# Patient Record
Sex: FEMALE | Race: WHITE | Employment: OTHER | URBAN - METROPOLITAN AREA
[De-identification: names, ages, dates, MRNs, and addresses within clinical notes are randomized per-mention and may not be internally consistent; named-entity substitution may affect disease eponyms.]

---

## 2017-01-05 ENCOUNTER — OFFICE VISIT (OUTPATIENT)
Dept: FAMILY MEDICINE | Facility: CLINIC | Age: 62
End: 2017-01-05
Payer: COMMERCIAL

## 2017-01-05 VITALS
SYSTOLIC BLOOD PRESSURE: 124 MMHG | BODY MASS INDEX: 25.21 KG/M2 | HEIGHT: 69 IN | RESPIRATION RATE: 16 BRPM | DIASTOLIC BLOOD PRESSURE: 72 MMHG | OXYGEN SATURATION: 99 % | HEART RATE: 92 BPM | TEMPERATURE: 98.1 F | WEIGHT: 170.2 LBS

## 2017-01-05 DIAGNOSIS — S51.801A WOUND, OPEN, ARM, FOREARM, RIGHT, INITIAL ENCOUNTER: Primary | ICD-10-CM

## 2017-01-05 PROCEDURE — 99213 OFFICE O/P EST LOW 20 MIN: CPT | Performed by: PHYSICIAN ASSISTANT

## 2017-01-05 RX ORDER — MUPIROCIN 20 MG/G
OINTMENT TOPICAL 3 TIMES DAILY
Qty: 22 G | Refills: 1 | Status: SHIPPED | OUTPATIENT
Start: 2017-01-05 | End: 2017-01-10

## 2017-01-05 NOTE — NURSING NOTE
"Chief Complaint   Patient presents with     Derm Problem     Wound on left arm x2 months.  Sore and itchy.     /72 mmHg  Pulse 92  Temp(Src) 98.1  F (36.7  C) (Oral)  Resp 16  Ht 5' 8.5\" (1.74 m)  Wt 170 lb 3.2 oz (77.202 kg)  BMI 25.50 kg/m2  SpO2 99%  LMP 08/15/2010  Breastfeeding? No Estimated body mass index is 25.5 kg/(m^2) as calculated from the following:    Height as of this encounter: 5' 8.5\" (1.74 m).    Weight as of this encounter: 170 lb 3.2 oz (77.202 kg).  bp completed using cuff size: regular      left    Health Maintenance Due Pending Provider Review:  NONE    n/a    Yaneth De Paz MA  [facility]    "

## 2017-01-05 NOTE — MR AVS SNAPSHOT
"              After Visit Summary   1/5/2017    Maggi Reynolds    MRN: [id number]           Patient Information     Date Of Birth          1955        Visit Information        Provider Department      1/5/2017 12:00 PM Paco Moser PA-C [facility]        Today's Diagnoses     Wound, open, arm, forearm, right, initial encounter    -  1        Follow-ups after your visit        Who to contact     If you have questions or need follow up information about today's clinic visit or your schedule please contact [facility] directly at [phone].  Normal or non-critical lab and imaging results will be communicated to you by [employer], letter or phone within 4 business days after the clinic has received the results. If you do not hear from us within 7 days, please contact the clinic through [employer] or phone. If you have a critical or abnormal lab result, we will notify you by phone as soon as possible.  Submit refill requests through [employer] or call your pharmacy and they will forward the refill request to us. Please allow 3 business days for your refill to be completed.          Additional Information About Your Visit        MyChart Information     [employer] gives you secure access to your electronic health record. If you see a primary care provider, you can also send messages to your care team and make appointments. If you have questions, please call your primary care clinic.  If you do not have a primary care provider, please call [phone] and they will assist you.        Care EveryWhere ID     This is your Care EveryWhere ID. This could be used by other organizations to access your [place] medical records  [id number]        Your Vitals Were     Pulse Temperature Respirations    92 98.1  F (36.7  C) (Oral) 16    Height BMI (Body Mass Index) Pulse Oximetry    5' 8.5\" (1.74 m) 25.50 kg/m2 99%    Last Period Breastfeeding?       08/15/2010 No        Blood Pressure from " Last 3 Encounters:   01/05/17 124/72   05/31/16 134/62   05/27/16 136/77    Weight from Last 3 Encounters:   01/05/17 170 lb 3.2 oz (77.202 kg)   05/31/16 176 lb (79.833 kg)   05/27/16 178 lb (80.74 kg)              Today, you had the following     No orders found for display         Today's Medication Changes          These changes are accurate as of: 1/5/17 12:03 PM.  If you have any questions, ask your nurse or doctor.               Start taking these medicines.        Dose/Directions    mupirocin 2 % ointment   Commonly known as:  BACTROBAN   Used for:  Wound, open, arm, forearm, right, initial encounter   Started by:  Paco Moser PA-C        Apply topically 3 times daily for 5 days   Quantity:  22 g   Refills:  1            Where to get your medicines      These medications were sent to [facility] [address]  [address] [address]     Phone:  [phone]    - mupirocin 2 % ointment             Primary Care Provider Office Phone # Fax #    Sara Thomas [phone]       [facility] [address] [address]        Thank you!     Thank you for choosing [facility]  for your care. Our goal is always to provide you with excellent care. Hearing back from our patients is one way we can continue to improve our services. Please take a few minutes to complete the written survey that you may receive in the mail after your visit with us. Thank you!             Your Updated Medication List - Protect others around you: Learn how to safely use, store and throw away your medicines at www.disposemymeds.org.          This list is accurate as of: 1/5/17 12:03 PM.  Always use your most recent med list.                   Brand Name Dispense Instructions for use    albuterol 108 (90 BASE) MCG/ACT Inhaler    PROAIR HFA/PROVENTIL HFA/VENTOLIN HFA    1 Inhaler    Inhale 2 puffs into the lungs every  6 hours as needed for shortness of breath / dyspnea or wheezing       aspirin 325 MG tablet     qs    1 tab in am       FLUoxetine 20 MG capsule    PROZAC    90 capsule    3 CAP PO QD (Once per day) in AM.       lisinopril 20 MG tablet    PRINIVIL/ZESTRIL    30 tablet    Take 1 tablet (20 mg) by mouth daily       mupirocin 2 % ointment    BACTROBAN    22 g    Apply topically 3 times daily for 5 days       REFRESH 1 % ophthalmic solution   Generic drug:  carboxymethylcellulose     1    as needed       RITALIN 10 MG tablet   Generic drug:  methylphenidate      Take 10 mg by mouth 2 times daily. 1.5 in AM, 1.5 NOON, 20mg in PM if needed.

## 2017-01-05 NOTE — PROGRESS NOTES
"  SUBJECTIVE:                                                    Maggi Reynolds is a 61 year old female who presents to clinic today for the following health issues:      Chief Complaint   Patient presents with     Derm Problem     Wound on left arm x2 months.  Sore and itchy.             Problem list and histories reviewed & adjusted, as indicated.  Additional history: she did have a pinching injury a few months back, and has had sore over the area since then.  She did treat with some antibiotic ointment and some band aid.  It is in a bad spot, where she flexes her arm a lot, and unable to really keep band aid on.      Problem list, Medication list, Allergies, and Medical/Social/Surgical histories reviewed in EPIC and updated as appropriate.    ROS:  Constitutional, HEENT, cardiovascular, pulmonary, gi and gu systems are negative, except as otherwise noted.    OBJECTIVE:                                                    /72 mmHg  Pulse 92  Temp(Src) 98.1  F (36.7  C) (Oral)  Resp 16  Ht 5' 8.5\" (1.74 m)  Wt 170 lb 3.2 oz (77.202 kg)  BMI 25.50 kg/m2  SpO2 99%  LMP 08/15/2010  Breastfeeding? No  Body mass index is 25.5 kg/(m^2).  GENERAL: alert and no distress  EYES: Eyes grossly normal to inspection  RESP: lungs clear to auscultation - no rales, rhonchi or wheezes  CV: regular rate and rhythm, normal S1 S2, no S3 or S4, no murmur, click or rub, no peripheral edema and peripheral pulses strong  SKIN: small eschar over right medial AC joint.    Diagnostic Test Results:  none      ASSESSMENT/PLAN:                                                            1. Wound, open, arm, forearm, right, initial encounter  Does not look actively infected, most likely just has not had time to heal due to location.  Will cover with smaller band aid along with antibiotic ointment.  If no improvement in 7-10 days, contact us, may nee referral to derm.  - mupirocin (BACTROBAN) 2 % ointment; Apply topically 3 " times daily for 5 days  Dispense: 22 g; Refill: 1        Paco Moser PA-C  [facility]

## 2017-04-07 DIAGNOSIS — I10 BENIGN ESSENTIAL HYPERTENSION: ICD-10-CM

## 2017-04-07 RX ORDER — LISINOPRIL 20 MG/1
20 TABLET ORAL DAILY
Qty: 30 TABLET | Refills: 5 | Status: SHIPPED | OUTPATIENT
Start: 2017-04-07 | End: 2017-10-21

## 2017-04-07 NOTE — TELEPHONE ENCOUNTER
Lisinopril 20mg      Last Written Prescription Date: 9/30/16  Last Fill Quantity: 30, # refills: 5  Last Office Visit with [facility], [facility] or [facility] prescribing provider: 1/5/17       Potassium   Date Value Ref Range Status   05/08/2015 4.7 3.4 - 5.3 mmol/L Final     Creatinine   Date Value Ref Range Status   05/08/2015 1.05 (H) 0.52 - 1.04 mg/dL Final     BP Readings from Last 3 Encounters:   01/05/17 124/72   05/31/16 134/62   05/27/16 136/77     Routing refill request to provider for review/approval because:  Labs out of range:  Elevated SCr at last check  Labs not current:  Overdue for annual labs    Gisele Torres, PharmD  [facility]  [phone]

## 2017-04-12 DIAGNOSIS — F33.0 MILD RECURRENT MAJOR DEPRESSION (H): ICD-10-CM

## 2017-04-12 NOTE — TELEPHONE ENCOUNTER
Fluoxetine 20mg     Last Written Prescription Date: 12/3/16  Last Fill Quantity: 90, # refills: 0  Last Office Visit with [facility] primary care provider:  1/5/17        Last PHQ-9 score on record=   PHQ-9 SCORE 3/31/2016   Total Score -   Total Score 5     Routing refill request to provider for review/approval because:  Labs not current:  Due for PHQ9    Gisele Torres PharmD  [facility]

## 2017-05-26 ENCOUNTER — OFFICE VISIT (OUTPATIENT)
Dept: FAMILY MEDICINE | Facility: CLINIC | Age: 62
End: 2017-05-26
Payer: COMMERCIAL

## 2017-05-26 VITALS
HEIGHT: 69 IN | HEART RATE: 78 BPM | WEIGHT: 175.2 LBS | SYSTOLIC BLOOD PRESSURE: 116 MMHG | DIASTOLIC BLOOD PRESSURE: 68 MMHG | OXYGEN SATURATION: 98 % | BODY MASS INDEX: 25.95 KG/M2 | TEMPERATURE: 98.1 F | RESPIRATION RATE: 15 BRPM

## 2017-05-26 DIAGNOSIS — F33.0 MILD RECURRENT MAJOR DEPRESSION (H): ICD-10-CM

## 2017-05-26 DIAGNOSIS — J40 BRONCHITIS: ICD-10-CM

## 2017-05-26 DIAGNOSIS — R07.0 THROAT PAIN: Primary | ICD-10-CM

## 2017-05-26 LAB
DEPRECATED S PYO AG THROAT QL EIA: NORMAL
MICRO REPORT STATUS: NORMAL
SPECIMEN SOURCE: NORMAL

## 2017-05-26 PROCEDURE — 87081 CULTURE SCREEN ONLY: CPT | Performed by: FAMILY MEDICINE

## 2017-05-26 PROCEDURE — 99214 OFFICE O/P EST MOD 30 MIN: CPT | Performed by: FAMILY MEDICINE

## 2017-05-26 PROCEDURE — 87880 STREP A ASSAY W/OPTIC: CPT | Performed by: FAMILY MEDICINE

## 2017-05-26 RX ORDER — ALBUTEROL SULFATE 90 UG/1
2 AEROSOL, METERED RESPIRATORY (INHALATION) EVERY 6 HOURS PRN
Qty: 1 INHALER | Refills: 3 | Status: SHIPPED | OUTPATIENT
Start: 2017-05-26 | End: 2017-10-31

## 2017-05-26 RX ORDER — AZITHROMYCIN 250 MG/1
TABLET, FILM COATED ORAL
Qty: 6 TABLET | Refills: 0 | Status: SHIPPED | OUTPATIENT
Start: 2017-05-26 | End: 2017-06-09

## 2017-05-26 NOTE — TELEPHONE ENCOUNTER
Fluoxetine 20 mg capsules  Last Written Prescription Date: 4/12/17  Last Fill Quantity: 90, # refills: 0  Last Office Visit with [facility] primary care provider:  5/27/16       Last PHQ-9 score on record=   PHQ-9 SCORE 3/31/2016   Total Score -   Total Score 5

## 2017-05-26 NOTE — MR AVS SNAPSHOT
"              After Visit Summary   5/26/2017    Maggi Reynolds    MRN: [id number]           Patient Information     Date Of Birth          1955        Visit Information        Provider Department      5/26/2017 12:45 PM Sara Thomas MD [facility]        Today's Diagnoses     Throat pain    -  1    Bronchitis           Follow-ups after your visit        Who to contact     If you have questions or need follow up information about today's clinic visit or your schedule please contact [facility] directly at [phone].  Normal or non-critical lab and imaging results will be communicated to you by [employer], letter or phone within 4 business days after the clinic has received the results. If you do not hear from us within 7 days, please contact the clinic through [employer] or phone. If you have a critical or abnormal lab result, we will notify you by phone as soon as possible.  Submit refill requests through [employer] or call your pharmacy and they will forward the refill request to us. Please allow 3 business days for your refill to be completed.          Additional Information About Your Visit        MyChart Information     [employer] gives you secure access to your electronic health record. If you see a primary care provider, you can also send messages to your care team and make appointments. If you have questions, please call your primary care clinic.  If you do not have a primary care provider, please call [phone] and they will assist you.        Care EveryWhere ID     This is your Care EveryWhere ID. This could be used by other organizations to access your [place] medical records  [id number]        Your Vitals Were     Pulse Temperature Respirations Height Last Period Pulse Oximetry    78 98.1  F (36.7  C) (Oral) 15 5' 8.5\" (1.74 m) 08/15/2010 98%    Breastfeeding? BMI (Body Mass Index)                No 26.25 kg/m2           Blood Pressure from Last 3 Encounters:   05/26/17 " 116/68   01/05/17 124/72   05/31/16 134/62    Weight from Last 3 Encounters:   05/26/17 175 lb 3.2 oz (79.5 kg)   01/05/17 170 lb 3.2 oz (77.2 kg)   05/31/16 176 lb (79.8 kg)              We Performed the Following     Beta strep group A culture     Strep, Rapid Screen          Today's Medication Changes          These changes are accurate as of: 5/26/17  3:22 PM.  If you have any questions, ask your nurse or doctor.               Start taking these medicines.        Dose/Directions    azithromycin 250 MG tablet   Commonly known as:  ZITHROMAX   Used for:  Bronchitis   Started by:  Sara Thomas MD        Two tablets first day, then one tablet daily for four days.   Quantity:  6 tablet   Refills:  0            Where to get your medicines      These medications were sent to [facility] [address]  [address] [place] [address], [facility] [address]     Phone:  [phone]     albuterol 108 (90 BASE) MCG/ACT Inhaler    azithromycin 250 MG tablet                Primary Care Provider Office Phone # Fax #    Sara Thomas [phone]       [facility] [address] [facility] [address] [place] [id number]        Thank you!     Thank you for choosing [facility]  for your care. Our goal is always to provide you with excellent care. Hearing back from our patients is one way we can continue to improve our services. Please take a few minutes to complete the written survey that you may receive in the mail after your visit with us. Thank you!             Your Updated Medication List - Protect others around you: Learn how to safely use, store and throw away your medicines at www.disposemymeds.org.          This list is accurate as of: 5/26/17  3:22 PM.  Always use your most recent med list.                   Brand Name Dispense Instructions for use    albuterol 108 (90 BASE) MCG/ACT Inhaler    PROAIR HFA/PROVENTIL HFA/VENTOLIN HFA    1 Inhaler    Inhale 2 puffs  into the lungs every 6 hours as needed for shortness of breath / dyspnea or wheezing       aspirin 325 MG tablet     qs    1 tab in am       azithromycin 250 MG tablet    ZITHROMAX    6 tablet    Two tablets first day, then one tablet daily for four days.       FLUoxetine 20 MG capsule    PROZAC    90 capsule    3 CAP PO QD (Once per day) in AM.       lisinopril 20 MG tablet    PRINIVIL/ZESTRIL    30 tablet    Take 1 tablet (20 mg) by mouth daily       REFRESH 1 % ophthalmic solution   Generic drug:  carboxymethylcellulose     1    as needed       RITALIN 10 MG tablet   Generic drug:  methylphenidate      Take 10 mg by mouth 2 times daily. 1.5 in AM, 1.5 NOON, 20mg in PM if needed.

## 2017-05-26 NOTE — PROGRESS NOTES
SUBJECTIVE:                                                    Maggi Reynolds is a 62 year old female who presents to clinic today for the following health issues:      RESPIRATORY SYMPTOMS      Duration: 3-4 days    Description  sore throat, facial pain/pressure and cough    Severity: moderate - severe (hasn't been able to go to work)    Accompanying signs and symptoms: None    History (predisposing factors):  none    Precipitating or alleviating factors: None    Therapies tried and outcome:  NyQuil and Aleve- only getting some relief.           Problem list and histories reviewed & adjusted, as indicated.  Additional history: as documented    Patient Active Problem List   Diagnosis     Disorder of bone and cartilage     Disease of lung     Mild recurrent major depression (H)     CARDIOVASCULAR SCREENING; LDL GOAL LESS THAN 160     Hypertension goal BP (blood pressure) < 140/90     Advanced directives, counseling/discussion     Discharge from left nipple     Cervical lymphadenopathy     Past Surgical History:   Procedure Laterality Date     DILATION AND CURETTAGE  8/21/2013    Procedure: DILATION AND CURETTAGE;;  Surgeon: Tamar Walsh MD;  Location: [facility]     LAPAROSCOPIC SALPINGO-OOPHORECTOMY  8/21/2013    Procedure: LAPAROSCOPIC SALPINGO-OOPHORECTOMY;  Laparoscopic Bilateral Salpingo-Oophorectomy, Pelvic washings, Dilation and Curettage;  Surgeon: Tamar Walsh MD;  Location:  OR     LUMPECTOMY BREAST Bilateral 5/31/2016    Procedure: LUMPECTOMY BREAST;  Surgeon: Barney Givens MD;  Location:  OR       Social History   Substance Use Topics     Smoking status: Never Smoker     Smokeless tobacco: Never Used     Alcohol use 0.0 oz/week     0 Standard drinks or equivalent per week      Comment: wine sometimes     Family History   Problem Relation Age of Onset     C.A.D. Father      bypass at age 78 and Pace maker at age 90     DIABETES Father      type 2     Breast Cancer  Maternal Grandmother      at age 40's     Arthritis Mother      OSTEOPOROSIS Mother      C.A.D. Paternal Grandmother      congestive heart failure     HEART DISEASE Father      HEART DISEASE Mother      arhythmia     CEREBROVASCULAR DISEASE Maternal Grandfather      at age 70's     Allergies Mother      seasonal     Alzheimer Disease Mother      at age 81     GASTROINTESTINAL DISEASE Father      ulcers     OSTEOPOROSIS Mother      at age 75     Respiratory Brother      bronchiestis         Current Outpatient Prescriptions   Medication Sig Dispense Refill     albuterol (PROAIR HFA/PROVENTIL HFA/VENTOLIN HFA) 108 (90 BASE) MCG/ACT Inhaler Inhale 2 puffs into the lungs every 6 hours as needed for shortness of breath / dyspnea or wheezing 1 Inhaler 3     azithromycin (ZITHROMAX) 250 MG tablet Two tablets first day, then one tablet daily for four days. 6 tablet 0     FLUoxetine (PROZAC) 20 MG capsule 3 CAP PO QD (Once per day) in AM. 90 capsule 0     lisinopril (PRINIVIL/ZESTRIL) 20 MG tablet Take 1 tablet (20 mg) by mouth daily 30 tablet 5     methylphenidate (RITALIN) 10 MG tablet Take 10 mg by mouth 2 times daily. 1.5 in AM, 1.5 NOON, 20mg in PM if needed.       ASPIRIN 325 MG OR TABS 1 tab in am qs prn     REFRESH 1 % OP SOLN as needed 1 0     [DISCONTINUED] albuterol (PROAIR HFA, PROVENTIL HFA, VENTOLIN HFA) 108 (90 BASE) MCG/ACT inhaler Inhale 2 puffs into the lungs every 6 hours as needed for shortness of breath / dyspnea or wheezing 1 Inhaler 3     Allergies   Allergen Reactions     No Known Allergies      Recent Labs   Lab Test  03/31/16   1152  05/08/15   1141  03/22/14   0206   11/01/10   1111  10/01/10   0759   07/01/09   1159   LDL  128*   --    --    --    --   68   --   83   HDL  135   --    --    --    --   108   --   99   TRIG  59   --    --    --    --   60   --   51   ALT   --   30   --    --    --    --    --    --    CR   --   1.05*  0.90   < >   --   1.01   < >  0.93   GFRESTIMATED   --   53*  64    "< >   --   57*   < >  63   GFRESTBLACK   --   64  78   < >   --   69   < >  76   POTASSIUM   --   4.7  4.5   < >   --   5.0   < >  4.0   TSH  1.79   --    --    --   2.35   --    < >   --     < > = values in this interval not displayed.      BP Readings from Last 3 Encounters:   05/26/17 116/68   01/05/17 124/72   05/31/16 134/62    Wt Readings from Last 3 Encounters:   05/26/17 175 lb 3.2 oz (79.5 kg)   01/05/17 170 lb 3.2 oz (77.2 kg)   05/31/16 176 lb (79.8 kg)                  Labs reviewed in EPIC    Reviewed and updated as needed this visit by clinical staff       Reviewed and updated as needed this visit by Provider         ROS:  Constitutional, HEENT, cardiovascular, pulmonary, GI, , musculoskeletal, neuro, skin, endocrine and psych systems are negative, except as otherwise noted.    OBJECTIVE:                                                    /68  Pulse 78  Temp 98.1  F (36.7  C) (Oral)  Resp 15  Ht 5' 8.5\" (1.74 m)  Wt 175 lb 3.2 oz (79.5 kg)  LMP 08/15/2010  SpO2 98%  Breastfeeding? No  BMI 26.25 kg/m2  Body mass index is 26.25 kg/(m^2).  GENERAL: healthy, alert and no distress  EYES: Eyes grossly normal to inspection, PERRL and conjunctivae and sclerae normal  HENT: ear canals and TM's normal, nose and mouth without ulcers or lesions  NECK: no adenopathy, no asymmetry, masses, or scars and thyroid normal to palpation  RESP: lungs clear to auscultation - no rales, rhonchi or wheezes  CV: regular rate and rhythm, normal S1 S2, no S3 or S4, no murmur, click or rub, no peripheral edema and peripheral pulses strong  ABDOMEN: soft, nontender, no hepatosplenomegaly, no masses and bowel sounds normal  MS: no gross musculoskeletal defects noted, no edema    Diagnostic Test Results:  none      ASSESSMENT/PLAN:                                                      1. Bronchitis  We discussed round of Abx and also Albuterol to see if that helps with her cough , she does not have asthma but has had " bronchitis in the past and this has helped .  - albuterol (PROAIR HFA/PROVENTIL HFA/VENTOLIN HFA) 108 (90 BASE) MCG/ACT Inhaler; Inhale 2 puffs into the lungs every 6 hours as needed for shortness of breath / dyspnea or wheezing  Dispense: 1 Inhaler; Refill: 3  - azithromycin (ZITHROMAX) 250 MG tablet; Two tablets first day, then one tablet daily for four days.  Dispense: 6 tablet; Refill: 0    2. Throat pain  Negative , discussed with the pt , treatment plan as above   - Strep, Rapid Screen  - Beta strep group A culture    RTC if no improving or worsening.  Pt is aware  and comfortable with the current plan.      Sara Thomas MD  [facility]

## 2017-05-26 NOTE — TELEPHONE ENCOUNTER
ANUEL  Pt is due for apt for this but I am not able to sign local print (won't e-prescribe).  I added in comments due for apt.  Please authorize if appropriate.  Thanks,  Valencia Lindsay RN

## 2017-05-27 LAB
BACTERIA SPEC CULT: NORMAL
MICRO REPORT STATUS: NORMAL
SPECIMEN SOURCE: NORMAL

## 2017-05-30 ENCOUNTER — TELEPHONE (OUTPATIENT)
Dept: FAMILY MEDICINE | Facility: CLINIC | Age: 62
End: 2017-05-30

## 2017-05-30 DIAGNOSIS — J40 BRONCHITIS: Primary | ICD-10-CM

## 2017-05-30 RX ORDER — CODEINE PHOSPHATE AND GUAIFENESIN 10; 100 MG/5ML; MG/5ML
1-2 SOLUTION ORAL AT BEDTIME
Qty: 420 ML | Refills: 0 | Status: SHIPPED | OUTPATIENT
Start: 2017-05-30 | End: 2017-06-09

## 2017-05-30 NOTE — TELEPHONE ENCOUNTER
LS  Pt requesting cough medicine for sleep   Cough worsens at night, once it starts has difficulty stopping  Finished last dose of abx this morning, last temp 99F  Has used Robitussin AC in the past with good results, pended, Please approve if appropriate  Lorena Perdue RN

## 2017-05-30 NOTE — TELEPHONE ENCOUNTER
Reason for call:  Patient reporting a symptom    Symptom or request: still coughing during the night.  Pt was just seen on Friday and was given a Z-jyotsna and it  Has not helped . Was told to call and get more medication of  Something different.    Duration (how long have symptoms been present): on going    Have you been treated for this before? Yes    Additional comments:       Phone Number patient can be reached at:      Home Phone [phone]   Mobile [phone]     Call the cell phone    Best Time:  any    Can we leave a detailed message on this number:  YES    Call taken on 5/30/2017 at 9:05 AM by Capri Hull

## 2017-06-05 NOTE — TELEPHONE ENCOUNTER
Patient called back, reports cough syrup makes her feel kind of lousy, is taking it at night is not feeling any, sore throat is coming back, was told by Dr Thomas to call in if not feeling better, patient does not feel any better wondering if she needs another round of antibiotics [facility] call her Cell [phone] ok leave message

## 2017-06-05 NOTE — TELEPHONE ENCOUNTER
LS,  Please see below message and advise on next steps  Spoke with pt to inform her you are back in office tomorrow  Jacinta CONTRERAS RN

## 2017-06-06 RX ORDER — AZITHROMYCIN 250 MG/1
TABLET, FILM COATED ORAL
Qty: 6 TABLET | Refills: 0 | Status: SHIPPED | OUTPATIENT
Start: 2017-06-06 | End: 2017-06-09

## 2017-06-06 NOTE — TELEPHONE ENCOUNTER
i have sent another Rx for an Abx but if she is not better by Fri , I would need to see her , can you let her know ?  Thanks

## 2017-06-09 ENCOUNTER — OFFICE VISIT (OUTPATIENT)
Dept: FAMILY MEDICINE | Facility: CLINIC | Age: 62
End: 2017-06-09
Payer: COMMERCIAL

## 2017-06-09 VITALS
HEART RATE: 103 BPM | TEMPERATURE: 97.7 F | DIASTOLIC BLOOD PRESSURE: 66 MMHG | HEIGHT: 69 IN | OXYGEN SATURATION: 97 % | BODY MASS INDEX: 25.25 KG/M2 | SYSTOLIC BLOOD PRESSURE: 110 MMHG | WEIGHT: 170.5 LBS

## 2017-06-09 DIAGNOSIS — R05.9 COUGH: Primary | ICD-10-CM

## 2017-06-09 DIAGNOSIS — F33.0 MILD RECURRENT MAJOR DEPRESSION (H): ICD-10-CM

## 2017-06-09 DIAGNOSIS — J98.01 BRONCHOSPASM: ICD-10-CM

## 2017-06-09 PROCEDURE — 99213 OFFICE O/P EST LOW 20 MIN: CPT | Performed by: PHYSICIAN ASSISTANT

## 2017-06-09 RX ORDER — BENZONATATE 200 MG/1
200 CAPSULE ORAL 3 TIMES DAILY PRN
Qty: 21 CAPSULE | Refills: 0 | Status: SHIPPED | OUTPATIENT
Start: 2017-06-09 | End: 2017-10-31

## 2017-06-09 RX ORDER — PREDNISONE 20 MG/1
20 TABLET ORAL DAILY
Qty: 5 TABLET | Refills: 0 | Status: SHIPPED | OUTPATIENT
Start: 2017-06-09 | End: 2017-10-31

## 2017-06-09 ASSESSMENT — ANXIETY QUESTIONNAIRES
2. NOT BEING ABLE TO STOP OR CONTROL WORRYING: NOT AT ALL
6. BECOMING EASILY ANNOYED OR IRRITABLE: SEVERAL DAYS
3. WORRYING TOO MUCH ABOUT DIFFERENT THINGS: NOT AT ALL
GAD7 TOTAL SCORE: 1
7. FEELING AFRAID AS IF SOMETHING AWFUL MIGHT HAPPEN: NOT AT ALL
1. FEELING NERVOUS, ANXIOUS, OR ON EDGE: NOT AT ALL
5. BEING SO RESTLESS THAT IT IS HARD TO SIT STILL: NOT AT ALL
IF YOU CHECKED OFF ANY PROBLEMS ON THIS QUESTIONNAIRE, HOW DIFFICULT HAVE THESE PROBLEMS MADE IT FOR YOU TO DO YOUR WORK, TAKE CARE OF THINGS AT HOME, OR GET ALONG WITH OTHER PEOPLE: NOT DIFFICULT AT ALL

## 2017-06-09 ASSESSMENT — PATIENT HEALTH QUESTIONNAIRE - PHQ9: 5. POOR APPETITE OR OVEREATING: NOT AT ALL

## 2017-06-09 NOTE — PATIENT INSTRUCTIONS
Please call and schedule your breast cancer screening mammogram.  This is the most common form of cancer in women and the risk increases significantly after age 50 to 74. We recommend  BIYEARLY mammograms as well as a yearly breast exam by a physician. You do not need an order for this to be scheduled.    [facility] [phone]  [facility] [phone]    If you have had a mammogram outside of [place] within one to two year please contact us so that we may request those records.    Take good care,    [employer]

## 2017-06-09 NOTE — Clinical Note
Please abstract the following data from this visit with this patient into the appropriate field in Epic:  Colonoscopy done on this date: July 2016 (approximately), by this group: [facility], results were normal.

## 2017-06-09 NOTE — MR AVS SNAPSHOT
After Visit Summary   6/9/2017    Maggi Reynolds    MRN: [id number]           Patient Information     Date Of Birth          1955        Visit Information        Provider Department      6/9/2017 3:40 PM Paco Moser PA-C [facility]        Today's Diagnoses     Cough    -  1    Bronchospasm          Care Instructions    Please call and schedule your breast cancer screening mammogram.  This is the most common form of cancer in women and the risk increases significantly after age 50 to 74. We recommend  BIYEARLY mammograms as well as a yearly breast exam by a physician. You do not need an order for this to be scheduled.    [facility] [phone]  [facility] [phone]    If you have had a mammogram outside of [place] within one to two year please contact us so that we may request those records.    Take good care,    [facility] TEAM            Follow-ups after your visit        Who to contact     If you have questions or need follow up information about today's clinic visit or your schedule please contact [facility] directly at [phone].  Normal or non-critical lab and imaging results will be communicated to you by MyChart, letter or phone within 4 business days after the clinic has received the results. If you do not hear from us within 7 days, please contact the clinic through [employer] or phone. If you have a critical or abnormal lab result, we will notify you by phone as soon as possible.  Submit refill requests through [employer] or call your pharmacy and they will forward the refill request to us. Please allow 3 business days for your refill to be completed.          Additional Information About Your Visit        MyChart Information     [employer] gives you secure access to your electronic health record. If you see a primary care provider, you can also send messages to your care team and make appointments. If you have questions,  "please call your primary care clinic.  If you do not have a primary care provider, please call [phone] and they will assist you.        Care EveryWhere ID     This is your Care EveryWhere ID. This could be used by other organizations to access your [facility] medical records  [id number]        Your Vitals Were     Pulse Temperature Height Last Period Pulse Oximetry Breastfeeding?    103 97.7  F (36.5  C) (Tympanic) 5' 8.5\" (1.74 m) 08/15/2010 97% No    BMI (Body Mass Index)                   25.55 kg/m2            Blood Pressure from Last 3 Encounters:   06/09/17 110/66   05/26/17 116/68   01/05/17 124/72    Weight from Last 3 Encounters:   06/09/17 170 lb 8 oz (77.3 kg)   05/26/17 175 lb 3.2 oz (79.5 kg)   01/05/17 170 lb 3.2 oz (77.2 kg)              Today, you had the following     No orders found for display         Today's Medication Changes          These changes are accurate as of: 6/9/17  3:49 PM.  If you have any questions, ask your nurse or doctor.               Start taking these medicines.        Dose/Directions    benzonatate 200 MG capsule   Commonly known as:  TESSALON   Used for:  Cough   Started by:  Paco Moser PA-C        Dose:  200 mg   Take 1 capsule (200 mg) by mouth 3 times daily as needed for cough   Quantity:  21 capsule   Refills:  0       predniSONE 20 MG tablet   Commonly known as:  DELTASONE   Used for:  Bronchospasm   Started by:  Paco Moser PA-C        Dose:  20 mg   Take 1 tablet (20 mg) by mouth daily   Quantity:  5 tablet   Refills:  0            Where to get your medicines      These medications were sent to [facility] [place], [place] - [address]  [address] [address]     Phone:  [phone]     benzonatate 200 MG capsule    predniSONE 20 MG tablet                Primary Care Provider Office Phone # Fax #    Sara Thomas [phone]       [address] "   [facility] [address]        Thank you!     Thank you for choosing [facility]  for your care. Our goal is always to provide you with excellent care. Hearing back from our patients is one way we can continue to improve our services. Please take a few minutes to complete the written survey that you may receive in the mail after your visit with us. Thank you!             Your Updated Medication List - Protect others around you: Learn how to safely use, store and throw away your medicines at www.disposemymeds.org.          This list is accurate as of: 6/9/17  3:49 PM.  Always use your most recent med list.                   Brand Name Dispense Instructions for use    albuterol 108 (90 BASE) MCG/ACT Inhaler    PROAIR HFA/PROVENTIL HFA/VENTOLIN HFA    1 Inhaler    Inhale 2 puffs into the lungs every 6 hours as needed for shortness of breath / dyspnea or wheezing       aspirin 325 MG tablet     qs    1 tab in am       benzonatate 200 MG capsule    TESSALON    21 capsule    Take 1 capsule (200 mg) by mouth 3 times daily as needed for cough       FLUoxetine 20 MG capsule    PROZAC    90 capsule    3 CAP PO QD (Once per day) in AM.       lisinopril 20 MG tablet    PRINIVIL/ZESTRIL    30 tablet    Take 1 tablet (20 mg) by mouth daily       predniSONE 20 MG tablet    DELTASONE    5 tablet    Take 1 tablet (20 mg) by mouth daily       REFRESH 1 % ophthalmic solution   Generic drug:  carboxymethylcellulose     1    as needed       RITALIN 10 MG tablet   Generic drug:  methylphenidate      Take 10 mg by mouth 2 times daily. 1.5 in AM, 1.5 NOON, 20mg in PM if needed.

## 2017-06-10 ASSESSMENT — ANXIETY QUESTIONNAIRES: GAD7 TOTAL SCORE: 1

## 2017-06-10 ASSESSMENT — PATIENT HEALTH QUESTIONNAIRE - PHQ9: SUM OF ALL RESPONSES TO PHQ QUESTIONS 1-9: 2

## 2017-06-30 ENCOUNTER — HEALTH MAINTENANCE LETTER (OUTPATIENT)
Age: 62
End: 2017-06-30

## 2017-07-10 DIAGNOSIS — F33.0 MILD RECURRENT MAJOR DEPRESSION (H): ICD-10-CM

## 2017-07-10 NOTE — TELEPHONE ENCOUNTER
Prozac 20mg     Last Written Prescription Date: 5/26/17  Last Fill Quantity: 90, # refills: 0  Last Office Visit with [facility] primary care provider:  5/26/17        Last PHQ-9 score on record=   PHQ-9 SCORE 6/9/2017   Total Score -   Total Score 2         Thank you!  Bhumika Mckinney  [facility]

## 2017-07-10 NOTE — TELEPHONE ENCOUNTER
Prescription approved per [facility].      Thanks,    Siomara Rossi, Pharm. D.  [facility]  [facility]  [phone]

## 2017-10-21 DIAGNOSIS — I10 BENIGN ESSENTIAL HYPERTENSION: ICD-10-CM

## 2017-10-23 NOTE — TELEPHONE ENCOUNTER
Left non detailed VM for pt asking that they callback and schedule (physical with labs)  Jacinta CONTRERAS RN

## 2017-10-27 RX ORDER — LISINOPRIL 20 MG/1
20 TABLET ORAL DAILY
Qty: 30 TABLET | Refills: 0 | Status: SHIPPED | OUTPATIENT
Start: 2017-10-27 | End: 2019-09-04

## 2017-10-27 NOTE — TELEPHONE ENCOUNTER
Medication is being filled for 1 time refill only due to:  upcoming appt    Next 5 appointments (look out 90 days)     Oct 31, 2017 10:00 AM CDT   Office Visit with Sara Thomas MD   [facility] ([facility])    [address]  [facility] [address]   [phone]                Jacinta CONTRERAS RN

## 2017-10-31 ENCOUNTER — OFFICE VISIT (OUTPATIENT)
Dept: FAMILY MEDICINE | Facility: CLINIC | Age: 62
End: 2017-10-31
Payer: COMMERCIAL

## 2017-10-31 VITALS
TEMPERATURE: 96.8 F | WEIGHT: 172 LBS | HEART RATE: 74 BPM | HEIGHT: 69 IN | OXYGEN SATURATION: 98 % | BODY MASS INDEX: 25.48 KG/M2 | RESPIRATION RATE: 16 BRPM | SYSTOLIC BLOOD PRESSURE: 108 MMHG | DIASTOLIC BLOOD PRESSURE: 65 MMHG

## 2017-10-31 DIAGNOSIS — J40 BRONCHITIS: ICD-10-CM

## 2017-10-31 DIAGNOSIS — Z00.00 ENCOUNTER FOR ROUTINE ADULT HEALTH EXAMINATION WITHOUT ABNORMAL FINDINGS: Primary | ICD-10-CM

## 2017-10-31 DIAGNOSIS — Z83.3 FAMILY HISTORY OF DIABETES MELLITUS: ICD-10-CM

## 2017-10-31 DIAGNOSIS — F33.0 MILD RECURRENT MAJOR DEPRESSION (H): ICD-10-CM

## 2017-10-31 DIAGNOSIS — I10 BENIGN ESSENTIAL HYPERTENSION: ICD-10-CM

## 2017-10-31 DIAGNOSIS — R21 RASH: ICD-10-CM

## 2017-10-31 LAB
ALBUMIN SERPL-MCNC: 3.6 G/DL (ref 3.4–5)
ALP SERPL-CCNC: 118 U/L (ref 40–150)
ALT SERPL W P-5'-P-CCNC: 47 U/L (ref 0–50)
ANION GAP SERPL CALCULATED.3IONS-SCNC: 9 MMOL/L (ref 3–14)
AST SERPL W P-5'-P-CCNC: 26 U/L (ref 0–45)
BILIRUB SERPL-MCNC: 0.5 MG/DL (ref 0.2–1.3)
BUN SERPL-MCNC: 23 MG/DL (ref 7–30)
CALCIUM SERPL-MCNC: 9.3 MG/DL (ref 8.5–10.1)
CHLORIDE SERPL-SCNC: 106 MMOL/L (ref 94–109)
CHOLEST SERPL-MCNC: 207 MG/DL
CO2 SERPL-SCNC: 25 MMOL/L (ref 20–32)
CREAT SERPL-MCNC: 1.25 MG/DL (ref 0.52–1.04)
GFR SERPL CREATININE-BSD FRML MDRD: 43 ML/MIN/1.7M2
GLUCOSE SERPL-MCNC: 91 MG/DL (ref 70–99)
HBA1C MFR BLD: 5.2 % (ref 4.3–6)
HDLC SERPL-MCNC: 111 MG/DL
LDLC SERPL CALC-MCNC: 85 MG/DL
NONHDLC SERPL-MCNC: 96 MG/DL
POTASSIUM SERPL-SCNC: 4.6 MMOL/L (ref 3.4–5.3)
PROT SERPL-MCNC: 7.5 G/DL (ref 6.8–8.8)
SODIUM SERPL-SCNC: 140 MMOL/L (ref 133–144)
TRIGL SERPL-MCNC: 56 MG/DL

## 2017-10-31 PROCEDURE — 99213 OFFICE O/P EST LOW 20 MIN: CPT | Mod: 25 | Performed by: FAMILY MEDICINE

## 2017-10-31 PROCEDURE — 36415 COLL VENOUS BLD VENIPUNCTURE: CPT | Performed by: FAMILY MEDICINE

## 2017-10-31 PROCEDURE — 80061 LIPID PANEL: CPT | Performed by: FAMILY MEDICINE

## 2017-10-31 PROCEDURE — 83036 HEMOGLOBIN GLYCOSYLATED A1C: CPT | Performed by: FAMILY MEDICINE

## 2017-10-31 PROCEDURE — 90471 IMMUNIZATION ADMIN: CPT | Performed by: FAMILY MEDICINE

## 2017-10-31 PROCEDURE — 99396 PREV VISIT EST AGE 40-64: CPT | Mod: 25 | Performed by: FAMILY MEDICINE

## 2017-10-31 PROCEDURE — 90715 TDAP VACCINE 7 YRS/> IM: CPT | Performed by: FAMILY MEDICINE

## 2017-10-31 PROCEDURE — 80053 COMPREHEN METABOLIC PANEL: CPT | Performed by: FAMILY MEDICINE

## 2017-10-31 RX ORDER — LISINOPRIL 10 MG/1
10 TABLET ORAL DAILY
Qty: 90 TABLET | Refills: 1 | Status: SHIPPED | OUTPATIENT
Start: 2017-10-31 | End: 2018-06-04

## 2017-10-31 RX ORDER — LISINOPRIL 20 MG/1
20 TABLET ORAL DAILY
Qty: 30 TABLET | Refills: 0 | Status: CANCELLED | OUTPATIENT
Start: 2017-10-31

## 2017-10-31 RX ORDER — ALBUTEROL SULFATE 90 UG/1
2 AEROSOL, METERED RESPIRATORY (INHALATION) EVERY 6 HOURS PRN
Qty: 1 INHALER | Refills: 3 | Status: SHIPPED | OUTPATIENT
Start: 2017-10-31 | End: 2019-02-08

## 2017-10-31 NOTE — PATIENT INSTRUCTIONS

## 2017-10-31 NOTE — MR AVS SNAPSHOT
After Visit Summary   10/31/2017    Maggi Reynolds    MRN: [id number]           Patient Information     Date Of Birth          1955        Visit Information        Provider Department      10/31/2017 10:00 AM Sara Thomas MD [facility]        Today's Diagnoses     Encounter for routine adult health examination without abnormal findings    -  1    Bronchitis        Benign essential hypertension        Mild recurrent major depression (H)        Family history of diabetes mellitus        Rash          Care Instructions      Preventive Health Recommendations  Female Ages 50 - 64    Yearly exam: See your health care provider every year in order to  o Review health changes.   o Discuss preventive care.    o Review your medicines if your doctor has prescribed any.      Get a Pap test every three years (unless you have an abnormal result and your provider advises testing more often).    If you get Pap tests with HPV test, you only need to test every 5 years, unless you have an abnormal result.     You do not need a Pap test if your uterus was removed (hysterectomy) and you have not had cancer.    You should be tested each year for STDs (sexually transmitted diseases) if you're at risk.     Have a mammogram every 1 to 2 years.    Have a colonoscopy at age 50, or have a yearly FIT test (stool test). These exams screen for colon cancer.      Have a cholesterol test every 5 years, or more often if advised.    Have a diabetes test (fasting glucose) every three years. If you are at risk for diabetes, you should have this test more often.     If you are at risk for osteoporosis (brittle bone disease), think about having a bone density scan (DEXA).    Shots: Get a flu shot each year. Get a tetanus shot every 10 years.    Nutrition:     Eat at least 5 servings of fruits and vegetables each day.    Eat whole-grain bread, whole-wheat pasta and brown rice instead of white grains and  rice.    Talk to your provider about Calcium and Vitamin D.     Lifestyle    Exercise at least 150 minutes a week (30 minutes a day, 5 days a week). This will help you control your weight and prevent disease.    Limit alcohol to one drink per day.    No smoking.     Wear sunscreen to prevent skin cancer.     See your dentist every six months for an exam and cleaning.    See your eye doctor every 1 to 2 years.  PLEASE CALL TO SCHEDULE YOUR MAMMOGRAM  [facility] [phone]  [facility] [phone]                    Follow-ups after your visit        Additional Services     DERMATOLOGY REFERRAL       Your provider has referred you to: [facility]: [facility] [phone]  [email or web address]    Please be aware that coverage of these services is subject to the terms and limitations of your health insurance plan.  Call member services at your health plan with any benefit or coverage questions.      Please bring the following with you to your appointment:    (1) Any X-Rays, CTs or MRIs which have been performed.  Contact the facility where they were done to arrange for  prior to your scheduled appointment.    (2) List of current medications  (3) This referral request   (4) Any documents/labs given to you for this referral                  Who to contact     If you have questions or need follow up information about today's clinic visit or your schedule please contact [facility] directly at [phone].  Normal or non-critical lab and imaging results will be communicated to you by MyChart, letter or phone within 4 business days after the clinic has received the results. If you do not hear from us within 7 days, please contact the clinic through MyChart or phone. If you have a critical or abnormal lab result, we will notify you by phone as soon as possible.  Submit refill requests through [employer] or call your pharmacy and  "they will forward the refill request to us. Please allow 3 business days for your refill to be completed.          Additional Information About Your Visit        [employer] Information     [employer] gives you secure access to your electronic health record. If you see a primary care provider, you can also send messages to your care team and make appointments. If you have questions, please call your primary care clinic.  If you do not have a primary care provider, please call [phone] and they will assist you.        Care EveryWhere ID     This is your Care EveryWhere ID. This could be used by other organizations to access your [place] medical records  [id number]        Your Vitals Were     Pulse Temperature Respirations Height Last Period Pulse Oximetry    74 96.8  F (36  C) (Oral) 16 5' 8.5\" (1.74 m) 08/15/2010 98%    BMI (Body Mass Index)                   25.77 kg/m2            Blood Pressure from Last 3 Encounters:   10/31/17 108/65   06/09/17 110/66   05/26/17 116/68    Weight from Last 3 Encounters:   10/31/17 172 lb (78 kg)   06/09/17 170 lb 8 oz (77.3 kg)   05/26/17 175 lb 3.2 oz (79.5 kg)              We Performed the Following     Comprehensive metabolic panel (BMP + Alb, Alk Phos, ALT, AST, Total. Bili, TP)     DERMATOLOGY REFERRAL     Hemoglobin A1c     Lipid Profile (Chol, Trig, HDL, LDL calc)     TDAP VACCINE (ADACEL)          Today's Medication Changes          These changes are accurate as of: 10/31/17 10:22 AM.  If you have any questions, ask your nurse or doctor.               These medicines have changed or have updated prescriptions.        Dose/Directions    * lisinopril 20 MG tablet   Commonly known as:  PRINIVIL/ZESTRIL   This may have changed:  Another medication with the same name was added. Make sure you understand how and when to take each.   Used for:  Benign essential hypertension   Changed by:  Sara Thomas MD        Dose:  20 mg   Take 1 tablet (20 mg) by mouth daily   Quantity:  30 " tablet   Refills:  0       * lisinopril 10 MG tablet   Commonly known as:  PRINIVIL/ZESTRIL   This may have changed:  You were already taking a medication with the same name, and this prescription was added. Make sure you understand how and when to take each.   Used for:  Benign essential hypertension   Changed by:  Sara Thomas MD        Dose:  10 mg   Take 1 tablet (10 mg) by mouth daily   Quantity:  90 tablet   Refills:  1       * Notice:  This list has 2 medication(s) that are the same as other medications prescribed for you. Read the directions carefully, and ask your doctor or other care provider to review them with you.      Stop taking these medicines if you haven't already. Please contact your care team if you have questions.     benzonatate 200 MG capsule   Commonly known as:  TESSALON   Stopped by:  Sara Thomas MD           predniSONE 20 MG tablet   Commonly known as:  DELTASONE   Stopped by:  Sara Thomas MD                Where to get your medicines      These medications were sent to [facility] [address]  [address] [address]     Phone:  [phone]     albuterol 108 (90 BASE) MCG/ACT Inhaler    FLUoxetine 20 MG capsule    lisinopril 10 MG tablet                Primary Care Provider Office Phone # Fax #    Sara Thomas [phone]       [address] [facility] [address] [place] [address]        Equal Access to Services     [facility]: Hadii aad ku hadasho Soomaali, waaxda luqadaha, qaybta kaalmada adeegyada, jacey chicas hayavani nuñez . So [facility] [phone].    ATENCIÓN: Si habla español, tiene a duran disposición servicios gratuitos de asistencia lingüística. Llame al [phone].    We comply with applicable federal civil rights laws and [place] laws. We do not discriminate on the basis of race, color, national origin, age, disability, sex, sexual orientation, or gender identity.            Thank  you!     Thank you for choosing [facility]  for your care. Our goal is always to provide you with excellent care. Hearing back from our patients is one way we can continue to improve our services. Please take a few minutes to complete the written survey that you may receive in the mail after your visit with us. Thank you!             Your Updated Medication List - Protect others around you: Learn how to safely use, store and throw away your medicines at www.disposemymeds.org.          This list is accurate as of: 10/31/17 10:22 AM.  Always use your most recent med list.                   Brand Name Dispense Instructions for use Diagnosis    albuterol 108 (90 BASE) MCG/ACT Inhaler    PROAIR HFA/PROVENTIL HFA/VENTOLIN HFA    1 Inhaler    Inhale 2 puffs into the lungs every 6 hours as needed for shortness of breath / dyspnea or wheezing    Bronchitis       aspirin 325 MG tablet     qs    1 tab in am    Essential hypertension, benign       FLUoxetine 20 MG capsule    PROZAC    90 capsule    3 CAP PO QD (Once per day) in AM.    Mild recurrent major depression (H)       * lisinopril 20 MG tablet    PRINIVIL/ZESTRIL    30 tablet    Take 1 tablet (20 mg) by mouth daily    Benign essential hypertension       * lisinopril 10 MG tablet    PRINIVIL/ZESTRIL    90 tablet    Take 1 tablet (10 mg) by mouth daily    Benign essential hypertension       REFRESH 1 % ophthalmic solution   Generic drug:  carboxymethylcellulose     1    as needed        RITALIN 10 MG tablet   Generic drug:  methylphenidate      Take 10 mg by mouth 2 times daily. 1.5 in AM, 1.5 NOON, 20mg in PM if needed.        * Notice:  This list has 2 medication(s) that are the same as other medications prescribed for you. Read the directions carefully, and ask your doctor or other care provider to review them with you.

## 2017-10-31 NOTE — PROGRESS NOTES
SUBJECTIVE:   CC: Maggi Reynolds is an 62 year old woman who presents for preventive health visit.     Healthy Habits:    Do you get at least three servings of calcium containing foods daily (dairy, green leafy vegetables, etc.)? yes    Amount of exercise or daily activities, outside of work: 2-3 day(s) per week    Problems taking medications regularly No    Medication side effects: No    Have you had an eye exam in the past two years? yes    Do you see a dentist twice per year? yes    Do you have sleep apnea, excessive snoring or daytime drowsiness?no    1) FH of Dm type 2 and she wants to check her glucose levels   No symptoms   2) her BP has been on the lower side and she is wondering if she can decrease the dose of the lisinopril , she is currently on 20mg , has been on ti for a couple of yrs now , FH of HTN as well    3) depression, she continues to take 60 mg of Prozac and tnat seems to keep her depression under control, she denies any side effects snf she needs refills on it today .          Today's PHQ-2 Score: PHQ-2 ( 1999 Pf[employer]) 10/31/2017 6/9/2017   Q1: Little interest or pleasure in doing things 0 0   Q2: Feeling down, depressed or hopeless 0 0   PHQ-2 Score 0 0         Abuse: Current or Past(Physical, Sexual or Emotional)- No  Do you feel safe in your environment - Yes  Social History   Substance Use Topics     Smoking status: Never Smoker     Smokeless tobacco: Never Used     Alcohol use 0.0 oz/week     0 Standard drinks or equivalent per week      Comment: wine sometimes     The patient does not drink >3 drinks per day nor >7 drinks per week.    Reviewed orders with patient.  Reviewed health maintenance and updated orders accordingly - Yes  Labs reviewed in EPIC  BP Readings from Last 3 Encounters:   10/31/17 108/65   06/09/17 110/66   05/26/17 116/68    Wt Readings from Last 3 Encounters:   10/31/17 172 lb (78 kg)   06/09/17 170 lb 8 oz (77.3 kg)   05/26/17 175 lb 3.2 oz (79.5 kg)                   Patient Active Problem List   Diagnosis     Disorder of bone and cartilage     Disease of lung     Mild recurrent major depression (H)     CARDIOVASCULAR SCREENING; LDL GOAL LESS THAN 160     Hypertension goal BP (blood pressure) < 140/90     Advanced directives, counseling/discussion     Discharge from left nipple     Cervical lymphadenopathy     Past Surgical History:   Procedure Laterality Date     DILATION AND CURETTAGE  8/21/2013    Procedure: DILATION AND CURETTAGE;;  Surgeon: Tamar Walsh MD;  Location: [facility] OR     LAPAROSCOPIC SALPINGO-OOPHORECTOMY  8/21/2013    Procedure: LAPAROSCOPIC SALPINGO-OOPHORECTOMY;  Laparoscopic Bilateral Salpingo-Oophorectomy, Pelvic washings, Dilation and Curettage;  Surgeon: Tamar Walsh MD;  Location: [facility] OR     LUMPECTOMY BREAST Bilateral 5/31/2016    Procedure: LUMPECTOMY BREAST;  Surgeon: Barney Givens MD;  Location:  OR       Social History   Substance Use Topics     Smoking status: Never Smoker     Smokeless tobacco: Never Used     Alcohol use 0.0 oz/week     0 Standard drinks or equivalent per week      Comment: wine sometimes     Family History   Problem Relation Age of Onset     Arthritis Mother      OSTEOPOROSIS Mother      at age 75     HEART DISEASE Mother      arhythmia     Allergies Mother      seasonal     Alzheimer Disease Mother      at age 81     C.A.D. Father      bypass at age 78 and Pace maker at age 90     DIABETES Father      type 2     HEART DISEASE Father      GASTROINTESTINAL DISEASE Father      ulcers     Breast Cancer Maternal Grandmother      at age 40's     CEREBROVASCULAR DISEASE Maternal Grandfather      at age 70's     C.A.D. Paternal Grandmother      congestive heart failure     Respiratory Brother      bronchiestis         Current Outpatient Prescriptions   Medication Sig Dispense Refill     albuterol (PROAIR HFA/PROVENTIL HFA/VENTOLIN HFA) 108 (90 BASE) MCG/ACT Inhaler Inhale 2 puffs into the lungs every 6  hours as needed for shortness of breath / dyspnea or wheezing 1 Inhaler 3     FLUoxetine (PROZAC) 20 MG capsule 3 CAP PO QD (Once per day) in AM. 90 capsule 4     lisinopril (PRINIVIL/ZESTRIL) 10 MG tablet Take 1 tablet (10 mg) by mouth daily 90 tablet 1     lisinopril (PRINIVIL/ZESTRIL) 20 MG tablet Take 1 tablet (20 mg) by mouth daily 30 tablet 0     methylphenidate (RITALIN) 10 MG tablet Take 10 mg by mouth 2 times daily. 1.5 in AM, 1.5 NOON, 20mg in PM if needed.       ASPIRIN 325 MG OR TABS 1 tab in am qs prn     REFRESH 1 % OP SOLN as needed 1 0     [DISCONTINUED] FLUoxetine (PROZAC) 20 MG capsule 3 CAP PO QD (Once per day) in AM. 90 capsule 4     [DISCONTINUED] albuterol (PROAIR HFA/PROVENTIL HFA/VENTOLIN HFA) 108 (90 BASE) MCG/ACT Inhaler Inhale 2 puffs into the lungs every 6 hours as needed for shortness of breath / dyspnea or wheezing 1 Inhaler 3     Allergies   Allergen Reactions     No Known Allergies      Recent Labs   Lab Test  10/31/17   1025  03/31/16   1152  05/08/15   1141   11/01/10   1111  10/01/10   0759   A1C  5.2   --    --    --    --    --    LDL  85  128*   --    --    --   68   HDL  111  135   --    --    --   108   TRIG  56  59   --    --    --   60   ALT  47   --   30   --    --    --    CR  1.25*   --   1.05*   < >   --   1.01   GFRESTIMATED  43*   --   53*   < >   --   57*   GFRESTBLACK  52*   --   64   < >   --   69   POTASSIUM  4.6   --   4.7   < >   --   5.0   TSH   --   1.79   --    --   2.35   --     < > = values in this interval not displayed.              Patient over age 50, mutual decision to screen reflected in health maintenance.      Pertinent mammograms are reviewed under the imaging tab.  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     Other specified viral warts      Ovarian cyst      Unspecified essential hypertension      Unspecified symptom associated with female genital organs      Urinary tract infection, site not specified       Past Surgical History:   Procedure Laterality Date     DILATION AND CURETTAGE  8/21/2013    Procedure: DILATION AND CURETTAGE;;  Surgeon: Tamar Walsh MD;  Location: [facility] OR     LAPAROSCOPIC SALPINGO-OOPHORECTOMY  8/21/2013    Procedure: LAPAROSCOPIC SALPINGO-OOPHORECTOMY;  Laparoscopic Bilateral Salpingo-Oophorectomy, Pelvic washings, Dilation and Curettage;  Surgeon: Tamar Walsh MD;  Location: [facility] OR     LUMPECTOMY BREAST Bilateral 5/31/2016    Procedure: LUMPECTOMY BREAST;  Surgeon: Barney Givens MD;  Location:  OR       ROS:  C: NEGATIVE for fever, chills, change in weight  I: NEGATIVE for worrisome rashes, moles or lesions  E: NEGATIVE for vision changes or irritation  ENT: NEGATIVE for ear, mouth and throat problems  R: NEGATIVE for significant cough or SOB  B: NEGATIVE for masses, tenderness or discharge  CV: NEGATIVE for chest pain, palpitations or peripheral edema  GI: NEGATIVE for nausea, abdominal pain, heartburn, or change in bowel habits  : NEGATIVE for unusual urinary or vaginal symptoms. No vaginal bleeding.  M: NEGATIVE for significant arthralgias or myalgia  N: NEGATIVE for weakness, dizziness or paresthesias  P: NEGATIVE for changes in mood or affect     OBJECTIVE:   /65  Pulse 74  Temp 96.8  F (36  C) (Oral)  Resp 16  Ht 5' 8.5\" (1.74 m)  Wt 172 lb (78 kg)  LMP 08/15/2010  SpO2 98%  BMI 25.77 kg/m2  EXAM:  GENERAL: healthy, alert and no distress  EYES: Eyes grossly normal " to inspection, PERRL and conjunctivae and sclerae normal  HENT: ear canals and TM's normal, nose and mouth without ulcers or lesions  NECK: no adenopathy, no asymmetry, masses, or scars and thyroid normal to palpation  RESP: lungs clear to auscultation - no rales, rhonchi or wheezes  BREAST: normal without masses, tenderness or nipple discharge and no palpable axillary masses or adenopathy  CV: regular rate and rhythm, normal S1 S2, no S3 or S4, no murmur, click or rub, no peripheral edema and peripheral pulses strong  ABDOMEN: soft, nontender, no hepatosplenomegaly, no masses and bowel sounds normal  MS: no gross musculoskeletal defects noted, no edema  SKIN: no suspicious lesions EXCEPT there are two rashes , one is on the right upper chest about 1 cm in size oval and slight change in colors, the other is on the right buttocks and is about 2.5 c in size and some erythema in the center , no raised borders.  NEURO: Normal strength and tone, mentation intact and speech normal  PSYCH: mentation appears normal, affect normal/bright    ASSESSMENT/PLAN:   1. Encounter for routine adult health examination without abnormal findings  Discussed diet,calcium,exercise.Went over self breast exam.Thin prep was NOT 2 done.Eyes and teeth UTD.No immunizations needed today.See orders below for tests ordered and screening needed.    - TDAP VACCINE (ADACEL)    2. Bronchitis  Refilled, she only uses as needed.  - albuterol (PROAIR HFA/PROVENTIL HFA/VENTOLIN HFA) 108 (90 BASE) MCG/ACT Inhaler; Inhale 2 puffs into the lungs every 6 hours as needed for shortness of breath / dyspnea or wheezing  Dispense: 1 Inhaler; Refill: 3    3. Benign essential hypertension  We discussed cutting the dose in half , so she will be on 10mg of lisinopril and then should monitor her BP at home, recheck here in a month sooner if any concerns.  - lisinopril (PRINIVIL/ZESTRIL) 10 MG tablet; Take 1 tablet (10 mg) by mouth daily  Dispense: 90 tablet; Refill:  "1  - Lipid Profile (Chol, Trig, HDL, LDL calc)  - Comprehensive metabolic panel (BMP + Alb, Alk Phos, ALT, AST, Total. Bili, TP)    4. Mild recurrent major depression (H)  Will conntinue on the 60 mg of Prozac  - FLUoxetine (PROZAC) 20 MG capsule; 3 CAP PO QD (Once per day) in AM.  Dispense: 90 capsule; Refill: 4    5. Family history of diabetes mellitus  micha check HGB A 1 c   - Hemoglobin A1c    6. Rash  Has had several new moles/rash on the right buttocks and on mono the right shoulder   - DERMATOLOGY REFERRAL    COUNSELING:   Reviewed preventive health counseling, as reflected in patient instructions       Regular exercise       Healthy diet/nutrition       Vision screening       Hearing screening       Osteoporosis Prevention/Bone Health         reports that she has never smoked. She has never used smokeless tobacco.    Estimated body mass index is 25.77 kg/(m^2) as calculated from the following:    Height as of this encounter: 5' 8.5\" (1.74 m).    Weight as of this encounter: 172 lb (78 kg).         Counseling Resources:  ATP IV Guidelines  Pooled Cohorts Equation Calculator  Breast Cancer Risk Calculator  FRAX Risk Assessment  ICSI Preventive Guidelines  Dietary Guidelines for Americans, 2010  USDA's MyPlate  ASA Prophylaxis  Lung CA Screening    Sara Thomas MD  [facility]  "

## 2017-11-09 ENCOUNTER — TRANSFERRED RECORDS (OUTPATIENT)
Dept: HEALTH INFORMATION MANAGEMENT | Facility: CLINIC | Age: 62
End: 2017-11-09

## 2017-11-28 ENCOUNTER — TRANSFERRED RECORDS (OUTPATIENT)
Dept: HEALTH INFORMATION MANAGEMENT | Facility: CLINIC | Age: 62
End: 2017-11-28

## 2018-03-06 ENCOUNTER — OFFICE VISIT (OUTPATIENT)
Dept: FAMILY MEDICINE | Facility: CLINIC | Age: 63
End: 2018-03-06
Payer: COMMERCIAL

## 2018-03-06 VITALS
HEIGHT: 69 IN | BODY MASS INDEX: 25.62 KG/M2 | OXYGEN SATURATION: 97 % | HEART RATE: 80 BPM | TEMPERATURE: 98.1 F | DIASTOLIC BLOOD PRESSURE: 71 MMHG | RESPIRATION RATE: 16 BRPM | SYSTOLIC BLOOD PRESSURE: 119 MMHG | WEIGHT: 173 LBS

## 2018-03-06 DIAGNOSIS — Z12.31 VISIT FOR SCREENING MAMMOGRAM: Primary | ICD-10-CM

## 2018-03-06 DIAGNOSIS — R22.1 LUMP IN NECK: ICD-10-CM

## 2018-03-06 DIAGNOSIS — H66.90 ACUTE OTITIS MEDIA, UNSPECIFIED OTITIS MEDIA TYPE: ICD-10-CM

## 2018-03-06 PROCEDURE — 99214 OFFICE O/P EST MOD 30 MIN: CPT | Performed by: FAMILY MEDICINE

## 2018-03-06 RX ORDER — AZITHROMYCIN 250 MG/1
TABLET, FILM COATED ORAL
Qty: 6 TABLET | Refills: 0 | Status: SHIPPED | OUTPATIENT
Start: 2018-03-06 | End: 2019-01-16

## 2018-03-06 NOTE — PROGRESS NOTES
SUBJECTIVE:   Maggi Reynolds is a 62 year old female who presents to clinic today for the following health issues:      Concern - lump   Onset: 3 week     Description:   Lump on right side of neck discomfort eating or swallowing     Intensity: moderate    Progression of Symptoms:  constant    Accompanying Signs & Symptoms:  Right ear pain and discomfort     Previous history of similar problem:   no    Precipitating factors:   Worsened by: nothing     Alleviating factors:  Improved by: nothing     Therapies Tried and outcome: none         Problem list and histories reviewed & adjusted, as indicated.  Additional history: as documented    Patient Active Problem List   Diagnosis     Disorder of bone and cartilage     Disease of lung     Mild recurrent major depression (H)     CARDIOVASCULAR SCREENING; LDL GOAL LESS THAN 160     Hypertension goal BP (blood pressure) < 140/90     Advanced directives, counseling/discussion     Discharge from left nipple     Cervical lymphadenopathy     Past Surgical History:   Procedure Laterality Date     DILATION AND CURETTAGE  8/21/2013    Procedure: DILATION AND CURETTAGE;;  Surgeon: Tamar Walsh MD;  [facility]     LAPAROSCOPIC SALPINGO-OOPHORECTOMY  8/21/2013    Procedure: LAPAROSCOPIC SALPINGO-OOPHORECTOMY;  Laparoscopic Bilateral Salpingo-Oophorectomy, Pelvic washings, Dilation and Curettage;  Surgeon: Tamar Walsh MD;  Location:  OR     LUMPECTOMY BREAST Bilateral 5/31/2016    Procedure: LUMPECTOMY BREAST;  Surgeon: Barney Givens MD;  Location:  OR       Social History   Substance Use Topics     Smoking status: Never Smoker     Smokeless tobacco: Never Used     Alcohol use 0.0 oz/week     0 Standard drinks or equivalent per week      Comment: wine sometimes     Family History   Problem Relation Age of Onset     Arthritis Mother      OSTEOPOROSIS Mother      at age 75     HEART DISEASE Mother      arhythmia     Allergies Mother       seasonal     Alzheimer Disease Mother      at age 81     C.A.D. Father      bypass at age 78 and Pace maker at age 90     DIABETES Father      type 2     HEART DISEASE Father      GASTROINTESTINAL DISEASE Father      ulcers     Breast Cancer Maternal Grandmother      at age 40's     CEREBROVASCULAR DISEASE Maternal Grandfather      at age 70's     C.A.D. Paternal Grandmother      congestive heart failure     Respiratory Brother      bronchiestis         Current Outpatient Prescriptions   Medication Sig Dispense Refill     azithromycin (ZITHROMAX) 250 MG tablet Two tablets first day, then one tablet daily for four days. 6 tablet 0     albuterol (PROAIR HFA/PROVENTIL HFA/VENTOLIN HFA) 108 (90 BASE) MCG/ACT Inhaler Inhale 2 puffs into the lungs every 6 hours as needed for shortness of breath / dyspnea or wheezing 1 Inhaler 3     FLUoxetine (PROZAC) 20 MG capsule 3 CAP PO QD (Once per day) in AM. 90 capsule 4     lisinopril (PRINIVIL/ZESTRIL) 10 MG tablet Take 1 tablet (10 mg) by mouth daily 90 tablet 1     lisinopril (PRINIVIL/ZESTRIL) 20 MG tablet Take 1 tablet (20 mg) by mouth daily 30 tablet 0     methylphenidate (RITALIN) 10 MG tablet Take 10 mg by mouth 2 times daily. 1.5 in AM, 1.5 NOON, 20mg in PM if needed.       ASPIRIN 325 MG OR TABS 1 tab in am qs prn     REFRESH 1 % OP SOLN as needed 1 0     Allergies   Allergen Reactions     No Known Allergies      Recent Labs   Lab Test  10/31/17   1025  03/31/16   1152  05/08/15   1141   11/01/10   1111  10/01/10   0759   A1C  5.2   --    --    --    --    --    LDL  85  128*   --    --    --   68   HDL  111  135   --    --    --   108   TRIG  56  59   --    --    --   60   ALT  47   --   30   --    --    --    CR  1.25*   --   1.05*   < >   --   1.01   GFRESTIMATED  43*   --   53*   < >   --   57*   GFRESTBLACK  52*   --   64   < >   --   69   POTASSIUM  4.6   --   4.7   < >   --   5.0   TSH   --   1.79   --    --   2.35   --     < > = values in this interval not  "displayed.      BP Readings from Last 3 Encounters:   03/06/18 119/71   10/31/17 108/65   06/09/17 110/66    Wt Readings from Last 3 Encounters:   03/06/18 173 lb (78.5 kg)   10/31/17 172 lb (78 kg)   06/09/17 170 lb 8 oz (77.3 kg)                  Labs reviewed in EPIC    Reviewed and updated as needed this visit by clinical staff       Reviewed and updated as needed this visit by Provider         ROS:  Constitutional, HEENT, cardiovascular, pulmonary, GI, , musculoskeletal, neuro, skin, endocrine and psych systems are negative, except as otherwise noted.    OBJECTIVE:     /71  Pulse 80  Temp 98.1  F (36.7  C) (Oral)  Resp 16  Ht 5' 8.5\" (1.74 m)  Wt 173 lb (78.5 kg)  LMP 08/15/2010  SpO2 97%  BMI 25.92 kg/m2  Body mass index is 25.92 kg/(m^2).  GENERAL: healthy, alert and no distress  HENT: ear canals and TM's normal, nose and mouth without ulcers or lesions  NECK: cervical adenopathy anterior  cervical right side of the neck , no asymmetry, masses, or scars and thyroid normal to palpation EXCEPT an enlarged lymph node on the right anterior cervical nodes area and is about 1.5 cm in size , soft , non erythematous, non tender to the touch   RESP: lungs clear to auscultation - no rales, rhonchi or wheezes  CV: regular rate and rhythm, normal S1 S2, no S3 or S4, no murmur, click or rub, no peripheral edema and peripheral pulses strong  MS: no gross musculoskeletal defects noted, no edema  NEURO: Normal strength and tone, mentation intact and speech normal    Diagnostic Test Results:  none     ASSESSMENT/PLAN:       1. Visit for screening mammogram  She is due for a mammogram and I have given her a referral for that , she will make the appointment   - MA SCREENING DIGITAL BILAT - Future  (s+30); Future    2. Lump in neck  Discussed since she has had the lump for several months now , will attempt treating with Abx but if this does not go away in 2 to 3 weeks should schedule an appointment with ENT and I " have given her the referral.  - US Head Neck Soft Tissue; Future  - OTOLARYNGOLOGY REFERRAL    3. Acute otitis media, unspecified otitis media type  Will treat with Abx   - azithromycin (ZITHROMAX) 250 MG tablet; Two tablets first day, then one tablet daily for four days.  Dispense: 6 tablet; Refill: 0    RTC if no improving or worsening.  Pt is aware  and comfortable with the current plan.      Sara Thomas MD  [facility]

## 2018-03-06 NOTE — PATIENT INSTRUCTIONS
PLEASE CALL TO SCHEDULE YOUR MAMMOGRAM  [facility] [phone]  [facility] [phone]

## 2018-03-06 NOTE — NURSING NOTE
"Chief Complaint   Patient presents with     Mass     /71  Pulse 80  Temp 98.1  F (36.7  C) (Oral)  Resp 16  Ht 5' 8.5\" (1.74 m)  Wt 173 lb (78.5 kg)  LMP 08/15/2010  SpO2 97%  BMI 25.92 kg/m2 Estimated body mass index is 25.92 kg/(m^2) as calculated from the following:    Height as of this encounter: 5' 8.5\" (1.74 m).    Weight as of this encounter: 173 lb (78.5 kg).  bp completed using cuff size: regular       Health Maintenance addressed:  Mammogram and PHQ9    Possibly completing today per provider review.    Karley Sandoval MA     "

## 2018-03-06 NOTE — MR AVS SNAPSHOT
After Visit Summary   3/6/2018    Maggi Reynolds    MRN: [id number]           Patient Information     Date Of Birth          1955        Visit Information        Provider Department      3/6/2018 11:00 AM Sara Thomas MD [facility]        Today's Diagnoses     Visit for screening mammogram    -  1    Lump in neck        Acute otitis media, unspecified otitis media type          Care Instructions    PLEASE CALL TO SCHEDULE YOUR MAMMOGRAM  [facility] [phone]  [facility] [phone]              Follow-ups after your visit        Additional Services     OTOLARYNGOLOGY REFERRAL       Your provider has referred you to: [facility]: [facility] (Otolaryngology) - [place] [phone]  [email or web address]    Please be aware that coverage of these services is subject to the terms and limitations of your health insurance plan.  Call member services at your health plan with any benefit or coverage questions.      Please bring the following with you to your appointment:    (1) Any X-Rays, CTs or MRIs which have been performed.  Contact the facility where they were done to arrange for  prior to your scheduled appointment.   (2) List of current medications  (3) This referral request   (4) Any documents/labs given to you for this referral                  Future tests that were ordered for you today     Open Future Orders        Priority Expected Expires Ordered    US Head Neck Soft Tissue Routine  3/6/2019 3/6/2018    MA SCREENING DIGITAL BILAT - Future  (s+30) Routine  3/6/2019 3/6/2018            Who to contact     If you have questions or need follow up information about today's clinic visit or your schedule please contact [facility] directly at [phone].  Normal or non-critical lab and imaging results will be communicated to you by Anna  "letter or phone within 4 business days after the clinic has received the results. If you do not hear from us within 7 days, please contact the clinic through twidox or phone. If you have a critical or abnormal lab result, we will notify you by phone as soon as possible.  Submit refill requests through twidox or call your pharmacy and they will forward the refill request to us. Please allow 3 business days for your refill to be completed.          Additional Information About Your Visit        [facility] Information     twidox gives you secure access to your electronic health record. If you see a primary care provider, you can also send messages to your care team and make appointments. If you have questions, please call your primary care clinic.  If you do not have a primary care provider, please call [phone] and they will assist you.        Care EveryWhere ID     This is your Care EveryWhere ID. This could be used by other organizations to access your [facility] medical records  [id number]        Your Vitals Were     Pulse Temperature Respirations Height Last Period Pulse Oximetry    80 98.1  F (36.7  C) (Oral) 16 5' 8.5\" (1.74 m) 08/15/2010 97%    BMI (Body Mass Index)                   25.92 kg/m2            Blood Pressure from Last 3 Encounters:   03/06/18 119/71   10/31/17 108/65   06/09/17 110/66    Weight from Last 3 Encounters:   03/06/18 173 lb (78.5 kg)   10/31/17 172 lb (78 kg)   06/09/17 170 lb 8 oz (77.3 kg)              We Performed the Following     OTOLARYNGOLOGY REFERRAL          Today's Medication Changes          These changes are accurate as of 3/6/18 11:23 AM.  If you have any questions, ask your nurse or doctor.               Start taking these medicines.        Dose/Directions    azithromycin 250 MG tablet   Commonly known as:  ZITHROMAX   Used for:  Acute otitis media, unspecified otitis media type   Started by:  Sara Thomas MD        Two tablets first day, then one tablet daily for " four days.   Quantity:  6 tablet   Refills:  0         Stop taking these medicines if you haven't already. Please contact your care team if you have questions.     benzonatate 200 MG capsule   Commonly known as:  TESSALON   Stopped by:  Sara Thomas MD                Where to get your medicines      These medications were sent to [facility] [place], [place] - [address]  [address], [facility] [address]     Phone:  [phone]     azithromycin 250 MG tablet                Primary Care Provider Office Phone # Fax #    Sara Thomas [phone]       [address] [facility]   [place] [address]        Equal Access to Services     [facility] : Hadii giovana fitzgerald hadasho Sojamesali, waaxda luqadaha, qaybta kaalmada adeegyada, jacey thomasin audie nuñez . So [facility] [phone].    ATENCIÓN: Si habla español, tiene a duran disposición servicios gratuitos de asistencia lingüística. Llame al [phone].    We comply with applicable federal civil rights laws and [place] laws. We do not discriminate on the basis of race, color, national origin, age, disability, sex, sexual orientation, or gender identity.            Thank you!     Thank you for choosing [facility]  for your care. Our goal is always to provide you with excellent care. Hearing back from our patients is one way we can continue to improve our services. Please take a few minutes to complete the written survey that you may receive in the mail after your visit with us. Thank you!             Your Updated Medication List - Protect others around you: Learn how to safely use, store and throw away your medicines at www.disposemymeds.org.          This list is accurate as of 3/6/18 11:23 AM.  Always use your most recent med list.                   Brand Name Dispense Instructions for use Diagnosis    albuterol 108 (90 BASE) MCG/ACT Inhaler    PROAIR HFA/PROVENTIL HFA/VENTOLIN HFA    1 Inhaler     Inhale 2 puffs into the lungs every 6 hours as needed for shortness of breath / dyspnea or wheezing    Bronchitis       aspirin 325 MG tablet     qs    1 tab in am    Essential hypertension, benign       azithromycin 250 MG tablet    ZITHROMAX    6 tablet    Two tablets first day, then one tablet daily for four days.    Acute otitis media, unspecified otitis media type       FLUoxetine 20 MG capsule    PROZAC    90 capsule    3 CAP PO QD (Once per day) in AM.    Mild recurrent major depression (H)       * lisinopril 20 MG tablet    PRINIVIL/ZESTRIL    30 tablet    Take 1 tablet (20 mg) by mouth daily    Benign essential hypertension       * lisinopril 10 MG tablet    PRINIVIL/ZESTRIL    90 tablet    Take 1 tablet (10 mg) by mouth daily    Benign essential hypertension       REFRESH 1 % ophthalmic solution   Generic drug:  carboxymethylcellulose     1    as needed        RITALIN 10 MG tablet   Generic drug:  methylphenidate      Take 10 mg by mouth 2 times daily. 1.5 in AM, 1.5 NOON, 20mg in PM if needed.        * Notice:  This list has 2 medication(s) that are the same as other medications prescribed for you. Read the directions carefully, and ask your doctor or other care provider to review them with you.

## 2018-03-07 ASSESSMENT — PATIENT HEALTH QUESTIONNAIRE - PHQ9: SUM OF ALL RESPONSES TO PHQ QUESTIONS 1-9: 1

## 2018-04-10 NOTE — TELEPHONE ENCOUNTER
FUTURE VISIT INFORMATION      FUTURE VISIT INFORMATION:    Date: 05/03/2018    Time: 3:30    Location: [facility]  REFERRAL INFORMATION:    Referring provider:  DR. HAWLEY    Referring providers clinic:   FAMILY PRACTICE    Reason for visit/diagnosis  RIGHT NECK LUMP        RECORDS STATUS    ALL RECORDS IN EPIC OFFICE VISIT: 03/06/2018, 10/31/2017, 05/26/2017, 05/27/2016, 03/31/2016 & 05/08/2015    IMAGES: US NECK SOFT TISSUE 05/04/2016  IMAGES IN PACS

## 2018-04-11 ENCOUNTER — RADIANT APPOINTMENT (OUTPATIENT)
Dept: ULTRASOUND IMAGING | Facility: CLINIC | Age: 63
End: 2018-04-11
Attending: FAMILY MEDICINE
Payer: COMMERCIAL

## 2018-04-11 ENCOUNTER — RADIANT APPOINTMENT (OUTPATIENT)
Dept: MAMMOGRAPHY | Facility: CLINIC | Age: 63
End: 2018-04-11
Payer: COMMERCIAL

## 2018-04-11 DIAGNOSIS — R22.1 LUMP IN NECK: ICD-10-CM

## 2018-04-11 DIAGNOSIS — Z12.31 VISIT FOR SCREENING MAMMOGRAM: ICD-10-CM

## 2018-05-03 ENCOUNTER — PRE VISIT (OUTPATIENT)
Dept: OTOLARYNGOLOGY | Facility: CLINIC | Age: 63
End: 2018-05-03

## 2018-06-04 DIAGNOSIS — F33.0 MILD RECURRENT MAJOR DEPRESSION (H): ICD-10-CM

## 2018-06-04 DIAGNOSIS — I10 BENIGN ESSENTIAL HYPERTENSION: ICD-10-CM

## 2018-06-04 NOTE — TELEPHONE ENCOUNTER
"Requested Prescriptions   Pending Prescriptions Disp Refills     lisinopril (PRINIVIL/ZESTRIL) 10 MG tablet 90 tablet 1     Sig: Take 1 tablet (10 mg) by mouth daily    ACE Inhibitors (Including Combos) Protocol Failed    6/4/2018 10:14 AM       Failed - Normal serum creatinine on file in past 12 months    Recent Labs   Lab Test  10/31/17   1025   CR  1.25*            Passed - Blood pressure under 140/90 in past 12 months    BP Readings from Last 3 Encounters:   03/06/18 119/71   10/31/17 108/65   06/09/17 110/66                Passed - Recent (12 mo) or future (30 days) visit within the authorizing provider's specialty    Patient had office visit in the last 12 months or has a visit in the next 30 days with authorizing provider or within the authorizing provider's specialty.  See \"Patient Info\" tab in inbasket, or \"Choose Columns\" in Meds & Orders section of the refill encounter.           Passed - Patient is age 18 or older       Passed - No active pregnancy on record       Passed - Normal serum potassium on file in past 12 months    Recent Labs   Lab Test  10/31/17   1025   POTASSIUM  4.6            Passed - No positive pregnancy test in past 12 months        FLUoxetine (PROZAC) 20 MG capsule 90 capsule 4     Sig: 3 CAP PO QD (Once per day) in AM.    SSRIs Protocol Passed    6/4/2018 10:14 AM       Passed - PHQ-9 score less than 5 in past 6 months    Please review last PHQ-9 score.          Passed - Patient is age 18 or older       Passed - No active pregnancy on record       Passed - No positive pregnancy test in last 12 months       Passed - Recent (6 mo) or future (30 days) visit within the authorizing provider's specialty    Patient had office visit in the last 6 months or has a visit in the next 30 days with authorizing provider or within the authorizing provider's specialty.  See \"Patient Info\" tab in inbasket, or \"Choose Columns\" in Meds & Orders section of the refill encounter.            "

## 2018-06-04 NOTE — TELEPHONE ENCOUNTER
Prozac:  Prescription approved per [facility] Refill Protocol.    Lisinopril:  Routing refill request to provider for review/approval because:  Labs out of range:  Creatinine    Jacinta CONTRERAS RN

## 2018-06-05 RX ORDER — LISINOPRIL 10 MG/1
10 TABLET ORAL DAILY
Qty: 90 TABLET | Refills: 1 | Status: SHIPPED | OUTPATIENT
Start: 2018-06-05 | End: 2018-12-21

## 2018-07-10 ENCOUNTER — TELEPHONE (OUTPATIENT)
Dept: FAMILY MEDICINE | Facility: CLINIC | Age: 63
End: 2018-07-10

## 2018-07-10 NOTE — TELEPHONE ENCOUNTER
Not on current medication list.  Last Rx 6/9/17 for cough.  Per LS needs to be seen.  VM left informing patient.  Carolyn Peña RN

## 2018-09-07 DIAGNOSIS — F33.0 MILD RECURRENT MAJOR DEPRESSION (H): ICD-10-CM

## 2018-09-07 NOTE — TELEPHONE ENCOUNTER
"Requested Prescriptions   Pending Prescriptions Disp Refills     FLUoxetine (PROZAC) 20 MG capsule  Last Written Prescription Date:  6/4/18  Last Fill Quantity: 270 CAPSULE,  # refills: 0   Last office visit: 3/6/2018 with prescribing provider:  MARLEEN   Future Office Visit:     270 capsule 0     Sig: 3 CAP PO QD (Once per day) in AM.    SSRIs Protocol Failed    9/7/2018  1:38 PM       Failed - PHQ-9 score less than 5 in past 6 months    Please review last PHQ-9 score.   PHQ-9 SCORE 3/31/2016 6/9/2017 3/6/2018   Total Score - - -   Total Score 5 2 1     KRYSTLE-7 SCORE 6/9/2017   Total Score 1                Failed - Recent (6 mo) or future (30 days) visit within the authorizing provider's specialty    Patient had office visit in the last 6 months or has a visit in the next 30 days with authorizing provider or within the authorizing provider's specialty.  See \"Patient Info\" tab in inbasket, or \"Choose Columns\" in Meds & Orders section of the refill encounter.           Passed - Patient is age 18 or older       Passed - No active pregnancy on record       Passed - No positive pregnancy test in last 12 months          "

## 2018-09-10 NOTE — TELEPHONE ENCOUNTER
Medication is being filled for 1 time refill only due to:  being due for physical end of Oct 2018   Jacinta CONTRERAS RN

## 2018-12-20 DIAGNOSIS — I10 BENIGN ESSENTIAL HYPERTENSION: ICD-10-CM

## 2018-12-21 RX ORDER — LISINOPRIL 10 MG/1
10 TABLET ORAL DAILY
Qty: 30 TABLET | Refills: 0 | Status: SHIPPED | OUTPATIENT
Start: 2018-12-21 | End: 2019-02-05

## 2018-12-21 NOTE — TELEPHONE ENCOUNTER
"Medication is being filled for 1 time refill only due to:  Patient needs to be seen because due for physical and labs.   Jacinta CONTRERAS RN    Requested Prescriptions   Pending Prescriptions Disp Refills     lisinopril (PRINIVIL/ZESTRIL) 10 MG tablet 90 tablet 1     Sig: Take 1 tablet (10 mg) by mouth daily    ACE Inhibitors (Including Combos) Protocol Failed - 12/20/2018 11:53 AM       Failed - Normal serum creatinine on file in past 12 months    Recent Labs   Lab Test 10/31/17  1025   CR 1.25*            Failed - Normal serum potassium on file in past 12 months    Recent Labs   Lab Test 10/31/17  1025   POTASSIUM 4.6            Passed - Blood pressure under 140/90 in past 12 months    BP Readings from Last 3 Encounters:   03/06/18 119/71   10/31/17 108/65   06/09/17 110/66                Passed - Recent (12 mo) or future (30 days) visit within the authorizing provider's specialty    Patient had office visit in the last 12 months or has a visit in the next 30 days with authorizing provider or within the authorizing provider's specialty.  See \"Patient Info\" tab in inbasket, or \"Choose Columns\" in Meds & Orders section of the refill encounter.             Passed - Patient is age 18 or older       Passed - No active pregnancy on record       Passed - No positive pregnancy test in past 12 months            "

## 2019-01-16 ENCOUNTER — OFFICE VISIT (OUTPATIENT)
Dept: FAMILY MEDICINE | Facility: CLINIC | Age: 64
End: 2019-01-16
Payer: COMMERCIAL

## 2019-01-16 VITALS
BODY MASS INDEX: 26.88 KG/M2 | SYSTOLIC BLOOD PRESSURE: 110 MMHG | RESPIRATION RATE: 16 BRPM | OXYGEN SATURATION: 98 % | WEIGHT: 181.5 LBS | HEIGHT: 69 IN | DIASTOLIC BLOOD PRESSURE: 64 MMHG | HEART RATE: 74 BPM

## 2019-01-16 DIAGNOSIS — J02.9 SORE THROAT: ICD-10-CM

## 2019-01-16 DIAGNOSIS — R53.83 FATIGUE, UNSPECIFIED TYPE: Primary | ICD-10-CM

## 2019-01-16 LAB
DEPRECATED S PYO AG THROAT QL EIA: NORMAL
ERYTHROCYTE [DISTWIDTH] IN BLOOD BY AUTOMATED COUNT: 13 % (ref 10–15)
HCT VFR BLD AUTO: 37.3 % (ref 35–47)
HETEROPH AB SER QL: NEGATIVE
HGB BLD-MCNC: 12.1 G/DL (ref 11.7–15.7)
MCH RBC QN AUTO: 30 PG (ref 26.5–33)
MCHC RBC AUTO-ENTMCNC: 32.4 G/DL (ref 31.5–36.5)
MCV RBC AUTO: 92 FL (ref 78–100)
PLATELET # BLD AUTO: 258 10E9/L (ref 150–450)
RBC # BLD AUTO: 4.04 10E12/L (ref 3.8–5.2)
SPECIMEN SOURCE: NORMAL
T3FREE SERPL-MCNC: 2.7 PG/ML (ref 2.3–4.2)
WBC # BLD AUTO: 6.7 10E9/L (ref 4–11)

## 2019-01-16 PROCEDURE — 80048 BASIC METABOLIC PNL TOTAL CA: CPT | Performed by: FAMILY MEDICINE

## 2019-01-16 PROCEDURE — 84481 FREE ASSAY (FT-3): CPT | Performed by: FAMILY MEDICINE

## 2019-01-16 PROCEDURE — 84439 ASSAY OF FREE THYROXINE: CPT | Performed by: FAMILY MEDICINE

## 2019-01-16 PROCEDURE — 84443 ASSAY THYROID STIM HORMONE: CPT | Performed by: FAMILY MEDICINE

## 2019-01-16 PROCEDURE — 87880 STREP A ASSAY W/OPTIC: CPT | Performed by: FAMILY MEDICINE

## 2019-01-16 PROCEDURE — 36415 COLL VENOUS BLD VENIPUNCTURE: CPT | Performed by: FAMILY MEDICINE

## 2019-01-16 PROCEDURE — 85027 COMPLETE CBC AUTOMATED: CPT | Performed by: FAMILY MEDICINE

## 2019-01-16 PROCEDURE — 86308 HETEROPHILE ANTIBODY SCREEN: CPT | Performed by: FAMILY MEDICINE

## 2019-01-16 PROCEDURE — 87081 CULTURE SCREEN ONLY: CPT | Performed by: FAMILY MEDICINE

## 2019-01-16 PROCEDURE — 99214 OFFICE O/P EST MOD 30 MIN: CPT | Performed by: FAMILY MEDICINE

## 2019-01-16 ASSESSMENT — MIFFLIN-ST. JEOR: SCORE: 1429.72

## 2019-01-16 NOTE — PROGRESS NOTES
SUBJECTIVE:   Maggi Reynolds is a 63 year old female who presents to clinic today for the following health issues:      RESPIRATORY SYMPTOMS      Duration: 6-8 weeks    Description  sore throat, facial pain/pressure, cough and fatigue/malaise    Severity: moderate- severe at times    Accompanying signs and symptoms: None    History (predisposing factors):  none    Precipitating or alleviating factors: None    Therapies tried and outcome:  none    Pt states she has had extreme fatigue for the past 8 weeks.  She describes sore throat which radiates to the left ear at times.  She feels like at the base of her neck she has a lump in her throat. Notices more when she is lying down.  She feels like her fatigue is worse and is sleeping almost daily after work.  Weight is up 8 pounds since her last visit 10 months prior.  She admits to nails brittle, skin dry.  She denies any constipation.  She admits to intermittent headaches.  She denies bowel issues.    Hx of HTN - takes lisinopril   Taking prozac 60mg daily for very long time    -------------------------------------    Problem list and histories reviewed & adjusted, as indicated.  Additional history: as documented    Patient Active Problem List   Diagnosis     Disorder of bone and cartilage     Disease of lung     Mild recurrent major depression (H)     CARDIOVASCULAR SCREENING; LDL GOAL LESS THAN 160     Hypertension goal BP (blood pressure) < 140/90     Advanced directives, counseling/discussion     Discharge from left nipple     Cervical lymphadenopathy     Past Surgical History:   Procedure Laterality Date     DILATION AND CURETTAGE  8/21/2013    Procedure: DILATION AND CURETTAGE;;  Surgeon: Tamar Walsh MD;  Location: [facility]     LAPAROSCOPIC SALPINGO-OOPHORECTOMY  8/21/2013    Procedure: LAPAROSCOPIC SALPINGO-OOPHORECTOMY;  Laparoscopic Bilateral Salpingo-Oophorectomy, Pelvic washings, Dilation and Curettage;  Surgeon: Tamar Walsh MD;   "Location: [facility] OR     LUMPECTOMY BREAST Bilateral 5/31/2016    Procedure: LUMPECTOMY BREAST;  Surgeon: Barney Givens MD;  Location:  OR       Social History     Tobacco Use     Smoking status: Never Smoker     Smokeless tobacco: Never Used   Substance Use Topics     Alcohol use: Yes     Alcohol/week: 0.0 oz     Comment: wine sometimes     Family History   Problem Relation Age of Onset     Arthritis Mother      Osteoporosis Mother         at age 75     Heart Disease Mother         arhythmia     Allergies Mother         seasonal     Alzheimer Disease Mother         at age 81     C.A.D. Father         bypass at age 78 and Pace maker at age 90     Diabetes Father         type 2     Heart Disease Father      Gastrointestinal Disease Father         ulcers     Breast Cancer Maternal Grandmother         at age 40's     Cerebrovascular Disease Maternal Grandfather         at age 70's     C.A.D. Paternal Grandmother         congestive heart failure     Respiratory Brother         bronchiestis           Reviewed and updated as needed this visit by clinical staff  Tobacco  Allergies  Meds  Problems  Med Hx  Surg Hx  Fam Hx       Reviewed and updated as needed this visit by Provider  Tobacco  Allergies  Meds  Problems  Med Hx  Surg Hx  Fam Hx         ROS:  Constitutional, HEENT, cardiovascular, pulmonary, GI, , musculoskeletal, neuro, skin, endocrine and psych systems are negative, except as otherwise noted.    OBJECTIVE:     /64   Pulse 74   Resp 16   Ht 1.74 m (5' 8.5\")   Wt 82.3 kg (181 lb 8 oz)   LMP 08/15/2010   SpO2 98%   BMI 27.20 kg/m    Body mass index is 27.2 kg/m .  GENERAL: healthy, alert and no distress  HENT: ear canals and TM's normal, nose and mouth without ulcers or lesions  NECK: bilateral anterior cervical adenopathy, no asymmetry, masses, or scars and thyroid normal to palpation  RESP: lungs clear to auscultation - no rales, rhonchi or wheezes  CV: regular rate and " rhythm, normal S1 S2, no S3 or S4, no murmur, click or rub, no peripheral edema and peripheral pulses strong  SKIN: dry skin    Diagnostic Test Results:  Results for orders placed or performed in visit on 01/16/19 (from the past 24 hour(s))   Strep, Rapid Screen   Result Value Ref Range    Specimen Description Throat     Rapid Strep A Screen       NEGATIVE: No Group A streptococcal antigen detected by immunoassay, await culture report.   CBC with platelets   Result Value Ref Range    WBC 6.7 4.0 - 11.0 10e9/L    RBC Count 4.04 3.8 - 5.2 10e12/L    Hemoglobin 12.1 11.7 - 15.7 g/dL    Hematocrit 37.3 35.0 - 47.0 %    MCV 92 78 - 100 fl    MCH 30.0 26.5 - 33.0 pg    MCHC 32.4 31.5 - 36.5 g/dL    RDW 13.0 10.0 - 15.0 %    Platelet Count 258 150 - 450 10e9/L   Mononucleosis screen   Result Value Ref Range    Mononucleosis Screen Negative NEG^Negative       ASSESSMENT/PLAN:     1. Fatigue, unspecified type  Unclear etiology for her fatigue -   Wonder about thyroid based on symptoms  Will also check CBC and BMP to r/o other possible causes.  Discussed possibility of worsening mood/depression however nothing has changed and she has been stable on Prozac for many years.    Will check labs - if issue will treat -     - TSH  - T3, Free  - T4, free  - CBC with platelets  - Basic metabolic panel  (Ca, Cl, CO2, Creat, Gluc, K, Na, BUN)    2. Sore throat  Strep negative   Mono negative   Will monitor  - CBC with platelets  - Mononucleosis screen  - Beta strep group A culture    Pt will call or RTC if symptoms worsen or do not improve.      Joseline Whelan, DO  [facility]

## 2019-01-17 LAB
ANION GAP SERPL CALCULATED.3IONS-SCNC: 6 MMOL/L (ref 3–14)
BACTERIA SPEC CULT: NORMAL
BUN SERPL-MCNC: 24 MG/DL (ref 7–30)
CALCIUM SERPL-MCNC: 9.5 MG/DL (ref 8.5–10.1)
CHLORIDE SERPL-SCNC: 104 MMOL/L (ref 94–109)
CO2 SERPL-SCNC: 27 MMOL/L (ref 20–32)
CREAT SERPL-MCNC: 1.29 MG/DL (ref 0.52–1.04)
GFR SERPL CREATININE-BSD FRML MDRD: 44 ML/MIN/{1.73_M2}
GLUCOSE SERPL-MCNC: 82 MG/DL (ref 70–99)
POTASSIUM SERPL-SCNC: 4.4 MMOL/L (ref 3.4–5.3)
SODIUM SERPL-SCNC: 137 MMOL/L (ref 133–144)
SPECIMEN SOURCE: NORMAL
T4 FREE SERPL-MCNC: 1.03 NG/DL (ref 0.76–1.46)
TSH SERPL DL<=0.005 MIU/L-ACNC: 2.22 MU/L (ref 0.4–4)

## 2019-01-18 NOTE — RESULT ENCOUNTER NOTE
Dear Maggi,   Your test results are all back -   Your labs are all stable.  The thyroid tests are all normal.  Your creatinine or kidney function are elevated but are at baseline from one year ago.  I do not think this is what is causing your symptoms.  I suspect a viral etiology.  If your symptoms persist, please schedule with Dr. Thomas to follow-up.  Let us know if you have any questions.  -Joseline Whelan, DO

## 2019-01-28 DIAGNOSIS — F33.0 MILD RECURRENT MAJOR DEPRESSION (H): ICD-10-CM

## 2019-01-28 DIAGNOSIS — I10 BENIGN ESSENTIAL HYPERTENSION: ICD-10-CM

## 2019-01-28 NOTE — TELEPHONE ENCOUNTER
"1 month Rx's recently sent  Due for physical  Sent MyChart to pt  Jacinta CONTRERAS RN    Requested Prescriptions   Pending Prescriptions Disp Refills     FLUoxetine (PROZAC) 20 MG capsule 180 capsule 0     Sig: 3 CAP PO QD (Once per day) in AM.    SSRIs Protocol Failed - 1/28/2019 10:05 AM       Failed - PHQ-9 score less than 5 in past 6 months    Please review last PHQ-9 score.          Passed - Medication is active on med list       Passed - Patient is age 18 or older       Passed - No active pregnancy on record       Passed - No positive pregnancy test in last 12 months       Passed - Recent (6 mo) or future (30 days) visit within the authorizing provider's specialty    Patient had office visit in the last 6 months or has a visit in the next 30 days with authorizing provider or within the authorizing provider's specialty.  See \"Patient Info\" tab in inbasket, or \"Choose Columns\" in Meds & Orders section of the refill encounter.            lisinopril (PRINIVIL/ZESTRIL) 10 MG tablet 30 tablet 0     Sig: Take 1 tablet (10 mg) by mouth daily    ACE Inhibitors (Including Combos) Protocol Failed - 1/28/2019 10:05 AM       Failed - Normal serum creatinine on file in past 12 months    Recent Labs   Lab Test 01/16/19  1422   CR 1.29*            Passed - Blood pressure under 140/90 in past 12 months    BP Readings from Last 3 Encounters:   01/16/19 110/64   03/06/18 119/71   10/31/17 108/65                Passed - Recent (12 mo) or future (30 days) visit within the authorizing provider's specialty    Patient had office visit in the last 12 months or has a visit in the next 30 days with authorizing provider or within the authorizing provider's specialty.  See \"Patient Info\" tab in inbasket, or \"Choose Columns\" in Meds & Orders section of the refill encounter.             Passed - Medication is active on med list       Passed - Patient is age 18 or older       Passed - No active pregnancy on record       Passed - Normal serum " potassium on file in past 12 months    Recent Labs   Lab Test 01/16/19  1422   POTASSIUM 4.4            Passed - No positive pregnancy test within past 12 months

## 2019-02-05 RX ORDER — LISINOPRIL 10 MG/1
10 TABLET ORAL DAILY
Qty: 30 TABLET | Refills: 0 | Status: SHIPPED | OUTPATIENT
Start: 2019-02-05 | End: 2019-02-08

## 2019-02-08 ENCOUNTER — OFFICE VISIT (OUTPATIENT)
Dept: FAMILY MEDICINE | Facility: CLINIC | Age: 64
End: 2019-02-08
Payer: COMMERCIAL

## 2019-02-08 VITALS
DIASTOLIC BLOOD PRESSURE: 81 MMHG | WEIGHT: 185.5 LBS | BODY MASS INDEX: 27.47 KG/M2 | TEMPERATURE: 98 F | HEIGHT: 69 IN | SYSTOLIC BLOOD PRESSURE: 139 MMHG | OXYGEN SATURATION: 97 % | HEART RATE: 77 BPM

## 2019-02-08 DIAGNOSIS — N64.4 BREAST PAIN: ICD-10-CM

## 2019-02-08 DIAGNOSIS — I10 BENIGN ESSENTIAL HYPERTENSION: ICD-10-CM

## 2019-02-08 DIAGNOSIS — Z00.00 ENCOUNTER FOR ROUTINE ADULT HEALTH EXAMINATION WITHOUT ABNORMAL FINDINGS: Primary | ICD-10-CM

## 2019-02-08 DIAGNOSIS — J40 BRONCHITIS: ICD-10-CM

## 2019-02-08 DIAGNOSIS — F33.0 MILD RECURRENT MAJOR DEPRESSION (H): ICD-10-CM

## 2019-02-08 PROCEDURE — G0145 SCR C/V CYTO,THINLAYER,RESCR: HCPCS | Performed by: FAMILY MEDICINE

## 2019-02-08 PROCEDURE — 87624 HPV HI-RISK TYP POOLED RSLT: CPT | Performed by: FAMILY MEDICINE

## 2019-02-08 PROCEDURE — 99214 OFFICE O/P EST MOD 30 MIN: CPT | Mod: 25 | Performed by: FAMILY MEDICINE

## 2019-02-08 PROCEDURE — 99396 PREV VISIT EST AGE 40-64: CPT | Performed by: FAMILY MEDICINE

## 2019-02-08 RX ORDER — LISINOPRIL 10 MG/1
10 TABLET ORAL DAILY
Qty: 90 TABLET | Refills: 1 | Status: SHIPPED | OUTPATIENT
Start: 2019-02-08 | End: 2019-09-04

## 2019-02-08 RX ORDER — ALBUTEROL SULFATE 90 UG/1
2 AEROSOL, METERED RESPIRATORY (INHALATION) EVERY 6 HOURS PRN
Qty: 1 INHALER | Refills: 3 | Status: SHIPPED | OUTPATIENT
Start: 2019-02-08 | End: 2020-12-30

## 2019-02-08 ASSESSMENT — PATIENT HEALTH QUESTIONNAIRE - PHQ9: SUM OF ALL RESPONSES TO PHQ QUESTIONS 1-9: 1

## 2019-02-08 ASSESSMENT — MIFFLIN-ST. JEOR: SCORE: 1447.86

## 2019-02-08 NOTE — PROGRESS NOTES
SUBJECTIVE:   CC: Maggi Reynolds is an 63 year old woman who presents for preventive health visit.     Physical   Annual:     Getting at least 3 servings of Calcium per day:  Yes    Bi-annual eye exam:  Yes    Dental care twice a year:  Yes    Sleep apnea or symptoms of sleep apnea:  None    Diet:  Regular (no restrictions)    Frequency of exercise:  4-5 days/week    Duration of exercise:  30-45 minutes    Taking medications regularly:  Yes    Medication side effects:  None    Additional concerns today:  Yes    PHQ-2 Total Score: 0          1) HTN, she ran out of the lisinopril , so BP is borderline but otherwise when she takes it daily , it is usually well maintained in the 120s to 130 over 70s    2) depression - she is on 60 mg of Prozac daily and seems to be controlling her depressive symptoms - PHQ 9 score is 1 , she is doing exercising a few x a week too , vit D supplements and working well for her. She needs refills, takes them as three capsules of the 20mg , goes down to 40 mg in the summer.  3) breast pain, in the left breast last week a couple of days with tenderness just next to the nipple and some leakage form the nipple of yellow liquid, she has had that yrs ago when she had to have a ductal cyst I and d , which helped at the time.no redness and no palpable lump now .     Today's PHQ-2 Score:   PHQ-2 ( 1999 [employer]) 2/8/2019   Q1: Little interest or pleasure in doing things 0   Q2: Feeling down, depressed or hopeless 0   PHQ-2 Score 0   Q1: Little interest or pleasure in doing things Not at all   Q2: Feeling down, depressed or hopeless Not at all   PHQ-2 Score 0       Abuse: Current or Past(Physical, Sexual or Emotional)- No  Do you feel safe in your environment? Yes    Social History     Tobacco Use     Smoking status: Never Smoker     Smokeless tobacco: Never Used   Substance Use Topics     Alcohol use: Yes     Alcohol/week: 0.0 oz     Comment: wine sometimes     Alcohol Use 2/8/2019   If  you drink alcohol do you typically have greater than 3 drinks per day OR greater than 7 drinks per week? No   No flowsheet data found.    Reviewed orders with patient.  Reviewed health maintenance and updated orders accordingly - Yes  Labs reviewed in EPIC  BP Readings from Last 3 Encounters:   02/08/19 139/81   01/16/19 110/64   03/06/18 119/71    Wt Readings from Last 3 Encounters:   02/08/19 84.1 kg (185 lb 8 oz)   01/16/19 82.3 kg (181 lb 8 oz)   03/06/18 78.5 kg (173 lb)                  Patient Active Problem List   Diagnosis     Disorder of bone and cartilage     Disease of lung     Mild recurrent major depression (H)     CARDIOVASCULAR SCREENING; LDL GOAL LESS THAN 160     Hypertension goal BP (blood pressure) < 140/90     Advanced directives, counseling/discussion     Discharge from left nipple     Cervical lymphadenopathy     Past Surgical History:   Procedure Laterality Date     DILATION AND CURETTAGE  8/21/2013    Procedure: DILATION AND CURETTAGE;;  Surgeon: Tamar Walsh MD;  Location:  OR     LAPAROSCOPIC SALPINGO-OOPHORECTOMY  8/21/2013    Procedure: LAPAROSCOPIC SALPINGO-OOPHORECTOMY;  Laparoscopic Bilateral Salpingo-Oophorectomy, Pelvic washings, Dilation and Curettage;  Surgeon: Tamar Walsh MD;  Location:  OR     LUMPECTOMY BREAST Bilateral 5/31/2016    Procedure: LUMPECTOMY BREAST;  Surgeon: Barney Givens MD;  Location:  OR       Social History     Tobacco Use     Smoking status: Never Smoker     Smokeless tobacco: Never Used   Substance Use Topics     Alcohol use: Yes     Alcohol/week: 0.0 oz     Comment: wine sometimes     Family History   Problem Relation Age of Onset     Arthritis Mother      Osteoporosis Mother         at age 75     Heart Disease Mother         arhythmia     Allergies Mother         seasonal     Alzheimer Disease Mother         at age 81     C.A.D. Father         bypass at age 78 and Pace maker at age 90     Diabetes Father         type 2      Heart Disease Father      Gastrointestinal Disease Father         ulcers     Breast Cancer Maternal Grandmother         at age 40's     Cerebrovascular Disease Maternal Grandfather         at age 70's     C.A.D. Paternal Grandmother         congestive heart failure     Respiratory Brother         bronchiestis         Current Outpatient Medications   Medication Sig Dispense Refill     albuterol (PROAIR HFA/PROVENTIL HFA/VENTOLIN HFA) 108 (90 Base) MCG/ACT inhaler Inhale 2 puffs into the lungs every 6 hours as needed for shortness of breath / dyspnea or wheezing 1 Inhaler 3     ASPIRIN 325 MG OR TABS 1 tab in am qs prn     FLUoxetine (PROZAC) 20 MG capsule 3 CAP PO QD (Once per day) in AM. 270 capsule 1     lisinopril (PRINIVIL/ZESTRIL) 10 MG tablet Take 1 tablet (10 mg) by mouth daily 90 tablet 1     REFRESH 1 % OP SOLN as needed 1 0     lisinopril (PRINIVIL/ZESTRIL) 20 MG tablet Take 1 tablet (20 mg) by mouth daily 30 tablet 0     methylphenidate (RITALIN) 10 MG tablet Take 10 mg by mouth 2 times daily. 1.5 in AM, 1.5 NOON, 20mg in PM if needed.       Allergies   Allergen Reactions     No Known Allergies      Recent Labs   Lab Test 01/16/19  1422 10/31/17  1025 03/31/16  1152 05/08/15  1141   A1C  --  5.2  --   --    LDL  --  85 128*  --    HDL  --  111 135  --    TRIG  --  56 59  --    ALT  --  47  --  30   CR 1.29* 1.25*  --  1.05*   GFRESTIMATED 44* 43*  --  53*   GFRESTBLACK 50* 52*  --  64   POTASSIUM 4.4 4.6  --  4.7   TSH 2.22  --  1.79  --         Mammogram Screening: Patient over age 50, mutual decision to screen reflected in health maintenance.    Pertinent mammograms are reviewed under the imaging tab.  History of abnormal Pap smear: NO - age 30-65 PAP every 5 years with negative HPV co-testing recommended  PAP / HPV Latest Ref Rng & Units 3/31/2016 5/21/2013 11/1/2010   PAP - NIL NIL NIL   HPV 16 DNA NEG Negative - -   HPV 18 DNA NEG Negative - -   OTHER HR HPV NEG Negative - -     Reviewed and updated  as needed this visit by clinical staff         Reviewed and updated as needed this visit by Provider        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     Other specified viral warts      Ovarian cyst      Unspecified essential hypertension      Unspecified symptom associated with female genital organs      Urinary tract infection, site not specified       Past Surgical History:   Procedure Laterality Date     DILATION AND CURETTAGE  8/21/2013    Procedure: DILATION AND CURETTAGE;;  Surgeon: Tamar Walsh MD;  Location: [facility] OR     LAPAROSCOPIC SALPINGO-OOPHORECTOMY  8/21/2013    Procedure: LAPAROSCOPIC SALPINGO-OOPHORECTOMY;  Laparoscopic Bilateral Salpingo-Oophorectomy, Pelvic washings, Dilation and Curettage;  Surgeon: Tamar Walsh MD;  Location: [facility] OR     LUMPECTOMY BREAST Bilateral 5/31/2016    Procedure: LUMPECTOMY BREAST;  Surgeon: Barney Givens MD;  Location:  OR       Review of Systems  CONSTITUTIONAL: NEGATIVE for fever, chills, change in weight  INTEGUMENTARY/SKIN: NEGATIVE for worrisome rashes, moles or lesions  EYES: NEGATIVE for vision changes or irritation  ENT: NEGATIVE for ear, mouth and throat problems  RESP: NEGATIVE for significant cough or SOB  BREAST: NEGATIVE for masses, tenderness or discharge  CV: NEGATIVE for chest pain, palpitations or peripheral edema  GI: NEGATIVE for nausea, abdominal pain, heartburn, or change in bowel habits  : NEGATIVE for unusual urinary or vaginal symptoms. No vaginal bleeding.  MUSCULOSKELETAL: NEGATIVE for significant arthralgias or myalgia  NEURO: NEGATIVE for weakness, dizziness or paresthesias  PSYCHIATRIC: NEGATIVE for changes in mood or affect       OBJECTIVE:   LMP 08/15/2010   Physical Exam  GENERAL APPEARANCE: healthy, alert and no distress  EYES: Eyes grossly normal to inspection, PERRL and conjunctivae and sclerae normal  HENT: ear canals and TM's normal, nose and mouth without ulcers or lesions, oropharynx clear and oral mucous membranes moist  NECK: no adenopathy, no asymmetry, masses, or scars and thyroid normal to palpation  RESP: lungs clear to auscultation - no rales, rhonchi or wheezes  BREAST: normal without masses, tenderness or nipple discharge and no palpable axillary masses or adenopathy  CV: regular rate and rhythm, normal S1 S2, no S3 or S4, no murmur, click or rub, no peripheral edema and peripheral pulses strong  ABDOMEN: soft, nontender, no hepatosplenomegaly, no masses and bowel sounds normal   (female): normal female external genitalia, normal urethral meatus, vaginal mucosal atrophy noted, normal cervix, adnexae, and uterus without masses or abnormal discharge  MS: no musculoskeletal defects are noted and gait is age appropriate without ataxia  SKIN: no suspicious lesions or rashes  NEURO: Normal strength and tone, sensory exam grossly normal, mentation intact and speech normal  PSYCH: mentation appears normal and affect normal/bright    Diagnostic Test Results:  Results for orders placed or performed in visit on 01/16/19   TSH   Result Value Ref Range    TSH 2.22 0.40 - 4.00 mU/L   T3, Free   Result Value Ref Range    Free T3 2.7 2.3 - 4.2 pg/mL   T4, free   Result Value Ref Range    T4 Free 1.03 0.76 - 1.46 ng/dL   CBC with platelets   Result Value Ref Range    WBC 6.7 4.0 - 11.0 10e9/L    RBC Count 4.04 3.8 - 5.2 10e12/L    Hemoglobin 12.1 11.7 - 15.7 g/dL    Hematocrit 37.3 35.0 - 47.0 %    MCV 92 78 - 100 fl    MCH 30.0 26.5 - 33.0 pg    MCHC 32.4 31.5 - 36.5 g/dL    RDW 13.0 10.0 - 15.0 %    Platelet Count 258 150 - 450 10e9/L   Mononucleosis screen   Result Value Ref Range    Mononucleosis Screen Negative NEG^Negative    Basic metabolic panel  (Ca, Cl, CO2, Creat, Gluc, K, Na, BUN)   Result Value Ref Range    Sodium 137 133 - 144 mmol/L    Potassium 4.4 3.4 - 5.3 mmol/L    Chloride 104 94 - 109 mmol/L    Carbon Dioxide 27 20 - 32 mmol/L    Anion Gap 6 3 - 14 mmol/L    Glucose 82 70 - 99 mg/dL    Urea Nitrogen 24 7 - 30 mg/dL    Creatinine 1.29 (H) 0.52 - 1.04 mg/dL    GFR Estimate 44 (L) >60 mL/min/[1.73_m2]    GFR Estimate If Black 50 (L) >60 mL/min/[1.73_m2]    Calcium 9.5 8.5 - 10.1 mg/dL   Strep, Rapid Screen   Result Value Ref Range    Specimen Description Throat     Rapid Strep A Screen       NEGATIVE: No Group A streptococcal antigen detected by immunoassay, await culture report.   Beta strep group A culture   Result Value Ref Range    Specimen Description Throat     Culture Micro No beta hemolytic Streptococcus Group A isolated        ASSESSMENT/PLAN:   1. Encounter for routine adult health examination without abnormal findings  Discussed diet,calcium,exercise.Went over self breast exam.Thin prep was done.Eyes and teeth UTD.No immunizations needed today.See orders below for tests ordered and screening needed.    - Lipid Profile (Chol, Trig, HDL, LDL calc); Future  - Glucose; Future  - Pap imaged thin layer screen with HPV - recommended age 30 - 65 years (select HPV order below)    2. Mild recurrent major depression (H)  We discussed the pros and cons with the medication and she is aware , she wants to continue as it has been controlling her depression well . No side effects   - FLUoxetine (PROZAC) 20 MG capsule; 3 CAP PO QD (Once per day) in AM.  Dispense: 270 capsule; Refill: 1    3. Benign essential hypertension  Refilled , doing well, 10mg working and she does not want to increase the dose , no side effects .  - lisinopril (PRINIVIL/ZESTRIL) 10 MG tablet; Take 1 tablet (10 mg) by mouth daily  Dispense: 90 tablet; Refill: 1    4. Bronchitis  Refilled , uses rarely when she gets bronchitis, no asthma.  - albuterol (PROAIR  "HFA/PROVENTIL HFA/VENTOLIN HFA) 108 (90 Base) MCG/ACT inhaler; Inhale 2 puffs into the lungs every 6 hours as needed for shortness of breath / dyspnea or wheezing  Dispense: 1 Inhaler; Refill: 3    5. Breast pain  We discussed doing the diagnostic not screening mammogram and also US on the left breast as she has nipple discharge .   - MA Diagnostic Digital Bilateral; Future    COUNSELING:  Reviewed preventive health counseling, as reflected in patient instructions       Regular exercise       Healthy diet/nutrition       Vision screening       Hearing screening       Osteoporosis Prevention/Bone Health    BP Readings from Last 1 Encounters:   01/16/19 110/64     Estimated body mass index is 27.2 kg/m  as calculated from the following:    Height as of 1/16/19: 1.74 m (5' 8.5\").    Weight as of 1/16/19: 82.3 kg (181 lb 8 oz).           reports that  has never smoked. she has never used smokeless tobacco.      Counseling Resources:  ATP IV Guidelines  Pooled Cohorts Equation Calculator  Breast Cancer Risk Calculator  FRAX Risk Assessment  ICSI Preventive Guidelines  Dietary Guidelines for Americans, 2010  USDA's MyPlate  ASA Prophylaxis  Lung CA Screening    Sara Thomas MD  [facility]  "

## 2019-02-08 NOTE — NURSING NOTE
"Chief Complaint   Patient presents with     Physical     /84   Pulse 77   Temp 98  F (36.7  C) (Oral)   Ht 1.74 m (5' 8.5\")   Wt 84.1 kg (185 lb 8 oz)   LMP 08/15/2010   SpO2 97%   BMI 27.79 kg/m   Estimated body mass index is 27.79 kg/m  as calculated from the following:    Height as of this encounter: 1.74 m (5' 8.5\").    Weight as of this encounter: 84.1 kg (185 lb 8 oz).  Medication Reconciliation: complete      Health Maintenance that is potentially due pending provider review:  PHQ9    Possibly completing today per provider review.    ABIGAIL Byrd  "

## 2019-02-08 NOTE — PATIENT INSTRUCTIONS

## 2019-02-13 LAB
COPATH REPORT: NORMAL
PAP: NORMAL

## 2019-02-15 LAB
FINAL DIAGNOSIS: NORMAL
HPV HR 12 DNA CVX QL NAA+PROBE: NEGATIVE
HPV16 DNA SPEC QL NAA+PROBE: NEGATIVE
HPV18 DNA SPEC QL NAA+PROBE: NEGATIVE
SPECIMEN DESCRIPTION: NORMAL
SPECIMEN SOURCE CVX/VAG CYTO: NORMAL

## 2019-02-21 ENCOUNTER — ANCILLARY PROCEDURE (OUTPATIENT)
Dept: MAMMOGRAPHY | Facility: CLINIC | Age: 64
End: 2019-02-21
Payer: COMMERCIAL

## 2019-02-21 DIAGNOSIS — N64.4 BREAST PAIN: ICD-10-CM

## 2019-02-21 DIAGNOSIS — N64.52 NIPPLE DISCHARGE: ICD-10-CM

## 2019-03-05 DIAGNOSIS — I10 BENIGN ESSENTIAL HYPERTENSION: ICD-10-CM

## 2019-03-05 DIAGNOSIS — F33.0 MILD RECURRENT MAJOR DEPRESSION (H): ICD-10-CM

## 2019-03-05 RX ORDER — LISINOPRIL 10 MG/1
10 TABLET ORAL DAILY
Qty: 90 TABLET | Refills: 1 | Status: CANCELLED | OUTPATIENT
Start: 2019-03-05

## 2019-03-11 NOTE — TELEPHONE ENCOUNTER
"Both too early  Prozac sent 2/10/2019 for 6 months  Lisinopril sent 2/8/2019 for 6 months  Jacinta CONTRERAS RN    Requested Prescriptions   Pending Prescriptions Disp Refills     FLUoxetine (PROZAC) 20 MG capsule 270 capsule 1     Sig: 3 CAP PO QD (Once per day) in AM.    SSRIs Protocol Passed - 3/10/2019  9:22 AM       Passed - PHQ-9 score less than 5 in past 6 months    Please review last PHQ-9 score.          Passed - Medication is active on med list       Passed - Patient is age 18 or older       Passed - No active pregnancy on record       Passed - No positive pregnancy test in last 12 months       Passed - Recent (6 mo) or future (30 days) visit within the authorizing provider's specialty    Patient had office visit in the last 6 months or has a visit in the next 30 days with authorizing provider or within the authorizing provider's specialty.  See \"Patient Info\" tab in inbasket, or \"Choose Columns\" in Meds & Orders section of the refill encounter.            lisinopril (PRINIVIL/ZESTRIL) 10 MG tablet 90 tablet 1     Sig: Take 1 tablet (10 mg) by mouth daily    ACE Inhibitors (Including Combos) Protocol Failed - 3/10/2019  9:22 AM       Failed - Normal serum creatinine on file in past 12 months    Recent Labs   Lab Test 01/16/19  1422   CR 1.29*            Passed - Blood pressure under 140/90 in past 12 months    BP Readings from Last 3 Encounters:   02/08/19 139/81   01/16/19 110/64   03/06/18 119/71                Passed - Recent (12 mo) or future (30 days) visit within the authorizing provider's specialty    Patient had office visit in the last 12 months or has a visit in the next 30 days with authorizing provider or within the authorizing provider's specialty.  See \"Patient Info\" tab in inbasket, or \"Choose Columns\" in Meds & Orders section of the refill encounter.             Passed - Medication is active on med list       Passed - Patient is age 18 or older       Passed - No active pregnancy on record       " Passed - Normal serum potassium on file in past 12 months    Recent Labs   Lab Test 01/16/19  1422   POTASSIUM 4.4            Passed - No positive pregnancy test within past 12 months

## 2019-09-04 DIAGNOSIS — F33.0 MILD RECURRENT MAJOR DEPRESSION (H): ICD-10-CM

## 2019-09-04 DIAGNOSIS — I10 BENIGN ESSENTIAL HYPERTENSION: ICD-10-CM

## 2019-09-04 RX ORDER — LISINOPRIL 10 MG/1
TABLET ORAL
Qty: 30 TABLET | Refills: 0 | Status: SHIPPED | OUTPATIENT
Start: 2019-09-04 | End: 2019-09-10

## 2019-09-04 NOTE — TELEPHONE ENCOUNTER
"Lisinopril:  Medication is being filled for 1 time refill only due to:  Patient needs to be seen because due for BP follow up.   Note from 2/8/19 OV:   Return in about 6 months (around 8/8/2019) for BP Recheck   [employer] message sent to patient asking her to schedule an appointment.                     PHQ9 sent via [employer] too  Carolyn Peña RN     Requested Prescriptions   Pending Prescriptions Disp Refills     FLUoxetine (PROZAC) 20 MG capsule [Pharmacy Med Name: FLUOXETINE HCL 20 MG CAPSULE] 180 capsule 0     Sig: TAKE 3 CAPSULES BY MOUTH IN THE MORNING       SSRIs Protocol Failed - 9/4/2019  1:14 AM        Failed - PHQ-9 score less than 5 in past 6 months     Please review last PHQ-9 score.           Failed - Recent (6 mo) or future (30 days) visit within the authorizing provider's specialty     Patient had office visit in the last 6 months or has a visit in the next 30 days with authorizing provider or within the authorizing provider's specialty.  See \"Patient Info\" tab in inbasket, or \"Choose Columns\" in Meds & Orders section of the refill encounter.            Passed - Medication is active on med list        Passed - Patient is age 18 or older        Passed - No active pregnancy on record        Passed - No positive pregnancy test in last 12 months        lisinopril (PRINIVIL/ZESTRIL) 10 MG tablet [Pharmacy Med Name: LISINOPRIL 10 MG TABLET] 60 tablet 0     Sig: TAKE 1 TABLET BY MOUTH EVERY DAY       ACE Inhibitors (Including Combos) Protocol Failed - 9/4/2019  1:14 AM        Failed - Normal serum creatinine on file in past 12 months     Recent Labs   Lab Test 01/16/19  1422   CR 1.29*             Passed - Blood pressure under 140/90 in past 12 months     BP Readings from Last 3 Encounters:   02/08/19 139/81   01/16/19 110/64   03/06/18 119/71                 Passed - Recent (12 mo) or future (30 days) visit within the authorizing provider's specialty     Patient had office visit in the last 12 months or has " "a visit in the next 30 days with authorizing provider or within the authorizing provider's specialty.  See \"Patient Info\" tab in inbasket, or \"Choose Columns\" in Meds & Orders section of the refill encounter.              Passed - Medication is active on med list        Passed - Patient is age 18 or older        Passed - No active pregnancy on record        Passed - Normal serum potassium on file in past 12 months     Recent Labs   Lab Test 01/16/19  1422   POTASSIUM 4.4             Passed - No positive pregnancy test within past 12 months        "

## 2019-09-06 NOTE — TELEPHONE ENCOUNTER
Prescription approved per [facility] Refill Protocol.  Jacinta CONTRERAS RN

## 2019-09-10 ENCOUNTER — OFFICE VISIT (OUTPATIENT)
Dept: FAMILY MEDICINE | Facility: CLINIC | Age: 64
End: 2019-09-10
Payer: MEDICARE

## 2019-09-10 VITALS
HEART RATE: 88 BPM | OXYGEN SATURATION: 98 % | HEIGHT: 69 IN | SYSTOLIC BLOOD PRESSURE: 95 MMHG | WEIGHT: 170.7 LBS | DIASTOLIC BLOOD PRESSURE: 61 MMHG | TEMPERATURE: 98.2 F | BODY MASS INDEX: 25.28 KG/M2

## 2019-09-10 DIAGNOSIS — I10 BENIGN ESSENTIAL HYPERTENSION: ICD-10-CM

## 2019-09-10 DIAGNOSIS — F33.0 MILD RECURRENT MAJOR DEPRESSION (H): Primary | ICD-10-CM

## 2019-09-10 DIAGNOSIS — N18.30 CKD (CHRONIC KIDNEY DISEASE) STAGE 3, GFR 30-59 ML/MIN (H): ICD-10-CM

## 2019-09-10 DIAGNOSIS — Z23 NEED FOR PROPHYLACTIC VACCINATION AND INOCULATION AGAINST INFLUENZA: ICD-10-CM

## 2019-09-10 DIAGNOSIS — Z23 ENCOUNTER FOR IMMUNIZATION: ICD-10-CM

## 2019-09-10 DIAGNOSIS — R79.89 ELEVATED SERUM CREATININE: ICD-10-CM

## 2019-09-10 PROBLEM — N18.4 CKD (CHRONIC KIDNEY DISEASE) STAGE 4, GFR 15-29 ML/MIN (H): Status: ACTIVE | Noted: 2019-09-10

## 2019-09-10 PROBLEM — N18.5 CKD (CHRONIC KIDNEY DISEASE) STAGE 5, GFR LESS THAN 15 ML/MIN (H): Status: ACTIVE | Noted: 2019-09-10

## 2019-09-10 PROCEDURE — 90472 IMMUNIZATION ADMIN EACH ADD: CPT | Performed by: FAMILY MEDICINE

## 2019-09-10 PROCEDURE — 36415 COLL VENOUS BLD VENIPUNCTURE: CPT | Performed by: FAMILY MEDICINE

## 2019-09-10 PROCEDURE — 99214 OFFICE O/P EST MOD 30 MIN: CPT | Mod: 25 | Performed by: FAMILY MEDICINE

## 2019-09-10 PROCEDURE — 90670 PCV13 VACCINE IM: CPT | Performed by: FAMILY MEDICINE

## 2019-09-10 PROCEDURE — 80061 LIPID PANEL: CPT | Performed by: FAMILY MEDICINE

## 2019-09-10 PROCEDURE — 90471 IMMUNIZATION ADMIN: CPT | Performed by: FAMILY MEDICINE

## 2019-09-10 PROCEDURE — 80053 COMPREHEN METABOLIC PANEL: CPT | Performed by: FAMILY MEDICINE

## 2019-09-10 PROCEDURE — 90662 IIV NO PRSV INCREASED AG IM: CPT | Performed by: FAMILY MEDICINE

## 2019-09-10 RX ORDER — LISINOPRIL 10 MG/1
10 TABLET ORAL DAILY
Qty: 90 TABLET | Refills: 1 | Status: SHIPPED | OUTPATIENT
Start: 2019-09-10 | End: 2020-01-10

## 2019-09-10 ASSESSMENT — PATIENT HEALTH QUESTIONNAIRE - PHQ9: SUM OF ALL RESPONSES TO PHQ QUESTIONS 1-9: 0

## 2019-09-10 ASSESSMENT — MIFFLIN-ST. JEOR: SCORE: 1375.73

## 2019-09-10 NOTE — PROGRESS NOTES
Subjective     Maggi Reynolds is a 64 year old female who presents to clinic today for the following health issues:    HPI   1) Recheck/refill medication for HTN , she is on lisinopril 10mg daily and seems to control her BP well , no side effects , her BP is on the low side today but she is also fasting for lipids check .  2) depression, she has been on the Prozac 20 mg for a few yrs and now is feeling great because she just retired this year, she is not as stressed out as she used to be , spends more time outside gardening , reading books etc .        Patient Active Problem List   Diagnosis     Disorder of bone and cartilage     Disease of lung     Mild recurrent major depression (H)     CARDIOVASCULAR SCREENING; LDL GOAL LESS THAN 160     Hypertension goal BP (blood pressure) < 140/90     Advanced directives, counseling/discussion     Discharge from left nipple     Cervical lymphadenopathy     CKD (chronic kidney disease) stage 3, GFR 30-59 ml/min (H)     CKD (chronic kidney disease) stage 4, GFR 15-29 ml/min (H)     CKD (chronic kidney disease) stage 5, GFR less than 15 ml/min (H)     Elevated serum creatinine     Benign essential hypertension     Past Surgical History:   Procedure Laterality Date     DILATION AND CURETTAGE  8/21/2013    Procedure: DILATION AND CURETTAGE;;  Surgeon: Tamar Walsh MD;  Location:  OR     LAPAROSCOPIC SALPINGO-OOPHORECTOMY  8/21/2013    Procedure: LAPAROSCOPIC SALPINGO-OOPHORECTOMY;  Laparoscopic Bilateral Salpingo-Oophorectomy, Pelvic washings, Dilation and Curettage;  Surgeon: Tamar Walsh MD;  Location:  OR     LUMPECTOMY BREAST Bilateral 5/31/2016    Procedure: LUMPECTOMY BREAST;  Surgeon: Barney Givens MD;  Location:  OR       Social History     Tobacco Use     Smoking status: Never Smoker     Smokeless tobacco: Never Used   Substance Use Topics     Alcohol use: Yes     Alcohol/week: 0.0 oz     Comment: 1 glass wine per month     Family  History   Problem Relation Age of Onset     Arthritis Mother      Osteoporosis Mother         at age 75     Heart Disease Mother         arhythmia     Allergies Mother         seasonal     Alzheimer Disease Mother         at age 81     C.A.D. Father         bypass at age 78 and Pace maker at age 90     Diabetes Father         type 2     Heart Disease Father      Gastrointestinal Disease Father         ulcers     Breast Cancer Maternal Grandmother         at age 40's     Cerebrovascular Disease Maternal Grandfather         at age 70's     C.A.D. Paternal Grandmother         congestive heart failure     Respiratory Brother         bronchiestis         Current Outpatient Medications   Medication Sig Dispense Refill     albuterol (PROAIR HFA/PROVENTIL HFA/VENTOLIN HFA) 108 (90 Base) MCG/ACT inhaler Inhale 2 puffs into the lungs every 6 hours as needed for shortness of breath / dyspnea or wheezing 1 Inhaler 3     ASPIRIN 325 MG OR TABS 1 tab in am qs prn     FLUoxetine (PROZAC) 20 MG capsule TAKE 3 CAPSULES BY MOUTH IN THE MORNING 270 capsule 1     lisinopril (PRINIVIL/ZESTRIL) 10 MG tablet Take 1 tablet (10 mg) by mouth daily 90 tablet 1     methylphenidate (RITALIN) 10 MG tablet Take 10 mg by mouth 2 times daily. 1.5 in AM, 1.5 NOON, 20mg in PM if needed.       REFRESH 1 % OP SOLN as needed 1 0     Allergies   Allergen Reactions     No Known Allergies      Recent Labs   Lab Test 09/10/19  1430 01/16/19  1422 10/31/17  1025 03/31/16  1152 05/08/15  1141   A1C  --   --  5.2  --   --    *  --  85 128*  --    HDL 95  --  111 135  --    TRIG 57  --  56 59  --    ALT 25  --  47  --  30   CR 1.09* 1.29* 1.25*  --  1.05*   GFRESTIMATED 53* 44* 43*  --  53*   GFRESTBLACK 62 50* 52*  --  64   POTASSIUM 4.4 4.4 4.6  --  4.7   TSH  --  2.22  --  1.79  --       BP Readings from Last 3 Encounters:   09/10/19 95/61   02/08/19 139/81   01/16/19 110/64    Wt Readings from Last 3 Encounters:   09/10/19 77.4 kg (170 lb 11.2 oz)  "  02/08/19 84.1 kg (185 lb 8 oz)   01/16/19 82.3 kg (181 lb 8 oz)                      Reviewed and updated as needed this visit by Provider         Review of Systems   ROS COMP: Constitutional, HEENT, cardiovascular, pulmonary, GI, , musculoskeletal, neuro, skin, endocrine and psych systems are negative, except as otherwise noted.      Objective    BP 95/61 (BP Location: Left arm, Cuff Size: Adult Large)   Pulse 88   Temp 98.2  F (36.8  C) (Oral)   Ht 1.74 m (5' 8.5\")   Wt 77.4 kg (170 lb 11.2 oz)   LMP 08/15/2010   SpO2 98%   BMI 25.58 kg/m    Body mass index is 25.58 kg/m .  Physical Exam   GENERAL: healthy, alert and no distress  EYES: Eyes grossly normal to inspection, PERRL and conjunctivae and sclerae normal  HENT: ear canals and TM's normal, nose and mouth without ulcers or lesions  NECK: no adenopathy, no asymmetry, masses, or scars and thyroid normal to palpation  RESP: lungs clear to auscultation - no rales, rhonchi or wheezes  CV: regular rate and rhythm, normal S1 S2, no S3 or S4, no murmur, click or rub, no peripheral edema and peripheral pulses strong  ABDOMEN: soft, nontender, no hepatosplenomegaly, no masses and bowel sounds normal  MS: no gross musculoskeletal defects noted, no edema  NEURO: Normal strength and tone, mentation intact and speech normal    Diagnostic Test Results:  Labs reviewed in Epic  Results for orders placed or performed in visit on 09/10/19   Comprehensive metabolic panel (BMP + Alb, Alk Phos, ALT, AST, Total. Bili, TP)   Result Value Ref Range    Sodium 138 133 - 144 mmol/L    Potassium 4.4 3.4 - 5.3 mmol/L    Chloride 105 94 - 109 mmol/L    Carbon Dioxide 27 20 - 32 mmol/L    Anion Gap 6 3 - 14 mmol/L    Glucose 85 70 - 99 mg/dL    Urea Nitrogen 25 7 - 30 mg/dL    Creatinine 1.09 (H) 0.52 - 1.04 mg/dL    GFR Estimate 53 (L) >60 mL/min/[1.73_m2]    GFR Estimate If Black 62 >60 mL/min/[1.73_m2]    Calcium 9.2 8.5 - 10.1 mg/dL    Bilirubin Total 0.3 0.2 - 1.3 mg/dL    " Albumin 3.7 3.4 - 5.0 g/dL    Protein Total 7.6 6.8 - 8.8 g/dL    Alkaline Phosphatase 84 40 - 150 U/L    ALT 25 0 - 50 U/L    AST 19 0 - 45 U/L   Lipid Profile (Chol, Trig, HDL, LDL calc)   Result Value Ref Range    Cholesterol 230 (H) <200 mg/dL    Triglycerides 57 <150 mg/dL    HDL Cholesterol 95 >49 mg/dL    LDL Cholesterol Calculated 124 (H) <100 mg/dL    Non HDL Cholesterol 135 (H) <130 mg/dL           Assessment & Plan     1. Mild recurrent major depression (H)  Will con  - FLUoxetine (PROZAC) 20 MG capsule; TAKE 3 CAPSULES BY MOUTH IN THE MORNING  Dispense: 270 capsule; Refill: 1    2. Benign essential hypertension  Will check labs and she will continue on the lisinopril but since she has had elevated creatinine and also low GFR of 50s , would need to see a nephrologist . Referral given .  - Comprehensive metabolic panel (BMP + Alb, Alk Phos, ALT, AST, Total. Bili, TP)  - Lipid Profile (Chol, Trig, HDL, LDL calc)  - lisinopril (PRINIVIL/ZESTRIL) 10 MG tablet; Take 1 tablet (10 mg) by mouth daily  Dispense: 90 tablet; Refill: 1  - NEPHROLOGY ADULT REFERRAL    3. CKD (chronic kidney disease) stage 3, GFR 30-59 ml/min (H)  Her creatinine today is better than last time but still elevated at 1.09 and GFR is 53 , see below     4. Elevated serum creatinine  Referred to see a nephrologist as she has had elevated creatinine on more than one occasion , rechecked today too .  - NEPHROLOGY ADULT REFERRAL    5. Encounter for immunization  Received her first pneumonia shot and also a flu shot   - Pneumococcal vaccine 13 valent PCV13 IM (Prevnar) [86688]  - ADMIN INFLUENZA VIRUS VAC    6. Need for prophylactic vaccination and inoculation against influenza  She got her flu shot today .  - ADMIN INFLUENZA (For MEDICARE Patients ONLY) []  - ADMIN MEDICARE: Pneumococcal Vaccine ()  - FLU VACCINE, INCREASED ANTIGEN, PRESV FREE  - ADMIN INFLUENZA (For MEDICARE Patients ONLY) []       Sara Thomas MD  [place]  [facility]

## 2019-09-11 LAB
ALBUMIN SERPL-MCNC: 3.7 G/DL (ref 3.4–5)
ALP SERPL-CCNC: 84 U/L (ref 40–150)
ALT SERPL W P-5'-P-CCNC: 25 U/L (ref 0–50)
ANION GAP SERPL CALCULATED.3IONS-SCNC: 6 MMOL/L (ref 3–14)
AST SERPL W P-5'-P-CCNC: 19 U/L (ref 0–45)
BILIRUB SERPL-MCNC: 0.3 MG/DL (ref 0.2–1.3)
BUN SERPL-MCNC: 25 MG/DL (ref 7–30)
CALCIUM SERPL-MCNC: 9.2 MG/DL (ref 8.5–10.1)
CHLORIDE SERPL-SCNC: 105 MMOL/L (ref 94–109)
CHOLEST SERPL-MCNC: 230 MG/DL
CO2 SERPL-SCNC: 27 MMOL/L (ref 20–32)
CREAT SERPL-MCNC: 1.09 MG/DL (ref 0.52–1.04)
GFR SERPL CREATININE-BSD FRML MDRD: 53 ML/MIN/{1.73_M2}
GLUCOSE SERPL-MCNC: 85 MG/DL (ref 70–99)
HDLC SERPL-MCNC: 95 MG/DL
LDLC SERPL CALC-MCNC: 124 MG/DL
NONHDLC SERPL-MCNC: 135 MG/DL
POTASSIUM SERPL-SCNC: 4.4 MMOL/L (ref 3.4–5.3)
PROT SERPL-MCNC: 7.6 G/DL (ref 6.8–8.8)
SODIUM SERPL-SCNC: 138 MMOL/L (ref 133–144)
TRIGL SERPL-MCNC: 57 MG/DL

## 2019-09-15 PROBLEM — I10 BENIGN ESSENTIAL HYPERTENSION: Status: ACTIVE | Noted: 2019-09-15

## 2019-09-15 PROBLEM — R79.89 ELEVATED SERUM CREATININE: Status: ACTIVE | Noted: 2019-09-15

## 2019-09-17 NOTE — TELEPHONE ENCOUNTER
RECORDS RECEIVED FROM: Internal   DATE RECEIVED: 11.27.19   NOTES STATUS DETAILS   OFFICE NOTE from referring provider Internal 9.10.19 Dr. Thomas   OFFICE NOTE from other specialist (nephrologists) N/A    DISCHARGE SUMMARY from hospital N/A    DISCHARGE REPORT from the ER N/A    MEDICATION LIST Internal    LABS     CBC Internal 1.16.19   CHEMISTRY LABS N/A    IRON STUDIES N/A    KIDNEY BIOPSY RESULTS N/A

## 2019-09-28 ENCOUNTER — HEALTH MAINTENANCE LETTER (OUTPATIENT)
Age: 64
End: 2019-09-28

## 2019-10-03 DIAGNOSIS — I10 BENIGN ESSENTIAL HYPERTENSION: ICD-10-CM

## 2019-10-03 RX ORDER — LISINOPRIL 10 MG/1
TABLET ORAL
Refills: 0
Start: 2019-10-03

## 2019-10-03 NOTE — TELEPHONE ENCOUNTER
"Last Written Prescription Date:  9/10/19  Last Fill Quantity: 90,  # refills: 1   Last office visit: 9/10/2019 with prescribing provider:  9/10/19   Future Office Visit:      Requested Prescriptions   Pending Prescriptions Disp Refills     lisinopril (PRINIVIL/ZESTRIL) 10 MG tablet [Pharmacy Med Name: LISINOPRIL 10 MG TABLET] 30 tablet 0     Sig: TAKE 1 TABLET BY MOUTH EVERY DAY       ACE Inhibitors (Including Combos) Protocol Failed - 10/3/2019  1:58 AM        Failed - Normal serum creatinine on file in past 12 months     Recent Labs   Lab Test 09/10/19  1430   CR 1.09*             Passed - Blood pressure under 140/90 in past 12 months     BP Readings from Last 3 Encounters:   09/10/19 95/61   02/08/19 139/81   01/16/19 110/64                 Passed - Recent (12 mo) or future (30 days) visit within the authorizing provider's specialty     Patient has had an office visit with the authorizing provider or a provider within the authorizing providers department within the previous 12 mos or has a future within next 30 days. See \"Patient Info\" tab in inbasket, or \"Choose Columns\" in Meds & Orders section of the refill encounter.              Passed - Medication is active on med list        Passed - Patient is age 18 or older        Passed - No active pregnancy on record        Passed - Normal serum potassium on file in past 12 months     Recent Labs   Lab Test 09/10/19  1430   POTASSIUM 4.4             Passed - No positive pregnancy test within past 12 months          "

## 2019-11-12 DIAGNOSIS — N18.30 CKD (CHRONIC KIDNEY DISEASE) STAGE 3, GFR 30-59 ML/MIN (H): Primary | ICD-10-CM

## 2019-11-19 DIAGNOSIS — I10 BENIGN ESSENTIAL HYPERTENSION: ICD-10-CM

## 2019-11-19 RX ORDER — LISINOPRIL 10 MG/1
TABLET ORAL
Start: 2019-11-19

## 2019-11-19 NOTE — TELEPHONE ENCOUNTER
"Last Written Prescription Date:  9/10/19  Last Fill Quantity: 90,  # refills: 1   Last office visit: 9/10/2019 with prescribing provider:  9/10/19   Future Office Visit:      Requested Prescriptions   Pending Prescriptions Disp Refills     lisinopril (PRINIVIL/ZESTRIL) 10 MG tablet [Pharmacy Med Name: LISINOPRIL 10 MG TABLET] 30 tablet 0     Sig: TAKE 1 TABLET BY MOUTH EVERY DAY       ACE Inhibitors (Including Combos) Protocol Failed - 11/19/2019 11:33 AM        Failed - Normal serum creatinine on file in past 12 months     Recent Labs   Lab Test 09/10/19  1430   CR 1.09*             Passed - Blood pressure under 140/90 in past 12 months     BP Readings from Last 3 Encounters:   09/10/19 95/61   02/08/19 139/81   01/16/19 110/64                 Passed - Recent (12 mo) or future (30 days) visit within the authorizing provider's specialty     Patient has had an office visit with the authorizing provider or a provider within the authorizing providers department within the previous 12 mos or has a future within next 30 days. See \"Patient Info\" tab in inbasket, or \"Choose Columns\" in Meds & Orders section of the refill encounter.              Passed - Medication is active on med list        Passed - Patient is age 18 or older        Passed - No active pregnancy on record        Passed - Normal serum potassium on file in past 12 months     Recent Labs   Lab Test 09/10/19  1430   POTASSIUM 4.4             Passed - No positive pregnancy test within past 12 months          "

## 2019-11-22 ENCOUNTER — TELEPHONE (OUTPATIENT)
Dept: NEPHROLOGY | Facility: CLINIC | Age: 64
End: 2019-11-22

## 2019-11-27 ENCOUNTER — OFFICE VISIT (OUTPATIENT)
Dept: NEPHROLOGY | Facility: CLINIC | Age: 64
End: 2019-11-27
Attending: FAMILY MEDICINE
Payer: MEDICARE

## 2019-11-27 ENCOUNTER — PRE VISIT (OUTPATIENT)
Dept: NEPHROLOGY | Facility: CLINIC | Age: 64
End: 2019-11-27

## 2019-11-27 ENCOUNTER — ANCILLARY PROCEDURE (OUTPATIENT)
Dept: ULTRASOUND IMAGING | Facility: CLINIC | Age: 64
End: 2019-11-27
Attending: INTERNAL MEDICINE
Payer: COMMERCIAL

## 2019-11-27 VITALS
TEMPERATURE: 98 F | HEART RATE: 72 BPM | DIASTOLIC BLOOD PRESSURE: 82 MMHG | OXYGEN SATURATION: 100 % | WEIGHT: 165.9 LBS | SYSTOLIC BLOOD PRESSURE: 135 MMHG | BODY MASS INDEX: 24.86 KG/M2

## 2019-11-27 DIAGNOSIS — I10 BENIGN ESSENTIAL HYPERTENSION: ICD-10-CM

## 2019-11-27 DIAGNOSIS — R79.89 ELEVATED SERUM CREATININE: ICD-10-CM

## 2019-11-27 DIAGNOSIS — D64.9 ANEMIA, UNSPECIFIED TYPE: Primary | ICD-10-CM

## 2019-11-27 DIAGNOSIS — N18.30 CKD (CHRONIC KIDNEY DISEASE) STAGE 3, GFR 30-59 ML/MIN (H): ICD-10-CM

## 2019-11-27 PROBLEM — N18.5 CKD (CHRONIC KIDNEY DISEASE) STAGE 5, GFR LESS THAN 15 ML/MIN (H): Status: RESOLVED | Noted: 2019-09-10 | Resolved: 2019-11-27

## 2019-11-27 PROBLEM — N18.4 CKD (CHRONIC KIDNEY DISEASE) STAGE 4, GFR 15-29 ML/MIN (H): Status: RESOLVED | Noted: 2019-09-10 | Resolved: 2019-11-27

## 2019-11-27 LAB
ALBUMIN SERPL-MCNC: 3.6 G/DL (ref 3.4–5)
ALBUMIN UR-MCNC: NEGATIVE MG/DL
ANION GAP SERPL CALCULATED.3IONS-SCNC: 5 MMOL/L (ref 3–14)
APPEARANCE UR: ABNORMAL
BILIRUB UR QL STRIP: NEGATIVE
BUN SERPL-MCNC: 29 MG/DL (ref 7–30)
CALCIUM SERPL-MCNC: 8.9 MG/DL (ref 8.5–10.1)
CHLORIDE SERPL-SCNC: 107 MMOL/L (ref 94–109)
CO2 SERPL-SCNC: 27 MMOL/L (ref 20–32)
COLOR UR AUTO: YELLOW
CREAT SERPL-MCNC: 1.63 MG/DL (ref 0.52–1.04)
CREAT UR-MCNC: 380 MG/DL
DEPRECATED CALCIDIOL+CALCIFEROL SERPL-MC: 23 UG/L (ref 20–75)
ERYTHROCYTE [DISTWIDTH] IN BLOOD BY AUTOMATED COUNT: 13.1 % (ref 10–15)
FERRITIN SERPL-MCNC: 108 NG/ML (ref 8–252)
GFR SERPL CREATININE-BSD FRML MDRD: 33 ML/MIN/{1.73_M2}
GLUCOSE SERPL-MCNC: 93 MG/DL (ref 70–99)
GLUCOSE UR STRIP-MCNC: NEGATIVE MG/DL
HCT VFR BLD AUTO: 34.2 % (ref 35–47)
HGB BLD-MCNC: 10.8 G/DL (ref 11.7–15.7)
HGB UR QL STRIP: NEGATIVE
HYALINE CASTS #/AREA URNS LPF: 31 /LPF (ref 0–2)
IRON SATN MFR SERPL: 18 % (ref 15–46)
IRON SERPL-MCNC: 47 UG/DL (ref 35–180)
KETONES UR STRIP-MCNC: 5 MG/DL
LEUKOCYTE ESTERASE UR QL STRIP: NEGATIVE
MCH RBC QN AUTO: 30.1 PG (ref 26.5–33)
MCHC RBC AUTO-ENTMCNC: 31.6 G/DL (ref 31.5–36.5)
MCV RBC AUTO: 95 FL (ref 78–100)
MUCOUS THREADS #/AREA URNS LPF: PRESENT /LPF
NITRATE UR QL: NEGATIVE
PH UR STRIP: 5 PH (ref 5–7)
PHOSPHATE SERPL-MCNC: 3.5 MG/DL (ref 2.5–4.5)
PLATELET # BLD AUTO: 222 10E9/L (ref 150–450)
POTASSIUM SERPL-SCNC: 4.5 MMOL/L (ref 3.4–5.3)
PROT UR-MCNC: 0.17 G/L
PROT/CREAT 24H UR: 0.04 G/G CR (ref 0–0.2)
PTH-INTACT SERPL-MCNC: 59 PG/ML (ref 18–80)
RBC # BLD AUTO: 3.59 10E12/L (ref 3.8–5.2)
RBC #/AREA URNS AUTO: 1 /HPF (ref 0–2)
SODIUM SERPL-SCNC: 138 MMOL/L (ref 133–144)
SOURCE: ABNORMAL
SP GR UR STRIP: 1.02 (ref 1–1.03)
SQUAMOUS #/AREA URNS AUTO: 1 /HPF (ref 0–1)
TIBC SERPL-MCNC: 259 UG/DL (ref 240–430)
UROBILINOGEN UR STRIP-MCNC: 0 MG/DL (ref 0–2)
WBC # BLD AUTO: 5.1 10E9/L (ref 4–11)
WBC #/AREA URNS AUTO: 2 /HPF (ref 0–5)

## 2019-11-27 PROCEDURE — G0463 HOSPITAL OUTPT CLINIC VISIT: HCPCS | Mod: ZF

## 2019-11-27 ASSESSMENT — PAIN SCALES - GENERAL: PAINLEVEL: NO PAIN (0)
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
## 2019-11-27 NOTE — NURSING NOTE
Chief Complaint   Patient presents with     Consult     Elevated Creatinine     Blood pressure 135/82, pulse 72, temperature 98  F (36.7  C), temperature source Oral, weight 75.3 kg (165 lb 14.4 oz), last menstrual period 08/15/2010, SpO2 100 %, not currently breastfeeding.    Padmini Hagen/DUNIA  November 27, 2019 9:23 AM
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
## 2019-11-27 NOTE — PATIENT INSTRUCTIONS
"Dear Maggi Reynolds      Your were seen in the [facility] for elevated creatinine level    Please get ultrasound to look for structural abnormality in kidney  Please get blood tests to check for \"multiple myeloma\"      Stop lisinopril  Push fluids - about 2 liters per day  Blood tests for kidney function on Monday 12/2/19      Please set up appointment with:  Siomara Salcedo MD April 15th 2020    Jessica Ngo NP within 4-6 weeks to follow up on lab work and BP    It was a pleasure meeting with you today. Thank you for allowing me and my team the privilege of caring for you today.  Please let us know if there is anything else we can do for you.    Ade Dhillon LPDEMETRIUS care coordinator - [phone]  Chyna Gonzalez, [phone]    Take care!  Siomara Salcedo MD  Department of Medicine  Division of Renal Diseases and Hypertension  [facility]    Email: [email or web address]             "

## 2019-12-02 DIAGNOSIS — D64.9 ANEMIA, UNSPECIFIED TYPE: ICD-10-CM

## 2019-12-02 DIAGNOSIS — R79.89 ELEVATED SERUM CREATININE: ICD-10-CM

## 2019-12-02 DIAGNOSIS — I10 BENIGN ESSENTIAL HYPERTENSION: ICD-10-CM

## 2019-12-02 LAB
ANION GAP SERPL CALCULATED.3IONS-SCNC: 6 MMOL/L (ref 3–14)
BUN SERPL-MCNC: 19 MG/DL (ref 7–30)
CALCIUM SERPL-MCNC: 9.2 MG/DL (ref 8.5–10.1)
CHLORIDE SERPL-SCNC: 102 MMOL/L (ref 94–109)
CO2 SERPL-SCNC: 27 MMOL/L (ref 20–32)
CREAT SERPL-MCNC: 1.27 MG/DL (ref 0.52–1.04)
GFR SERPL CREATININE-BSD FRML MDRD: 44 ML/MIN/{1.73_M2}
GLUCOSE SERPL-MCNC: 116 MG/DL (ref 70–99)
KAPPA LC UR-MCNC: 1.78 MG/DL (ref 0.33–1.94)
KAPPA LC/LAMBDA SER: 1.65 {RATIO} (ref 0.26–1.65)
LAMBDA LC SERPL-MCNC: 1.08 MG/DL (ref 0.57–2.63)
POTASSIUM SERPL-SCNC: 4.4 MMOL/L (ref 3.4–5.3)
SODIUM SERPL-SCNC: 135 MMOL/L (ref 133–144)

## 2019-12-03 LAB
ALBUMIN SERPL ELPH-MCNC: 4.3 G/DL (ref 3.7–5.1)
ALPHA1 GLOB SERPL ELPH-MCNC: 0.3 G/DL (ref 0.2–0.4)
ALPHA2 GLOB SERPL ELPH-MCNC: 0.8 G/DL (ref 0.5–0.9)
B-GLOBULIN SERPL ELPH-MCNC: 0.8 G/DL (ref 0.6–1)
GAMMA GLOB SERPL ELPH-MCNC: 1.2 G/DL (ref 0.7–1.6)
M PROTEIN SERPL ELPH-MCNC: 0 G/DL
PROT PATTERN SERPL ELPH-IMP: NORMAL

## 2020-01-03 DIAGNOSIS — R79.89 ELEVATED SERUM CREATININE: ICD-10-CM

## 2020-01-03 DIAGNOSIS — N18.30 CKD (CHRONIC KIDNEY DISEASE) STAGE 3, GFR 30-59 ML/MIN (H): Primary | ICD-10-CM

## 2020-01-09 ENCOUNTER — TELEPHONE (OUTPATIENT)
Dept: NEPHROLOGY | Facility: CLINIC | Age: 65
End: 2020-01-09

## 2020-01-10 ENCOUNTER — OFFICE VISIT (OUTPATIENT)
Dept: NEPHROLOGY | Facility: CLINIC | Age: 65
End: 2020-01-10
Payer: MEDICARE

## 2020-01-10 VITALS
HEART RATE: 70 BPM | SYSTOLIC BLOOD PRESSURE: 137 MMHG | TEMPERATURE: 98.2 F | WEIGHT: 166.3 LBS | OXYGEN SATURATION: 96 % | BODY MASS INDEX: 24.92 KG/M2 | DIASTOLIC BLOOD PRESSURE: 78 MMHG

## 2020-01-10 DIAGNOSIS — D64.9 ANEMIA, UNSPECIFIED TYPE: ICD-10-CM

## 2020-01-10 DIAGNOSIS — N18.30 CKD (CHRONIC KIDNEY DISEASE) STAGE 3, GFR 30-59 ML/MIN (H): Primary | ICD-10-CM

## 2020-01-10 DIAGNOSIS — R79.89 ELEVATED SERUM CREATININE: ICD-10-CM

## 2020-01-10 DIAGNOSIS — N18.30 CKD (CHRONIC KIDNEY DISEASE) STAGE 3, GFR 30-59 ML/MIN (H): ICD-10-CM

## 2020-01-10 DIAGNOSIS — I10 BENIGN ESSENTIAL HYPERTENSION: ICD-10-CM

## 2020-01-10 LAB
ALBUMIN SERPL-MCNC: 3.3 G/DL (ref 3.4–5)
ANION GAP SERPL CALCULATED.3IONS-SCNC: 5 MMOL/L (ref 3–14)
BUN SERPL-MCNC: 23 MG/DL (ref 7–30)
CALCIUM SERPL-MCNC: 8.7 MG/DL (ref 8.5–10.1)
CHLORIDE SERPL-SCNC: 105 MMOL/L (ref 94–109)
CO2 SERPL-SCNC: 27 MMOL/L (ref 20–32)
CREAT SERPL-MCNC: 1.28 MG/DL (ref 0.52–1.04)
CREAT UR-MCNC: 88 MG/DL
ERYTHROCYTE [DISTWIDTH] IN BLOOD BY AUTOMATED COUNT: 13.2 % (ref 10–15)
GFR SERPL CREATININE-BSD FRML MDRD: 44 ML/MIN/{1.73_M2}
GLUCOSE SERPL-MCNC: 98 MG/DL (ref 70–99)
HCT VFR BLD AUTO: 36.5 % (ref 35–47)
HGB BLD-MCNC: 11.6 G/DL (ref 11.7–15.7)
MCH RBC QN AUTO: 29.9 PG (ref 26.5–33)
MCHC RBC AUTO-ENTMCNC: 31.8 G/DL (ref 31.5–36.5)
MCV RBC AUTO: 94 FL (ref 78–100)
PHOSPHATE SERPL-MCNC: 3.4 MG/DL (ref 2.5–4.5)
PLATELET # BLD AUTO: 235 10E9/L (ref 150–450)
POTASSIUM SERPL-SCNC: 4.5 MMOL/L (ref 3.4–5.3)
PROT UR-MCNC: 0.05 G/L
PROT/CREAT 24H UR: 0.06 G/G CR (ref 0–0.2)
RBC # BLD AUTO: 3.88 10E12/L (ref 3.8–5.2)
SODIUM SERPL-SCNC: 137 MMOL/L (ref 133–144)
WBC # BLD AUTO: 5.5 10E9/L (ref 4–11)

## 2020-01-10 PROCEDURE — 84156 ASSAY OF PROTEIN URINE: CPT

## 2020-01-10 PROCEDURE — 85027 COMPLETE CBC AUTOMATED: CPT

## 2020-01-10 PROCEDURE — G0463 HOSPITAL OUTPT CLINIC VISIT: HCPCS | Mod: ZF

## 2020-01-10 PROCEDURE — 80069 RENAL FUNCTION PANEL: CPT

## 2020-01-10 PROCEDURE — 36415 COLL VENOUS BLD VENIPUNCTURE: CPT

## 2020-01-10 RX ORDER — FERROUS SULFATE 325(65) MG
325 TABLET ORAL
COMMUNITY
End: 2020-12-30

## 2020-01-10 ASSESSMENT — PAIN SCALES - GENERAL: PAINLEVEL: NO PAIN (0)

## 2020-01-10 NOTE — NURSING NOTE
"Chief Complaint   Patient presents with     RECHECK     Follow up CKD     Vital signs:  Temp: 98.2  F (36.8  C)   BP: 137/78 Pulse: 70     SpO2: 96 %       Weight: 75.4 kg (166 lb 4.8 oz)  Estimated body mass index is 24.92 kg/m  as calculated from the following:    Height as of 9/10/19: 1.74 m (5' 8.5\").    Weight as of this encounter: 75.4 kg (166 lb 4.8 oz).        Sandie Segura, CMA    "

## 2020-01-10 NOTE — PROGRESS NOTES
Nephrology Clinic Visit 1/10/20    Assessment and Plan:    1. CKD3 w/o proteinuria - Creat has returned to baseline after holding Lisinopril and improved blood pressures. Creat today is 1.2, eGFR 44 ml/mn, UPCR 0.06 g/gCr.   Baseline creat 1.1-1.3 since 2016.    - Etiology of her CKD possibly HTN   - Additional w/u was neg including SPEP, Light chain ratio. She has no proteinuria and her renal US was unremarkable.    - We reviewed stages of CKD today   - Blood pressure at goal of < 130/80   - Discussed role of uncontrolled HTN on progression of CKD. So encouraged to continue to monitor at home    2. HTN/Volume status - Euvolemic. Normotensive. -130/. Weight 75.4 kg. Not on antihypertensives at this time.   Was diagnosed with HTN during very stressful time of her life and had significant elevations into the 190/ range.    - Continue to monitor home blood pressures. Goal is < 130/80   - Follows a sodium restricted diet and is exercising regularly    3. Electrolytes - No acute concerns. K 4.5, Na 137    4. Acid base - No acute concerns. Bicarb 27    5. BMD - Ca 8.7, Phos 3.4, albumin 3.3   - Vit D 23, PTH 59 (11/19)   - Not on Ca or D    6. Anemia - Patient notes h/o KELLY   - Hgb 11.6, improved from last visit at 10.8   - Iron studies 12/19: Ferritin 108, fe 47, IS 18   - No menses   - Had colonoscopy 2016   - Complete iron Rx then discontinue    7. Disposition - Has f/u scheduled w/Dr Salcedo 4/15/20    Assessment and plan was discussed with patient and she voiced her understanding and agreement.      Reason for Visit:  CKD3/HTN    HPI:  Ms Reynolds is a 65 yo female with CKD3, HTN, Anemia, in today for f/u from appt with Dr Salcedo 11/27/19. At that visit patient found to have elevated creat of 1.6, which was above her baseline of 1.1-1.3. Her blood pressure was low, so Lisinopril was held. She had a variety of tests including SPEP, Light chain ratio, UPCR which were normal. Her renal US was only  significant for a possible duplicate collecting system in the left kidney. Since holding Lisinopril, blood pressures 120-130/ and she is no longer having lightheadedness spells.     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 Her blood pressure irving to 190's/ during that time and patient was started on Lisinopril. Mother  4 years ago and she retired in 2019 and blood pressures have steadily declined. Her Lisinopril dose was decreased prior to visit with Dr Salcedo due to declining blood pressure. Now off Lisinopril since visit 19 with home blood pressures 120-130/.     ROS:   No complaints  Denies CP, dyspnea, abdominal pain  No voiding issues  No edema  Appetite is good      Chronic Health Problems:  CKD3  HTN  Ammonia exposure when train derailed   Depression  Anemia  ADD    Family Hx:   Family History   Problem Relation Age of Onset     Arthritis Mother      Osteoporosis Mother         at age 75     Heart Disease Mother         arhythmia     Allergies Mother         seasonal     Alzheimer Disease Mother         at age 81     C.A.D. Father         bypass at age 78 and Pace maker at age 90     Diabetes Father         type 2     Heart Disease Father      Gastrointestinal Disease Father         ulcers     Breast Cancer Maternal Grandmother         at age 40's     Cerebrovascular Disease Maternal Grandfather         at age 70's     C.A.D. Paternal Grandmother         congestive heart failure     Respiratory Brother         bronchiestis     Personal Hx:   Single, Retired  as Occupational therapist, Enjoys a variety of needlework, NS, ETOH none. Walking daily    Allergies:  Allergies   Allergen Reactions     No Known Allergies        Medications:  Current Outpatient Medications   Medication Sig     albuterol (PROAIR HFA/PROVENTIL HFA/VENTOLIN HFA) 108 (90 Base) MCG/ACT inhaler Inhale 2 puffs  into the lungs every 6 hours as needed for shortness of breath / dyspnea or wheezing     ASPIRIN 325 MG OR TABS 1 tab in am     ferrous sulfate (FEROSUL) 325 (65 Fe) MG tablet Take 325 mg by mouth daily (with breakfast)     FLUoxetine (PROZAC) 20 MG capsule TAKE 3 CAPSULES BY MOUTH IN THE MORNING     methylphenidate (RITALIN) 10 MG tablet Take 10 mg by mouth 2 times daily. 1.5 in AM, 1.5 NOON, 20mg in PM if needed.     REFRESH 1 % OP SOLN as needed     No current facility-administered medications for this visit.       Vitals:  /78   Pulse 70   Temp 98.2  F (36.8  C)   Wt 75.4 kg (166 lb 4.8 oz)   LMP 08/15/2010   SpO2 96%   BMI 24.92 kg/m      Exam:  GEN: Pleasant female in NAD  CARDIAC: RRR  LUNGS: CTA  ABDOMEN: Soft, NT  EXT: No edema  NEURO: A/O    LABS:   CMP  Recent Labs   Lab Test 01/10/20  0741 12/02/19  0732 11/27/19  0835 09/10/19  1430    135 138 138   POTASSIUM 4.5 4.4 4.5 4.4   CHLORIDE 105 102 107 105   CO2 27 27 27 27   ANIONGAP 5 6 5 6   GLC 98 116* 93 85   BUN 23 19 29 25   CR 1.28* 1.27* 1.63* 1.09*   GFRESTIMATED 44* 44* 33* 53*   GFRESTBLACK 51* 51* 38* 62   MANJIT 8.7 9.2 8.9 9.2     Recent Labs   Lab Test 09/10/19  1430 10/31/17  1025 05/08/15  1141   BILITOTAL 0.3 0.5 0.4   ALKPHOS 84 118 79   ALT 25 47 30   AST 19 26 22     CBC  Recent Labs   Lab Test 01/10/20  0741 11/27/19  0835 01/16/19  1422 03/31/16  1152   HGB 11.6* 10.8* 12.1 12.4   WBC 5.5 5.1 6.7 5.6   RBC 3.88 3.59* 4.04 4.24   HCT 36.5 34.2* 37.3 38.9   MCV 94 95 92 92   MCH 29.9 30.1 30.0 29.2   MCHC 31.8 31.6 32.4 31.9   RDW 13.2 13.1 13.0 13.8    222 258 280     URINE STUDIES  Recent Labs   Lab Test 11/27/19  0842 03/22/14  0147 12/03/13  1102 06/12/12  1455   COLOR Yellow Yellow Yellow Yellow   APPEARANCE Slightly Cloudy Clear Clear Clear   URINEGLC Negative Negative Negative Negative   URINEBILI Negative Negative Negative Negative   URINEKETONE 5* Negative Negative Negative   SG 1.021 1.022 1.025 1.010    UBLD Negative Negative Negative Trace*   URINEPH 5.0 5.5 5.5 5.0   PROTEIN Negative Negative Negative Negative   UROBILINOGEN  --   --  0.2 0.2   NITRITE Negative Negative Negative Negative   LEUKEST Negative Trace* Small* Large*   RBCU 1 1 O - 2 O - 2   WBCU 2 7* 2-5* 10-25*     Recent Labs   Lab Test 01/10/20  0756 11/27/19  0842   UTPG 0.06 0.04     PTH  Recent Labs   Lab Test 11/27/19  0835   PTHI 59     IRON STUDIES  Recent Labs   Lab Test 11/27/19  0835   IRON 47      IRONSAT 18   ARACELI 108     EXAMINATION: US RENAL COMPLETE, 11/27/2019 12:56 PM      COMPARISON: None.     History: elevated creatinine; Elevated serum creatinine      TECHNIQUE: The kidneys and bladder were scanned in the standard  fashion with specialized ultrasound transducer(s) using both gray  scale and limited color/spectral Doppler techniques.     FINDINGS:     Right kidney: Measures 9.0 cm in length. Parenchyma is of normal  thickness and echogenicity. No focal mass. No hydronephrosis.     Left kidney: Measures 9.2 cm in length. Parenchyma is of normal  thickness and echogenicity. No focal mass. Mild caliectasis in the  inferior pole. Bilobed pelvic hilum versus prominent column of Joaquin.        Bladder: Unremarkable.                                                                      IMPRESSION:  1.  No acute renal abnormality.  2.  Mild caliectasis of the inferior pole of the left kidney,  asymmetry between the superior pole and inferior pole suggests  possible duplicated left collecting system.      Lore Ngo, ANNETTA

## 2020-01-10 NOTE — LETTER
1/10/2020      RE: Maggi Reynolds  [address]  [place] [address]       Nephrology Clinic Visit 1/10/20    Assessment and Plan:    1. CKD3 w/o proteinuria - Creat has returned to baseline after holding Lisinopril and improved blood pressures. Creat today is 1.2, eGFR 44 ml/mn, UPCR 0.06 g/gCr.   Baseline creat 1.1-1.3 since 2016.    - Etiology of her CKD possibly HTN   - Additional w/u was neg including SPEP, Light chain ratio. She has no proteinuria and her renal US was unremarkable.    - We reviewed stages of CKD today   - Blood pressure at goal of < 130/80   - Discussed role of uncontrolled HTN on progression of CKD. So encouraged to continue to monitor at home    2. HTN/Volume status - Euvolemic. Normotensive. -130/. Weight 75.4 kg. Not on antihypertensives at this time.   Was diagnosed with HTN during very stressful time of her life and had significant elevations into the 190/ range.    - Continue to monitor home blood pressures. Goal is < 130/80   - Follows a sodium restricted diet and is exercising regularly    3. Electrolytes - No acute concerns. K 4.5, Na 137    4. Acid base - No acute concerns. Bicarb 27    5. BMD - Ca 8.7, Phos 3.4, albumin 3.3   - Vit D 23, PTH 59 (11/19)   - Not on Ca or D    6. Anemia - Patient notes h/o KELLY   - Hgb 11.6, improved from last visit at 10.8   - Iron studies 12/19: Ferritin 108, fe 47, IS 18   - No menses   - Had colonoscopy 2016   - Complete iron Rx then discontinue    7. Disposition - Has f/u scheduled w/Dr Salcedo 4/15/20    Assessment and plan was discussed with patient and she voiced her understanding and agreement.      Reason for Visit:  CKD3/HTN    HPI:  Ms Reynolds is a 63 yo female with CKD3, HTN, Anemia, in today for f/u from appt with Dr Salcedo 11/27/19. At that visit patient found to have elevated creat of 1.6, which was above her baseline of 1.1-1.3. Her blood pressure was low, so Lisinopril was held. She had a variety of tests  including SPEP, Light chain ratio, UPCR which were normal. Her renal US was only significant for a possible duplicate collecting system in the left kidney. Since holding Lisinopril, blood pressures 120-130/ and she is no longer having lightheadedness spells.     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 Her blood pressure irving to 190's/ during that time and patient was started on Lisinopril. Mother  4 years ago and she retired in 2019 and blood pressures have steadily declined. Her Lisinopril dose was decreased prior to visit with Dr Salcedo due to declining blood pressure. Now off Lisinopril since visit 19 with home blood pressures 120-130/.     ROS:   No complaints  Denies CP, dyspnea, abdominal pain  No voiding issues  No edema  Appetite is good      Chronic Health Problems:  CKD3  HTN  Ammonia exposure when train derailed   Depression  Anemia  ADD    Family Hx:   Family History   Problem Relation Age of Onset     Arthritis Mother      Osteoporosis Mother         at age 75     Heart Disease Mother         arhythmia     Allergies Mother         seasonal     Alzheimer Disease Mother         at age 81     C.A.D. Father         bypass at age 78 and Pace maker at age 90     Diabetes Father         type 2     Heart Disease Father      Gastrointestinal Disease Father         ulcers     Breast Cancer Maternal Grandmother         at age 40's     Cerebrovascular Disease Maternal Grandfather         at age 70's     C.A.D. Paternal Grandmother         congestive heart failure     Respiratory Brother         bronchiestis     Personal Hx:   Single, Retired  as Occupational therapist, Enjoys a variety of needlework, NS, ETOH none. Walking daily    Allergies:  Allergies   Allergen Reactions     No Known Allergies        Medications:  Current Outpatient Medications   Medication Sig     albuterol  (PROAIR HFA/PROVENTIL HFA/VENTOLIN HFA) 108 (90 Base) MCG/ACT inhaler Inhale 2 puffs into the lungs every 6 hours as needed for shortness of breath / dyspnea or wheezing     ASPIRIN 325 MG OR TABS 1 tab in am     ferrous sulfate (FEROSUL) 325 (65 Fe) MG tablet Take 325 mg by mouth daily (with breakfast)     FLUoxetine (PROZAC) 20 MG capsule TAKE 3 CAPSULES BY MOUTH IN THE MORNING     methylphenidate (RITALIN) 10 MG tablet Take 10 mg by mouth 2 times daily. 1.5 in AM, 1.5 NOON, 20mg in PM if needed.     REFRESH 1 % OP SOLN as needed     No current facility-administered medications for this visit.       Vitals:  /78   Pulse 70   Temp 98.2  F (36.8  C)   Wt 75.4 kg (166 lb 4.8 oz)   LMP 08/15/2010   SpO2 96%   BMI 24.92 kg/m       Exam:  GEN: Pleasant female in NAD  CARDIAC: RRR  LUNGS: CTA  ABDOMEN: Soft, NT  EXT: No edema  NEURO: A/O    LABS:   CMP  Recent Labs   Lab Test 01/10/20  0741 12/02/19  0732 11/27/19  0835 09/10/19  1430    135 138 138   POTASSIUM 4.5 4.4 4.5 4.4   CHLORIDE 105 102 107 105   CO2 27 27 27 27   ANIONGAP 5 6 5 6   GLC 98 116* 93 85   BUN 23 19 29 25   CR 1.28* 1.27* 1.63* 1.09*   GFRESTIMATED 44* 44* 33* 53*   GFRESTBLACK 51* 51* 38* 62   MANJIT 8.7 9.2 8.9 9.2     Recent Labs   Lab Test 09/10/19  1430 10/31/17  1025 05/08/15  1141   BILITOTAL 0.3 0.5 0.4   ALKPHOS 84 118 79   ALT 25 47 30   AST 19 26 22     CBC  Recent Labs   Lab Test 01/10/20  0741 11/27/19  0835 01/16/19  1422 03/31/16  1152   HGB 11.6* 10.8* 12.1 12.4   WBC 5.5 5.1 6.7 5.6   RBC 3.88 3.59* 4.04 4.24   HCT 36.5 34.2* 37.3 38.9   MCV 94 95 92 92   MCH 29.9 30.1 30.0 29.2   MCHC 31.8 31.6 32.4 31.9   RDW 13.2 13.1 13.0 13.8    222 258 280     URINE STUDIES  Recent Labs   Lab Test 11/27/19  0842 03/22/14  0147 12/03/13  1102 06/12/12  1455   COLOR Yellow Yellow Yellow Yellow   APPEARANCE Slightly Cloudy Clear Clear Clear   URINEGLC Negative Negative Negative Negative   URINEBILI Negative Negative Negative  Negative   URINEKETONE 5* Negative Negative Negative   SG 1.021 1.022 1.025 1.010   UBLD Negative Negative Negative Trace*   URINEPH 5.0 5.5 5.5 5.0   PROTEIN Negative Negative Negative Negative   UROBILINOGEN  --   --  0.2 0.2   NITRITE Negative Negative Negative Negative   LEUKEST Negative Trace* Small* Large*   RBCU 1 1 O - 2 O - 2   WBCU 2 7* 2-5* 10-25*     Recent Labs   Lab Test 01/10/20  0756 11/27/19  0842   UTPG 0.06 0.04     PTH  Recent Labs   Lab Test 11/27/19  0835   PTHI 59     IRON STUDIES  Recent Labs   Lab Test 11/27/19  0835   IRON 47      IRONSAT 18   ARACELI 108     EXAMINATION: US RENAL COMPLETE, 11/27/2019 12:56 PM      COMPARISON: None.     History: elevated creatinine; Elevated serum creatinine      TECHNIQUE: The kidneys and bladder were scanned in the standard  fashion with specialized ultrasound transducer(s) using both gray  scale and limited color/spectral Doppler techniques.     FINDINGS:     Right kidney: Measures 9.0 cm in length. Parenchyma is of normal  thickness and echogenicity. No focal mass. No hydronephrosis.     Left kidney: Measures 9.2 cm in length. Parenchyma is of normal  thickness and echogenicity. No focal mass. Mild caliectasis in the  inferior pole. Bilobed pelvic hilum versus prominent column of Joaquin.        Bladder: Unremarkable.                                                                      IMPRESSION:  1.  No acute renal abnormality.  2.  Mild caliectasis of the inferior pole of the left kidney,  asymmetry between the superior pole and inferior pole suggests  possible duplicated left collecting system.      Lore Ngo, NP

## 2020-02-12 ENCOUNTER — DOCUMENTATION ONLY (OUTPATIENT)
Dept: CARE COORDINATION | Facility: CLINIC | Age: 65
End: 2020-02-12

## 2020-03-15 ENCOUNTER — HEALTH MAINTENANCE LETTER (OUTPATIENT)
Age: 65
End: 2020-03-15

## 2020-04-09 ENCOUNTER — MYC MEDICAL ADVICE (OUTPATIENT)
Dept: FAMILY MEDICINE | Facility: CLINIC | Age: 65
End: 2020-04-09

## 2020-04-09 DIAGNOSIS — F33.0 MILD RECURRENT MAJOR DEPRESSION (H): ICD-10-CM

## 2020-04-09 NOTE — TELEPHONE ENCOUNTER
"Fluoxetine  Last Written Prescription Date:  09/10/2019  Last Fill Quantity: 270,  # refills: 1   Last office visit: 9/10/2019 with prescribing provider:  yes   Future Office Visit:      Requested Prescriptions   Pending Prescriptions Disp Refills     FLUoxetine (PROZAC) 20 MG capsule [Pharmacy Med Name: FLUOXETINE HCL 20 MG CAPSULE] 270 capsule 1     Sig: TAKE 3 CAPSULES BY MOUTH IN THE MORNING       SSRIs Protocol Failed - 4/9/2020 12:37 AM        Failed - PHQ-9 score less than 5 in past 6 months     Please review last PHQ-9 score.           Failed - Recent (6 mo) or future (30 days) visit within the authorizing provider's specialty     Patient had office visit in the last 6 months or has a visit in the next 30 days with authorizing provider or within the authorizing provider's specialty.  See \"Patient Info\" tab in inbasket, or \"Choose Columns\" in Meds & Orders section of the refill encounter.            Passed - Medication is active on med list        Passed - Patient is age 18 or older        Passed - No active pregnancy on record        Passed - No positive pregnancy test in last 12 months           "

## 2020-04-09 NOTE — TELEPHONE ENCOUNTER
Due for follow up.,  Note from 9/10/19 OV:   Return in about 3 months (around 12/10/2019) for follow up visit.   [facility] message sent to patient  Carolyn Peña RN

## 2020-04-10 ENCOUNTER — VIRTUAL VISIT (OUTPATIENT)
Dept: FAMILY MEDICINE | Facility: CLINIC | Age: 65
End: 2020-04-10
Payer: MEDICARE

## 2020-04-10 DIAGNOSIS — F33.0 MILD RECURRENT MAJOR DEPRESSION (H): ICD-10-CM

## 2020-04-10 PROCEDURE — 99213 OFFICE O/P EST LOW 20 MIN: CPT | Mod: 95 | Performed by: FAMILY MEDICINE

## 2020-04-10 NOTE — PROGRESS NOTES
"Subjective     Maggi Reynolds is a 64 year old female who is being evaluated via a billable telephone visit.      The patient has been notified of following:     \"This telephone visit will be conducted via a call between you and your physician/provider. We have found that certain health care needs can be provided without the need for a physical exam.  This service lets us provide the care you need with a short phone conversation.  If a prescription is necessary we can send it directly to your pharmacy.  If lab work is needed we can place an order for that and you can then stop by our lab to have the test done at a later time.    Telephone visits are billed at different rates depending on your insurance coverage. During this emergency period, for some insurers they may be billed the same as an in-person visit.  Please reach out to your insurance provider with any questions.    If during the course of the call the physician/provider feels a telephone visit is not appropriate, you will not be charged for this service.\"    Patient has given verbal consent for Telephone visit?  Yes    How would you like to obtain your AVS? Anna    Maggi Reynolds complains of No chief complaint on file.      ALLERGIES  No known allergies    Medication Followup of FLUOXETINE    Taking Medication as prescribed: yes    Side Effects:  None    Medication Helping Symptoms:  yes          Pt is doing well on the Prozac 60 mg daily dose and she needs a refill, she has been on this for a few yrs now and it is working well for her , considering the global situation with the covid19 pandemic she is still not very stressed out , she does walk outside daily but she and her  are both retired so staying at home is not a problem.    Patient Active Problem List   Diagnosis     Disorder of bone and cartilage     Disease of lung     Mild recurrent major depression (H)     CARDIOVASCULAR SCREENING; LDL GOAL LESS THAN 160     " Hypertension goal BP (blood pressure) < 140/90     Advanced directives, counseling/discussion     Discharge from left nipple     Cervical lymphadenopathy     CKD (chronic kidney disease) stage 3, GFR 30-59 ml/min (H)     Elevated serum creatinine     Benign essential hypertension     Past Surgical History:   Procedure Laterality Date     DILATION AND CURETTAGE  8/21/2013    Procedure: DILATION AND CURETTAGE;;  Surgeon: Tamar Walsh MD;  Location: [facility] OR     LAPAROSCOPIC SALPINGO-OOPHORECTOMY  8/21/2013    Procedure: LAPAROSCOPIC SALPINGO-OOPHORECTOMY;  Laparoscopic Bilateral Salpingo-Oophorectomy, Pelvic washings, Dilation and Curettage;  Surgeon: Tamar Walsh MD;  Location: [facility] OR     LUMPECTOMY BREAST Bilateral 5/31/2016    Procedure: LUMPECTOMY BREAST;  Surgeon: Barney Givens MD;  Location:  OR       Social History     Tobacco Use     Smoking status: Never Smoker     Smokeless tobacco: Never Used   Substance Use Topics     Alcohol use: Yes     Alcohol/week: 0.0 standard drinks     Comment: 1 glass wine per month     Family History   Problem Relation Age of Onset     Arthritis Mother      Osteoporosis Mother         at age 75     Heart Disease Mother         arhythmia     Allergies Mother         seasonal     Alzheimer Disease Mother         at age 81     C.A.D. Father         bypass at age 78 and Pace maker at age 90     Diabetes Father         type 2     Heart Disease Father      Gastrointestinal Disease Father         ulcers     Breast Cancer Maternal Grandmother         at age 40's     Cerebrovascular Disease Maternal Grandfather         at age 70's     C.A.D. Paternal Grandmother         congestive heart failure     Respiratory Brother         bronchiestis         Current Outpatient Medications   Medication Sig Dispense Refill     albuterol (PROAIR HFA/PROVENTIL HFA/VENTOLIN HFA) 108 (90 Base) MCG/ACT inhaler Inhale 2 puffs into the lungs every 6 hours as needed for shortness of  breath / dyspnea or wheezing 1 Inhaler 3     ferrous sulfate (FEROSUL) 325 (65 Fe) MG tablet Take 325 mg by mouth daily (with breakfast)       FLUoxetine (PROZAC) 20 MG capsule TAKE 3 CAPSULES BY MOUTH IN THE MORNING 270 capsule 1     methylphenidate (RITALIN) 10 MG tablet Take 10 mg by mouth 2 times daily. 1.5 in AM, 1.5 NOON, 20mg in PM if needed.       REFRESH 1 % OP SOLN as needed 1 0     ASPIRIN 325 MG OR TABS 1 tab in am (Patient not taking: No sig reported) qs prn     Allergies   Allergen Reactions     No Known Allergies      Recent Labs   Lab Test 01/10/20  0741 12/02/19  0732  09/10/19  1430 01/16/19  1422 10/31/17  1025 03/31/16  1152 05/08/15  1141   A1C  --   --   --   --   --  5.2  --   --    LDL  --   --   --  124*  --  85 128*  --    HDL  --   --   --  95  --  111 135  --    TRIG  --   --   --  57  --  56 59  --    ALT  --   --   --  25  --  47  --  30   CR 1.28* 1.27*   < > 1.09* 1.29* 1.25*  --  1.05*   GFRESTIMATED 44* 44*   < > 53* 44* 43*  --  53*   GFRESTBLACK 51* 51*   < > 62 50* 52*  --  64   POTASSIUM 4.5 4.4   < > 4.4 4.4 4.6  --  4.7   TSH  --   --   --   --  2.22  --  1.79  --     < > = values in this interval not displayed.      BP Readings from Last 3 Encounters:   01/10/20 137/78   11/27/19 135/82   09/10/19 95/61    Wt Readings from Last 3 Encounters:   01/10/20 75.4 kg (166 lb 4.8 oz)   11/27/19 75.3 kg (165 lb 14.4 oz)   09/10/19 77.4 kg (170 lb 11.2 oz)                    Reviewed and updated as needed this visit by Provider         Review of Systems   ROS COMP: Constitutional, HEENT, cardiovascular, pulmonary, GI, , musculoskeletal, neuro, skin, endocrine and psych systems are negative, except as otherwise noted.       Objective   Reported vitals:  LMP 08/15/2010    healthy, alert and no distress  Psych: Alert and oriented times 3; coherent speech, normal   rate and volume, able to articulate logical thoughts, able   to abstract reason, no tangential thoughts, no hallucinations    or delusions  Her affect is normal      Diagnostic Test Results:  Labs reviewed in Epic  none         Assessment/Plan:  1. Mild recurrent major depression (H)  We discussed her anxiety and depression and she is doing well on the 60 mg of the Prozac now and wishes to continue   - FLUoxetine (PROZAC) 20 MG capsule; TAKE 3 CAPSULES BY MOUTH IN THE MORNING  Dispense: 270 capsule; Refill: 1    Also, she has seen nephrology about her HTN and elevated GFR , she is now off the lisinopril as she BP has been in the 120s over 80s or even 70s and she has a f/u with nephro next week and they will be rechecking her labs , anemia is also under control and she has stopped the iron daily supplement. Doing well currently .  RTC if no improving or worsening.  Pt is aware  and comfortable with the current plan.        Phone call duration:  6  minutes    Sara Thomas MD

## 2020-04-15 ENCOUNTER — VIRTUAL VISIT (OUTPATIENT)
Dept: NEPHROLOGY | Facility: CLINIC | Age: 65
End: 2020-04-15
Attending: INTERNAL MEDICINE
Payer: COMMERCIAL

## 2020-04-15 DIAGNOSIS — D50.9 IRON DEFICIENCY ANEMIA, UNSPECIFIED IRON DEFICIENCY ANEMIA TYPE: ICD-10-CM

## 2020-04-15 DIAGNOSIS — N18.30 ANEMIA OF CHRONIC KIDNEY FAILURE, STAGE 3 (MODERATE) (H): ICD-10-CM

## 2020-04-15 DIAGNOSIS — D63.1 ANEMIA OF CHRONIC KIDNEY FAILURE, STAGE 3 (MODERATE) (H): ICD-10-CM

## 2020-04-15 DIAGNOSIS — N18.30 CKD (CHRONIC KIDNEY DISEASE) STAGE 3, GFR 30-59 ML/MIN (H): Primary | ICD-10-CM

## 2020-04-15 ASSESSMENT — PAIN SCALES - GENERAL: PAINLEVEL: NO PAIN (0)

## 2020-04-15 NOTE — PROGRESS NOTES
"Maggi Reynolds is a 64 year old female who is being evaluated via a billable video visit.      The patient has been notified of following:     \"This video visit will be conducted via a call between you and your physician/provider. We have found that certain health care needs can be provided without the need for an in-person physical exam.  This service lets us provide the care you need with a video conversation.  If a prescription is necessary we can send it directly to your pharmacy.  If lab work is needed we can place an order for that and you can then stop by our lab to have the test done at a later time.    Video visits are billed at different rates depending on your insurance coverage.  Please reach out to your insurance provider with any questions.    If during the course of the call the physician/provider feels a video visit is not appropriate, you will not be charged for this service.\"    Patient has given verbal consent for Video visit? Yes    How would you like to obtain your AVS? Anna    Patient would like the video invitation sent by: Text to cell phone: [phone]      Video Start Time: 2:34 PM     Nephrology Clinic Visit 1/10/20    Assessment and Plan:    1. CKD3 w/o proteinuria - stable with creatinine 1.3 mg/dL on 1/20/2020.  Baseline creat 1.1-1.3 since 2016.    - Etiology of her CKD possibly CAROLYN due to episode of accelerated HTN    - Additional w/u was neg including SPEP, Light chain ratio. She has no proteinuria and her renal US was unremarkable.    - Blood pressure at goal of < 130/80    2. HTN/Volume status - BP at goal without medication   - Continue to monitor home blood pressures. Goal is < 130/80   - Follows a sodium restricted diet and is exercising regularly    3. Anemia - history of iron deficiency, took supplement for 100 days  Now off iron supplement  - hemoglobin and iron with next set of labs    7. Disposition - follow up labs summer 2020 and clinic follow up to be " determined    Assessment and plan was discussed with patient and she voiced her understanding and agreement.    Siomara Salcedo MD  [facility]  Department of Medicine  Division of Renal Disease and Hypertension  [phone]     Reason for Visit:  CKD3/HTN    HPI:  Ms Reynolds is a 65 yo female with CKD3, HTN, Anemia.  Last time I saw her she had hypotension and elevated creatinine, this improved with discontinueation of lisinotpril    After her appointment with me she had a follow-up with Jessica Ngo.  Since then she has continued to do well, she has no chest pain no shortness of breath and no leg edema.  She has generally remained in her home for the last month or so due to the COVID pandemic.  She does go for at least a 1 mile walk daily and she does not get any shortness of breath or chest pain during her walk.  Her blood pressure at home is running 120/70 and she remains off lisinopril.  Her appetite is good, energy is stable, no nausea or vomiting no fever chills no excessive fatigue.  She had been noted to have low iron levels with anemia last fall and she took iron supplement for approximately 100 days.  She had a hemoglobin check January 2020 and it came back at 11.6.  Her iron levels have not been rechecked since she took the iron supplement.  ROS:    ROS: 10 point ROS neg other than the symptoms noted above in the HPI.       Chronic Health Problems:  CKD3  HTN  Ammonia exposure when train derailed   Depression  Anemia  ADD    Family Hx:   Family History   Problem Relation Age of Onset     Arthritis Mother      Osteoporosis Mother         at age 75     Heart Disease Mother         arhythmia     Allergies Mother         seasonal     Alzheimer Disease Mother         at age 81     C.A.D. Father         bypass at age 78 and Pace maker at age 90     Diabetes Father         type 2     Heart Disease Father      Gastrointestinal Disease Father         ulcers     Breast Cancer Maternal  Grandmother         at age 40's     Cerebrovascular Disease Maternal Grandfather         at age 70's     C.A.D. 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 Walking daily    Allergies:  Allergies   Allergen Reactions     No Known Allergies        Medications:  Current Outpatient Medications   Medication Sig     albuterol (PROAIR HFA/PROVENTIL HFA/VENTOLIN HFA) 108 (90 Base) MCG/ACT inhaler Inhale 2 puffs into the lungs every 6 hours as needed for shortness of breath / dyspnea or wheezing     FLUoxetine (PROZAC) 20 MG capsule TAKE 3 CAPSULES BY MOUTH IN THE MORNING     methylphenidate (RITALIN) 10 MG tablet Take 10 mg by mouth 2 times daily. 1.5 in AM, 1.5 NOON, 20mg in PM if needed.     REFRESH 1 % OP SOLN as needed     ASPIRIN 325 MG OR TABS 1 tab in am (Patient not taking: No sig reported)     ferrous sulfate (FEROSUL) 325 (65 Fe) MG tablet Take 325 mg by mouth daily (with breakfast)     No current facility-administered medications for this visit.       Vitals:  LMP 08/15/2010     Exam:  GEN: Pleasant female in NAD  CARDIAC: RRR  LUNGS: CTA  ABDOMEN: Soft, NT  EXT: No edema  NEURO: A/O    LABS:   CMP  Recent Labs   Lab Test 01/10/20  0741 12/02/19  0732 11/27/19  0835 09/10/19  1430    135 138 138   POTASSIUM 4.5 4.4 4.5 4.4   CHLORIDE 105 102 107 105   CO2 27 27 27 27   ANIONGAP 5 6 5 6   GLC 98 116* 93 85   BUN 23 19 29 25   CR 1.28* 1.27* 1.63* 1.09*   GFRESTIMATED 44* 44* 33* 53*   GFRESTBLACK 51* 51* 38* 62   MANJIT 8.7 9.2 8.9 9.2     Recent Labs   Lab Test 09/10/19  1430 10/31/17  1025 05/08/15  1141   BILITOTAL 0.3 0.5 0.4   ALKPHOS 84 118 79   ALT 25 47 30   AST 19 26 22     CBC  Recent Labs   Lab Test 01/10/20  0741 11/27/19  0835 01/16/19  1422 03/31/16  1152   HGB 11.6* 10.8* 12.1 12.4   WBC 5.5 5.1 6.7 5.6   RBC 3.88 3.59* 4.04 4.24    HCT 36.5 34.2* 37.3 38.9   MCV 94 95 92 92   MCH 29.9 30.1 30.0 29.2   MCHC 31.8 31.6 32.4 31.9   RDW 13.2 13.1 13.0 13.8    222 258 280     URINE STUDIES  Recent Labs   Lab Test 11/27/19  0842 03/22/14  0147 12/03/13  1102 06/12/12  1455   COLOR Yellow Yellow Yellow Yellow   APPEARANCE Slightly Cloudy Clear Clear Clear   URINEGLC Negative Negative Negative Negative   URINEBILI Negative Negative Negative Negative   URINEKETONE 5* Negative Negative Negative   SG 1.021 1.022 1.025 1.010   UBLD Negative Negative Negative Trace*   URINEPH 5.0 5.5 5.5 5.0   PROTEIN Negative Negative Negative Negative   UROBILINOGEN  --   --  0.2 0.2   NITRITE Negative Negative Negative Negative   LEUKEST Negative Trace* Small* Large*   RBCU 1 1 O - 2 O - 2   WBCU 2 7* 2-5* 10-25*     Recent Labs   Lab Test 01/10/20  0756 11/27/19  0842   UTPG 0.06 0.04     PTH  Recent Labs   Lab Test 11/27/19  0835   PTHI 59     IRON STUDIES  Recent Labs   Lab Test 11/27/19  0835   IRON 47      IRONSAT 18   ARACELI 108     EXAMINATION: US RENAL COMPLETE, 11/27/2019 12:56 PM      COMPARISON: None.     History: elevated creatinine; Elevated serum creatinine      TECHNIQUE: The kidneys and bladder were scanned in the standard  fashion with specialized ultrasound transducer(s) using both gray  scale and limited color/spectral Doppler techniques.     FINDINGS:     Right kidney: Measures 9.0 cm in length. Parenchyma is of normal  thickness and echogenicity. No focal mass. No hydronephrosis.     Left kidney: Measures 9.2 cm in length. Parenchyma is of normal  thickness and echogenicity. No focal mass. Mild caliectasis in the  inferior pole. Bilobed pelvic hilum versus prominent column of Joaquin.        Bladder: Unremarkable.                                                                      IMPRESSION:  1.  No acute renal abnormality.  2.  Mild caliectasis of the inferior pole of the left kidney,  asymmetry between the superior pole and inferior  pole suggests  possible duplicated left collecting system.      Siomara Salcedo MD         Video-Visit Details    Type of service:  Video Visit    Video End Time (time video stopped): 2:46    Originating Location (pt. Location): Home    Distant Location (provider location):  [facility]     Mode of Communication:  Video Conference via [employer]      Siomara Salcedo MD      Tested connection with pt, was able to see and hear pt clearly.

## 2020-11-05 DIAGNOSIS — F33.0 MILD RECURRENT MAJOR DEPRESSION (H): ICD-10-CM

## 2020-11-05 NOTE — TELEPHONE ENCOUNTER
Fluoxetine:    One month supply sent 10/6  Takes for depression  Virtual visit 4/10/2020    Note from 4/10/2020 virtual visit:     Return in about 3 months (around 7/10/2020) for follow up visit.    [employer] message sent to patient asking her to schedule appointment  Carolyn Peña RN

## 2020-11-09 NOTE — TELEPHONE ENCOUNTER
LS,   Please advise on refill at upcoming appt 11/13/2020  Patient has plenty of medication until then  Thanks,  Jacinta CONTRERAS RN

## 2020-11-13 ENCOUNTER — VIRTUAL VISIT (OUTPATIENT)
Dept: FAMILY MEDICINE | Facility: CLINIC | Age: 65
End: 2020-11-13
Payer: MEDICARE

## 2020-11-13 DIAGNOSIS — I10 BENIGN ESSENTIAL HYPERTENSION: ICD-10-CM

## 2020-11-13 DIAGNOSIS — N18.31 STAGE 3A CHRONIC KIDNEY DISEASE (H): ICD-10-CM

## 2020-11-13 DIAGNOSIS — F33.0 MILD RECURRENT MAJOR DEPRESSION (H): Primary | ICD-10-CM

## 2020-11-13 PROCEDURE — 99214 OFFICE O/P EST MOD 30 MIN: CPT | Mod: 95 | Performed by: FAMILY MEDICINE

## 2020-11-13 ASSESSMENT — ANXIETY QUESTIONNAIRES
6. BECOMING EASILY ANNOYED OR IRRITABLE: NOT AT ALL
7. FEELING AFRAID AS IF SOMETHING AWFUL MIGHT HAPPEN: NOT AT ALL
2. NOT BEING ABLE TO STOP OR CONTROL WORRYING: NOT AT ALL
3. WORRYING TOO MUCH ABOUT DIFFERENT THINGS: NOT AT ALL
GAD7 TOTAL SCORE: 0
1. FEELING NERVOUS, ANXIOUS, OR ON EDGE: NOT AT ALL
5. BEING SO RESTLESS THAT IT IS HARD TO SIT STILL: NOT AT ALL

## 2020-11-13 ASSESSMENT — PATIENT HEALTH QUESTIONNAIRE - PHQ9
5. POOR APPETITE OR OVEREATING: NOT AT ALL
SUM OF ALL RESPONSES TO PHQ QUESTIONS 1-9: 0

## 2020-11-13 NOTE — PROGRESS NOTES
"Maggi Reynolds is a 65 year old female who is being evaluated via a billable video visit.      The patient has been notified of following:     \"This video visit will be conducted via a call between you and your physician/provider. We have found that certain health care needs can be provided without the need for an in-person physical exam.  This service lets us provide the care you need with a video conversation.  If a prescription is necessary we can send it directly to your pharmacy.  If lab work is needed we can place an order for that and you can then stop by our lab to have the test done at a later time.    Video visits are billed at different rates depending on your insurance coverage.  Please reach out to your insurance provider with any questions.    If during the course of the call the physician/provider feels a video visit is not appropriate, you will not be charged for this service.\"    Patient has given verbal consent for Video visit? Yes  How would you like to obtain your AVS? MyChart  If you are dropped from the video visit, the video invite should be resent to: Text to cell phone: [phone]  Will anyone else be joining your video visit? No      Subjective     Maggi Reynolds is a 65 year old female who presents today via video visit for the following health issues:    HPI     Depression Follow up- she has been on the Prozac 60 mg for yrs and no side effects doing well , her depression is under control , she is getting two new puppies and excited about the trip to [place] to get them .    How are you doing with your depression since your last visit? No change    Are you having other symptoms that might be associated with depression? No    Have you had a significant life event?  No     Are you feeling anxious or having panic attacks?   No    Do you have any concerns with your use of alcohol or other drugs? No    Social History     Tobacco Use     Smoking status: Never Smoker     " Smokeless tobacco: Never Used   Substance Use Topics     Alcohol use: Yes     Alcohol/week: 0.0 standard drinks     Comment: 1 glass wine per month     Drug use: No     PHQ 2/8/2019 9/10/2019 11/13/2020   PHQ-9 Total Score 1 0 0   Q9: Thoughts of better off dead/self-harm past 2 weeks Not at all Not at all Not at all     KRYSTLE-7 SCORE 6/9/2017 11/13/2020   Total Score 1 0           Suicide Assessment Five-step Evaluation and Treatment (SAFE-T)      How many servings of fruits and vegetables do you eat daily?  2-3    On average, how many sweetened beverages do you drink each day (Examples: soda, juice, sweet tea, etc.  Do NOT count diet or artificially sweetened beverages)?       How many days per week do you exercise enough to make your heart beat faster?     How many minutes a day do you exercise enough to make your heart beat faster?     How many days per week do you miss taking your medication?        Video Start Time: 9:54 am         Review of Systems   Constitutional, HEENT, cardiovascular, pulmonary, GI, , musculoskeletal, neuro, skin, endocrine and psych systems are negative, except as otherwise noted.      Objective           Vitals:  No vitals were obtained today due to virtual visit.    Physical Exam     GENERAL: Healthy, alert and no distress  EYES: Eyes grossly normal to inspection.  No discharge or erythema, or obvious scleral/conjunctival abnormalities.  RESP: No audible wheeze, cough, or visible cyanosis.  No visible retractions or increased work of breathing.    SKIN: Visible skin clear. No significant rash, abnormal pigmentation or lesions.  NEURO: Cranial nerves grossly intact.  Mentation and speech appropriate for age.  PSYCH: Mentation appears normal, affect normal/bright, judgement and insight intact, normal speech and appearance well-groomed.      No results found for this or any previous visit (from the past 24 hour(s)).        Assessment & Plan     Mild recurrent major depression (H)  We  discussed continuing on the current med ( Prozac ) at 60 mg as she has tolerated it well , no side effects , has been on it for years , she is retired , staying home now and has been healthy past few months , no medical concerns .  - FLUoxetine (PROZAC) 20 MG capsule; Take three capsules by mouth in the morning      (I10) Benign essential hypertension  Comment: is currently stable and under control, pt is following up with nephrology   Plan: well controlled     (N18.31) Stage 3a chronic kidney disease  Comment: she has seen nephro and they have ordered labs to be done , she will do them soon   Plan: as above         RTC if no improving or worsening.  Pt is aware  and comfortable with the current plan.    Sara Thomas MD  [facility]      Video-Visit Details    Type of service:  Video Visit    Video End Time:10:04 am     Originating Location (pt. Location): Home    Distant Location (provider location):  [facility]     Platform used for Video Visit: Syd

## 2020-11-13 NOTE — NURSING NOTE
"Chief Complaint   Patient presents with     Depression     initial LMP 08/15/2010  Estimated body mass index is 24.92 kg/m  as calculated from the following:    Height as of 9/10/19: 1.74 m (5' 8.5\").    Weight as of 1/10/20: 75.4 kg (166 lb 4.8 oz).  BP completed using cuff size: .  L  R arm      Health Maintenance that is potentially due pending provider review:      Markie Mckenna ma  "

## 2020-11-14 ASSESSMENT — ANXIETY QUESTIONNAIRES: GAD7 TOTAL SCORE: 0

## 2020-12-29 ASSESSMENT — ACTIVITIES OF DAILY LIVING (ADL): CURRENT_FUNCTION: NO ASSISTANCE NEEDED

## 2020-12-30 ENCOUNTER — OFFICE VISIT (OUTPATIENT)
Dept: FAMILY MEDICINE | Facility: CLINIC | Age: 65
End: 2020-12-30
Payer: MEDICARE

## 2020-12-30 VITALS
OXYGEN SATURATION: 98 % | HEIGHT: 68 IN | DIASTOLIC BLOOD PRESSURE: 75 MMHG | BODY MASS INDEX: 26.07 KG/M2 | RESPIRATION RATE: 16 BRPM | WEIGHT: 172 LBS | HEART RATE: 68 BPM | TEMPERATURE: 96.5 F | SYSTOLIC BLOOD PRESSURE: 131 MMHG

## 2020-12-30 DIAGNOSIS — Z83.3 FAMILY HISTORY OF DIABETES MELLITUS: ICD-10-CM

## 2020-12-30 DIAGNOSIS — J40 BRONCHITIS: ICD-10-CM

## 2020-12-30 DIAGNOSIS — D63.1 ANEMIA OF CHRONIC KIDNEY FAILURE, STAGE 3 (MODERATE) (H): ICD-10-CM

## 2020-12-30 DIAGNOSIS — N18.31 STAGE 3A CHRONIC KIDNEY DISEASE (H): ICD-10-CM

## 2020-12-30 DIAGNOSIS — Z00.00 ENCOUNTER FOR ROUTINE ADULT HEALTH EXAMINATION WITHOUT ABNORMAL FINDINGS: Primary | ICD-10-CM

## 2020-12-30 DIAGNOSIS — N18.30 ANEMIA OF CHRONIC KIDNEY FAILURE, STAGE 3 (MODERATE) (H): ICD-10-CM

## 2020-12-30 DIAGNOSIS — D50.9 IRON DEFICIENCY ANEMIA, UNSPECIFIED IRON DEFICIENCY ANEMIA TYPE: ICD-10-CM

## 2020-12-30 DIAGNOSIS — Z23 ENCOUNTER FOR IMMUNIZATION: ICD-10-CM

## 2020-12-30 LAB
ALBUMIN SERPL-MCNC: 3.7 G/DL (ref 3.4–5)
ANION GAP SERPL CALCULATED.3IONS-SCNC: 3 MMOL/L (ref 3–14)
BUN SERPL-MCNC: 15 MG/DL (ref 7–30)
CALCIUM SERPL-MCNC: 9.3 MG/DL (ref 8.5–10.1)
CHLORIDE SERPL-SCNC: 108 MMOL/L (ref 94–109)
CHOLEST SERPL-MCNC: 227 MG/DL
CO2 SERPL-SCNC: 28 MMOL/L (ref 20–32)
CREAT SERPL-MCNC: 1.08 MG/DL (ref 0.52–1.04)
ERYTHROCYTE [DISTWIDTH] IN BLOOD BY AUTOMATED COUNT: 13.2 % (ref 10–15)
FERRITIN SERPL-MCNC: 83 NG/ML (ref 8–252)
GFR SERPL CREATININE-BSD FRML MDRD: 53 ML/MIN/{1.73_M2}
GLUCOSE SERPL-MCNC: 98 MG/DL (ref 70–99)
HBA1C MFR BLD: 5.3 % (ref 0–5.6)
HCT VFR BLD AUTO: 39.5 % (ref 35–47)
HDLC SERPL-MCNC: 120 MG/DL
HGB BLD-MCNC: 12.5 G/DL (ref 11.7–15.7)
IRON SATN MFR SERPL: 24 % (ref 15–46)
IRON SERPL-MCNC: 70 UG/DL (ref 35–180)
LDLC SERPL CALC-MCNC: 94 MG/DL
MCH RBC QN AUTO: 29.6 PG (ref 26.5–33)
MCHC RBC AUTO-ENTMCNC: 31.6 G/DL (ref 31.5–36.5)
MCV RBC AUTO: 94 FL (ref 78–100)
NONHDLC SERPL-MCNC: 107 MG/DL
PHOSPHATE SERPL-MCNC: 3.3 MG/DL (ref 2.5–4.5)
PLATELET # BLD AUTO: 261 10E9/L (ref 150–450)
POTASSIUM SERPL-SCNC: 4.8 MMOL/L (ref 3.4–5.3)
RBC # BLD AUTO: 4.22 10E12/L (ref 3.8–5.2)
SODIUM SERPL-SCNC: 139 MMOL/L (ref 133–144)
TIBC SERPL-MCNC: 294 UG/DL (ref 240–430)
TRIGL SERPL-MCNC: 64 MG/DL
WBC # BLD AUTO: 5.2 10E9/L (ref 4–11)

## 2020-12-30 PROCEDURE — 80061 LIPID PANEL: CPT | Performed by: FAMILY MEDICINE

## 2020-12-30 PROCEDURE — 83036 HEMOGLOBIN GLYCOSYLATED A1C: CPT | Performed by: FAMILY MEDICINE

## 2020-12-30 PROCEDURE — 83550 IRON BINDING TEST: CPT | Performed by: FAMILY MEDICINE

## 2020-12-30 PROCEDURE — 36415 COLL VENOUS BLD VENIPUNCTURE: CPT | Performed by: FAMILY MEDICINE

## 2020-12-30 PROCEDURE — 82728 ASSAY OF FERRITIN: CPT | Performed by: FAMILY MEDICINE

## 2020-12-30 PROCEDURE — 90732 PPSV23 VACC 2 YRS+ SUBQ/IM: CPT | Performed by: FAMILY MEDICINE

## 2020-12-30 PROCEDURE — 99397 PER PM REEVAL EST PAT 65+ YR: CPT | Mod: 25 | Performed by: FAMILY MEDICINE

## 2020-12-30 PROCEDURE — 85027 COMPLETE CBC AUTOMATED: CPT | Performed by: FAMILY MEDICINE

## 2020-12-30 PROCEDURE — 83540 ASSAY OF IRON: CPT | Performed by: FAMILY MEDICINE

## 2020-12-30 PROCEDURE — 80069 RENAL FUNCTION PANEL: CPT | Performed by: FAMILY MEDICINE

## 2020-12-30 PROCEDURE — 99213 OFFICE O/P EST LOW 20 MIN: CPT | Mod: 25 | Performed by: FAMILY MEDICINE

## 2020-12-30 PROCEDURE — 90471 IMMUNIZATION ADMIN: CPT | Performed by: FAMILY MEDICINE

## 2020-12-30 RX ORDER — ALBUTEROL SULFATE 90 UG/1
2 AEROSOL, METERED RESPIRATORY (INHALATION) EVERY 6 HOURS PRN
Qty: 1 INHALER | Refills: 3 | Status: SHIPPED | OUTPATIENT
Start: 2020-12-30 | End: 2023-05-31

## 2020-12-30 ASSESSMENT — ACTIVITIES OF DAILY LIVING (ADL): CURRENT_FUNCTION: NO ASSISTANCE NEEDED

## 2020-12-30 ASSESSMENT — MIFFLIN-ST. JEOR: SCORE: 1372.66

## 2020-12-30 NOTE — PROGRESS NOTES
"SUBJECTIVE:   Maggi Reynolds is a 65 year old female who presents for Preventive Visit.      Patient has been advised of split billing requirements and indicates understanding: Yes   Are you in the first 12 months of your Medicare coverage?  No    Healthy Habits:     In general, how would you rate your overall health?  Excellent    Frequency of exercise:  4-5 days/week    Duration of exercise:  45-60 minutes    Do you usually eat at least 4 servings of fruit and vegetables a day, include whole grains    & fiber and avoid regularly eating high fat or \"junk\" foods?  Yes    Taking medications regularly:  Yes    Medication side effects:  Not applicable    Ability to successfully perform activities of daily living:  No assistance needed    Home Safety:  No safety concerns identified    Hearing Impairment:  No hearing concerns    In the past 6 months, have you been bothered by leaking of urine?  No    In general, how would you rate your overall mental or emotional health?  Excellent      PHQ-2 Total Score: 0    Additional concerns today:  No    Do you feel safe in your environment? Yes          Fall risk  Fallen 2 or more times in the past year?: No  Any fall with injury in the past year?: No  click delete button to remove this line now  Cognitive Screening   1) Repeat 3 items (Leader, Season, Table)    2) Clock draw: NORMAL  3) 3 item recall: Recalls 3 objects  Results: 3 items recalled: COGNITIVE IMPAIRMENT LESS LIKELY    Mini-CogTM Copyright PROSPER Webb. Licensed by the author for use in [facility]; reprinted with permission (nataliia@.[facility]). All rights reserved.      Do you have sleep apnea, excessive snoring or daytime drowsiness?: no    Reviewed and updated as needed this visit by clinical staff  Tobacco  Allergies  Meds   Med Hx  Surg Hx  Fam Hx  Soc Hx        Reviewed and updated as needed this visit by Provider                Social History     Tobacco Use     Smoking status: Never Smoker "     Smokeless tobacco: Never Used   Substance Use Topics     Alcohol use: Yes     Alcohol/week: 0.0 standard drinks     Comment: 1 glass wine per month     If you drink alcohol do you typically have >3 drinks per day or >7 drinks per week? No    Alcohol Use 12/29/2020   Prescreen: >3 drinks/day or >7 drinks/week? No   Prescreen: >3 drinks/day or >7 drinks/week? -   No flowsheet data found.    1) FH of DM type 2 , will check screening Hgb A 1 c   2) stage 3 CKD , she sees nephrology for this and will be following up with them , she does have a few labs that are placed and wishes to do today   3) iron deficiency anemia , will recheck labs today         Current providers sharing in care for this patient include:   Patient Care Team:  Sara Thomas MD as PCP - General (Family Practice)  Sara Thomas MD as Assigned PCP  Siomara Salcedo MD as MD (Nephrology)  Coco Willard, RN as Specialty Care Coordinator (Nephrology)    The following health maintenance items are reviewed in Epic and correct as of today:  Health Maintenance   Topic Date Due     ZOSTER IMMUNIZATION (1 of 2) 04/20/2004     ADVANCE CARE PLANNING  10/16/2017     DEXA  05/20/2019     Pneumococcal Vaccine: Pediatrics (0 to 5 Years) and At-Risk Patients (6 to 64 Years) (2 of 3 - PPSV23) 11/05/2019     MEDICARE ANNUAL WELLNESS VISIT  02/08/2020     MAMMO SCREENING  02/21/2020     FALL RISK ASSESSMENT  09/10/2020     Pneumococcal Vaccine: 65+ Years (2 of 2 - PPSV23) 09/10/2020     PHQ-9  05/13/2021     LIPID  09/10/2024     COLORECTAL CANCER SCREENING  07/01/2026     DTAP/TDAP/TD IMMUNIZATION (3 - Td) 10/31/2027     HEPATITIS C SCREENING  Completed     DEPRESSION ACTION PLAN  Completed     INFLUENZA VACCINE  Completed     IPV IMMUNIZATION  Aged Out     MENINGITIS IMMUNIZATION  Aged Out     HEPATITIS B IMMUNIZATION  Aged Out     Lab work is in process  Labs reviewed in EPIC  BP Readings from Last 3 Encounters:   12/30/20 131/75   01/10/20 137/78   11/27/19  135/82    Wt Readings from Last 3 Encounters:   12/30/20 78 kg (172 lb)   01/10/20 75.4 kg (166 lb 4.8 oz)   11/27/19 75.3 kg (165 lb 14.4 oz)                  Patient Active Problem List   Diagnosis     Disorder of bone and cartilage     Disease of lung     Mild recurrent major depression (H)     CARDIOVASCULAR SCREENING; LDL GOAL LESS THAN 160     Hypertension goal BP (blood pressure) < 140/90     Advanced directives, counseling/discussion     Discharge from left nipple     Cervical lymphadenopathy     CKD (chronic kidney disease) stage 3, GFR 30-59 ml/min     Elevated serum creatinine     Benign essential hypertension     Past Surgical History:   Procedure Laterality Date     DILATION AND CURETTAGE  8/21/2013    Procedure: DILATION AND CURETTAGE;;  Surgeon: Tamar Walsh MD;  Location: [facility]     LAPAROSCOPIC SALPINGO-OOPHORECTOMY  8/21/2013    Procedure: LAPAROSCOPIC SALPINGO-OOPHORECTOMY;  Laparoscopic Bilateral Salpingo-Oophorectomy, Pelvic washings, Dilation and Curettage;  Surgeon: Tamar Walsh MD;  Location:  OR     LUMPECTOMY BREAST Bilateral 5/31/2016    Procedure: LUMPECTOMY BREAST;  Surgeon: Barney Givens MD;  Location:  OR       Social History     Tobacco Use     Smoking status: Never Smoker     Smokeless tobacco: Never Used   Substance Use Topics     Alcohol use: Yes     Alcohol/week: 0.0 standard drinks     Comment: 1 glass wine per month     Family History   Problem Relation Age of Onset     Arthritis Mother      Osteoporosis Mother         at age 75     Heart Disease Mother         arhythmia     Allergies Mother         seasonal     Alzheimer Disease Mother         at age 81     C.A.D. Father         bypass at age 78 and Pace maker at age 90     Diabetes Father         type 2     Heart Disease Father      Gastrointestinal Disease Father         ulcers     Coronary Artery Disease Father      Breast Cancer Maternal Grandmother         at age 40's     Cerebrovascular Disease  Maternal Grandfather         at age 70's     C.A.D. Paternal Grandmother         congestive heart failure     Respiratory Brother         bronchiestis     Coronary Artery Disease Brother          Current Outpatient Medications   Medication Sig Dispense Refill     albuterol (PROAIR HFA/PROVENTIL HFA/VENTOLIN HFA) 108 (90 Base) MCG/ACT inhaler Inhale 2 puffs into the lungs every 6 hours as needed for shortness of breath / dyspnea or wheezing 1 Inhaler 3     FLUoxetine (PROZAC) 20 MG capsule Take three capsules by mouth in the morning 270 capsule 1     methylphenidate (RITALIN) 10 MG tablet Take 10 mg by mouth 2 times daily. 1.5 in AM, 1.5 NOON, 20mg in PM if needed.       REFRESH 1 % OP SOLN as needed 1 0     ASPIRIN 325 MG OR TABS 1 tab in am (Patient not taking: No sig reported) qs prn     ferrous sulfate (FEROSUL) 325 (65 Fe) MG tablet Take 325 mg by mouth daily (with breakfast)       Allergies   Allergen Reactions     No Known Allergies      Recent Labs   Lab Test 12/30/20  0809 01/10/20  0741 12/02/19  0732 09/10/19  1430 09/10/19  1430 01/16/19  1422 10/31/17  1025 03/31/16  1152 05/08/15  1141   A1C 5.3  --   --   --   --   --  5.2  --   --    LDL  --   --   --   --  124*  --  85 128*  --    HDL  --   --   --   --  95  --  111 135  --    TRIG  --   --   --   --  57  --  56 59  --    ALT  --   --   --   --  25  --  47  --  30   CR  --  1.28* 1.27*   < > 1.09* 1.29* 1.25*  --  1.05*   GFRESTIMATED  --  44* 44*   < > 53* 44* 43*  --  53*   GFRESTBLACK  --  51* 51*   < > 62 50* 52*  --  64   POTASSIUM  --  4.5 4.4   < > 4.4 4.4 4.6  --  4.7   TSH  --   --   --   --   --  2.22  --  1.79  --     < > = values in this interval not displayed.      Pneumonia Vaccine:Adults age 65+ who have not received previous Pneumovax (PPSV23) or PCV13 as an adult: Should first be given PCV13 AND then should be given PPSV23 6-12 months after PCV13    Review of Systems  Constitutional, HEENT, cardiovascular, pulmonary, GI, ,  "musculoskeletal, neuro, skin, endocrine and psych systems are negative, except as otherwise noted.    OBJECTIVE:   LMP 08/15/2010  Estimated body mass index is 24.92 kg/m  as calculated from the following:    Height as of 9/10/19: 1.74 m (5' 8.5\").    Weight as of 1/10/20: 75.4 kg (166 lb 4.8 oz).  Physical Exam  GENERAL: healthy, alert and no distress  EYES: Eyes grossly normal to inspection, PERRL and conjunctivae and sclerae normal  HENT: ear canals and TM's normal, nose and mouth without ulcers or lesions  NECK: no adenopathy, no asymmetry, masses, or scars and thyroid normal to palpation  RESP: lungs clear to auscultation - no rales, rhonchi or wheezes  BREAST: normal without masses, tenderness or nipple discharge and no palpable axillary masses or adenopathy  CV: regular rate and rhythm, normal S1 S2, no S3 or S4, no murmur, click or rub, no peripheral edema and peripheral pulses strong  ABDOMEN: soft, nontender, no hepatosplenomegaly, no masses and bowel sounds normal  MS: no gross musculoskeletal defects noted, no edema  SKIN: no suspicious lesions or rashes  NEURO: Normal strength and tone, mentation intact and speech normal  PSYCH: mentation appears normal, affect normal/bright    Diagnostic Test Results:  Labs reviewed in Epic  Results for orders placed or performed in visit on 12/30/20 (from the past 24 hour(s))   Hemoglobin A1c   Result Value Ref Range    Hemoglobin A1C 5.3 0 - 5.6 %   CBC with platelets   Result Value Ref Range    WBC 5.2 4.0 - 11.0 10e9/L    RBC Count 4.22 3.8 - 5.2 10e12/L    Hemoglobin 12.5 11.7 - 15.7 g/dL    Hematocrit 39.5 35.0 - 47.0 %    MCV 94 78 - 100 fl    MCH 29.6 26.5 - 33.0 pg    MCHC 31.6 31.5 - 36.5 g/dL    RDW 13.2 10.0 - 15.0 %    Platelet Count 261 150 - 450 10e9/L       ASSESSMENT / PLAN:   1. Encounter for routine adult health examination without abnormal findings  Discussed diet,calcium,exercise.Went over self breast exam.Thin prep was NOT  done.Eyes and teeth " "UTD.No immunizations needed today.See orders below for tests ordered and screening needed.    - Lipid panel reflex to direct LDL Fasting    2. Family history of diabetes mellitus  Will check as she is due   - Hemoglobin A1c    3. Bronchitis  Has sued for bronchitis in the past but no clear diagnose of asthma , refilled in case she needs it   - albuterol (PROAIR HFA/PROVENTIL HFA/VENTOLIN HFA) 108 (90 Base) MCG/ACT inhaler; Inhale 2 puffs into the lungs every 6 hours as needed for shortness of breath / dyspnea or wheezing  Dispense: 1 Inhaler; Refill: 3    4. Stage 3a chronic kidney disease  She is following up with nephrology on this and has some tests placed in April which are due to be done     5. Encounter for immunization  Got her pneumonia shot   - Pneumococcal vaccine 23 valent PPSV23  (Pneumovax) [00663]    6. CKD (chronic kidney disease) stage 3, GFR 30-59 ml/min  Done today , she will follow up with her nephrologist   - Ferritin  - Iron and iron binding capacity  - CBC with platelets  - Renal panel    7. Anemia of chronic kidney failure, stage 3 (moderate)  As above   - Ferritin  - Iron and iron binding capacity  - CBC with platelets  - Renal panel    8. Iron deficiency anemia, unspecified iron deficiency anemia type  Tests ordered by nephrology   - Ferritin  - Iron and iron binding capacity  - CBC with platelets  - Renal panel    Patient has been advised of split billing requirements and indicates understanding: Yes  COUNSELING:  Reviewed preventive health counseling, as reflected in patient instructions       Regular exercise       Healthy diet/nutrition       Vision screening       Hearing screening       Dental care       Osteoporosis Prevention/Bone Health    Estimated body mass index is 24.92 kg/m  as calculated from the following:    Height as of 9/10/19: 1.74 m (5' 8.5\").    Weight as of 1/10/20: 75.4 kg (166 lb 4.8 oz).        She reports that she has never smoked. She has never used smokeless " tobacco.      Appropriate preventive services were discussed with this patient, including applicable screening as appropriate for cardiovascular disease, diabetes, osteopenia/osteoporosis, and glaucoma.  As appropriate for age/gender, discussed screening for colorectal cancer, prostate cancer, breast cancer, and cervical cancer. Checklist reviewing preventive services available has been given to the patient.    Reviewed patients plan of care and provided an AVS. The Intermediate Care Plan ( asthma action plan, low back pain action plan, and migraine action plan) for Maggi meets the Care Plan requirement. This Care Plan has been established and reviewed with the Patient.    Counseling Resources:  ATP IV Guidelines  Pooled Cohorts Equation Calculator  Breast Cancer Risk Calculator  Breast Cancer: Medication to Reduce Risk  FRAX Risk Assessment  ICSI Preventive Guidelines  Dietary Guidelines for Americans, 2010  USDA's MyPlate  ASA Prophylaxis  Lung CA Screening    Sara Thomas MD  [facility]    Identified Health Risks:

## 2020-12-30 NOTE — PATIENT INSTRUCTIONS
PLEASE CALL TO SCHEDULE YOUR MAMMOGRAM  [facility] [phone]  [facility] [place] [phone]  [facility]   [phone]  Central Scheduling (all locations) [phone]    If you are under/uninsured, we recommend you contact the [facility]. They offer mammograms on a sliding fee charge. You can schedule with them at [phone].

## 2021-01-10 ENCOUNTER — HEALTH MAINTENANCE LETTER (OUTPATIENT)
Age: 66
End: 2021-01-10

## 2021-01-29 ENCOUNTER — ANCILLARY PROCEDURE (OUTPATIENT)
Dept: MAMMOGRAPHY | Facility: CLINIC | Age: 66
End: 2021-01-29
Attending: FAMILY MEDICINE
Payer: MEDICARE

## 2021-01-29 DIAGNOSIS — Z12.31 SCREENING MAMMOGRAM, ENCOUNTER FOR: ICD-10-CM

## 2021-01-29 PROCEDURE — 77067 SCR MAMMO BI INCL CAD: CPT | Performed by: RADIOLOGY

## 2021-04-19 DIAGNOSIS — F33.0 MILD RECURRENT MAJOR DEPRESSION (H): ICD-10-CM

## 2021-04-20 ENCOUNTER — OFFICE VISIT (OUTPATIENT)
Dept: FAMILY MEDICINE | Facility: CLINIC | Age: 66
End: 2021-04-20
Payer: MEDICARE

## 2021-04-20 VITALS
BODY MASS INDEX: 26.18 KG/M2 | WEIGHT: 172.7 LBS | TEMPERATURE: 97 F | OXYGEN SATURATION: 98 % | SYSTOLIC BLOOD PRESSURE: 130 MMHG | HEART RATE: 69 BPM | RESPIRATION RATE: 20 BRPM | HEIGHT: 68 IN | DIASTOLIC BLOOD PRESSURE: 80 MMHG

## 2021-04-20 DIAGNOSIS — F33.0 MILD RECURRENT MAJOR DEPRESSION (H): ICD-10-CM

## 2021-04-20 DIAGNOSIS — T78.40XA ALLERGIC REACTION, INITIAL ENCOUNTER: Primary | ICD-10-CM

## 2021-04-20 DIAGNOSIS — R21 RASH: ICD-10-CM

## 2021-04-20 PROBLEM — N18.30 CKD (CHRONIC KIDNEY DISEASE) STAGE 3, GFR 30-59 ML/MIN (H): Status: RESOLVED | Noted: 2019-09-10 | Resolved: 2021-04-20

## 2021-04-20 PROCEDURE — 99214 OFFICE O/P EST MOD 30 MIN: CPT | Performed by: FAMILY MEDICINE

## 2021-04-20 RX ORDER — PREDNISONE 20 MG/1
TABLET ORAL
Qty: 7 TABLET | Refills: 0 | Status: SHIPPED | OUTPATIENT
Start: 2021-04-20 | End: 2022-12-29

## 2021-04-20 ASSESSMENT — PATIENT HEALTH QUESTIONNAIRE - PHQ9: SUM OF ALL RESPONSES TO PHQ QUESTIONS 1-9: 1

## 2021-04-20 ASSESSMENT — MIFFLIN-ST. JEOR: SCORE: 1370.83

## 2021-04-20 NOTE — PROGRESS NOTES
Assessment & Plan     Allergic reaction, initial encounter  Looks like an allergic type of rash and we have discussed treatment with the pt , since the area of the rash is big , it would not be feasible to do a topical steroid , will do a short course of prednisone , discussed risks and side effects with this medication, to take with food etc , if no improvement or the rash reappears , would need to see an allergist and do the allergy testing , referral given   - ALLERGY/ASTHMA ADULT REFERRAL  - predniSONE (DELTASONE) 20 MG tablet; Take two pills daily for 2 days , then one pill daily x 2 days , then half a tablet daily x 2 days    Mild recurrent major depression (H)  Is currently well controlled on the prozac and no side effects or any issues , doing well .    Rash  As above   - predniSONE (DELTASONE) 20 MG tablet; Take two pills daily for 2 days , then one pill daily x 2 days , then half a tablet daily x 2 days      RTC if no improving or worsening.  Pt is aware  and comfortable with the current plan.        Sara Thomas MD  [facility] is a 66 year old who presents for the following health issues     HPI     Rash  Onset/Duration: x3 days started on April 17th, about ,  around chest first, then spread down abdomen, arms and legs back  itching and raised red bumps , she states that happened after there were people installing a central air conditioning for her and were in the attic the previous day, there was dust and may be mold , not sure but definitely arely .  No sore throat , no fever no chills , no cough , no runny nose   Description  Location: all over   Character: blotchy, raised, red  Itching: moderate  Intensity:  moderate  Progression of Symptoms:  Worsening, spreading   Accompanying signs and symptoms:   Fever: no  Body aches or joint pain: no  Sore throat symptoms: no  Recent cold symptoms: no  History:           Previous episodes of similar rash:  "None  New exposures:  None  Recent travel: no  Exposure to similar rash: no  Precipitating or alleviating factors: recently had new central air put in house last week, also had both Moderna COVID vaccines 2/4/21, 3/4/21  Therapies tried and outcome: calamine lotion and Benadryl/diphenhydramine -  not effective        Review of Systems   Constitutional, HEENT, cardiovascular, pulmonary, GI, , musculoskeletal, neuro, skin, endocrine and psych systems are negative, except as otherwise noted.      Objective    Resp 20   Ht 1.734 m (5' 8.25\")   Wt 78.3 kg (172 lb 11.2 oz)   LMP 08/15/2010   BMI 26.07 kg/m    Body mass index is 26.07 kg/m .  Physical Exam   GENERAL: healthy, alert and no distress  EYES: Eyes grossly normal to inspection, PERRL and conjunctivae and sclerae normal  NECK: no adenopathy, no asymmetry, masses, or scars and thyroid normal to palpation  RESP: lungs clear to auscultation - no rales, rhonchi or wheezes  CV: regular rate and rhythm, normal S1 S2, no S3 or S4, no murmur, click or rub, no peripheral edema and peripheral pulses strong  ABDOMEN: soft, nontender, no hepatosplenomegaly, no masses and bowel sounds normal  MS: no gross musculoskeletal defects noted, no edema  SKIN: no suspicious lesions EXCEPT there is erythematous papules all over her upper chest , neck , abdomen, back, arms and legs     No results found for this or any previous visit (from the past 24 hour(s)).            "

## 2021-10-23 ENCOUNTER — HEALTH MAINTENANCE LETTER (OUTPATIENT)
Age: 66
End: 2021-10-23

## 2022-01-22 NOTE — TELEPHONE ENCOUNTER
Dr. Smith Medication is being filled for 1 time refill only due to:  upcoming appt   Jacinta CONTRERAS RN    Next 5 appointments (look out 90 days)    Feb 08, 2019  3:40 PM CST  PHYSICAL with Sara Thomas MD  [facility] ([facility]) [address] [facility] [id number]  [phone]

## 2022-02-12 ENCOUNTER — HEALTH MAINTENANCE LETTER (OUTPATIENT)
Age: 67
End: 2022-02-12

## 2022-03-17 NOTE — PROGRESS NOTES
"SUBJECTIVE:   Maggi Reynolds is a 66 year old female who presents for Preventive Visit.        Patient has been advised of split billing requirements and indicates understanding: Yes  Are you in the first 12 months of your Medicare coverage?  No    Healthy Habits:     In general, how would you rate your overall health?  Good    Frequency of exercise:  2-3 days/week    Duration of exercise:  30-45 minutes    Do you usually eat at least 4 servings of fruit and vegetables a day, include whole grains    & fiber and avoid regularly eating high fat or \"junk\" foods?  Yes    Taking medications regularly:  Yes    Medication side effects:  None    Ability to successfully perform activities of daily living:  No assistance needed    Home Safety:  No safety concerns identified    Hearing Impairment:  No hearing concerns    In the past 6 months, have you been bothered by leaking of urine?  No    In general, how would you rate your overall mental or emotional health?  Excellent      PHQ-2 Total Score: 0    Additional concerns today:  No    Answers for HPI/ROS submitted by the patient on 3/30/2022  If you checked off any problems, how difficult have these problems made it for you to do your work, take care of things at home, or get along with other people?: Not difficult at all  PHQ9 TOTAL SCORE: 2      Do you feel safe in your environment? No    Have you ever done Advance Care Planning? (For example, a Health Directive, POLST, or a discussion with a medical provider or your loved ones about your wishes): No, advance care planning information given to patient to review.  Patient declined advance care planning discussion at this time.      1) stiff neck , right ear, three or four week, no injury , was at the dentist on Monday and was told to wear mouth guard , tight neck , more stress out recently   2) low back pain ,same recently   Fall risk       Cognitive Screening   1) Repeat 3 items (Leader, Season, Table)    2) Clock " draw: NORMAL  3) 3 item recall: Recalls 2 objects   Results: NORMAL clock, 1-2 items recalled: COGNITIVE IMPAIRMENT LESS LIKELY    Mini-CogTM Copyright PROSPER Webb. Licensed by the author for use in [facility]; reprinted with permission (nataliia@[facility]). All rights reserved.      Do you have sleep apnea, excessive snoring or daytime drowsiness?: no    Reviewed and updated as needed this visit by clinical staff                  Reviewed and updated as needed this visit by Provider                 Social History     Tobacco Use     Smoking status: Never Smoker     Smokeless tobacco: Never Used   Substance Use Topics     Alcohol use: Yes     Alcohol/week: 0.0 standard drinks     Comment: 1 glass wine per month     If you drink alcohol do you typically have >3 drinks per day or >7 drinks per week? No    Alcohol Use 3/30/2022   Prescreen: >3 drinks/day or >7 drinks/week? No   Prescreen: >3 drinks/day or >7 drinks/week? -   No flowsheet data found.        Ear pain right ear, stiff neck, low back pain    Current providers sharing in care for this patient include:   Patient Care Team:  Sara Thomas MD as PCP - General (Family Practice)  Sara Thomas MD as Assigned PCP  Siomara Salcedo MD as MD (Nephrology)  Coco Willard, RN as Specialty Care Coordinator (Nephrology)    The following health maintenance items are reviewed in Epic and correct as of today:  Health Maintenance Due   Topic Date Due     ANNUAL REVIEW OF HM ORDERS  Never done     ADVANCE CARE PLANNING  10/16/2017     DEXA  05/20/2019     MICROALBUMIN  04/10/2020     BMP  03/30/2021     HEMOGLOBIN  06/30/2021     LIPID  12/30/2021     FALL RISK ASSESSMENT  12/30/2021     MAMMO SCREENING  01/29/2022     ZOSTER IMMUNIZATION (2 of 2) 04/11/2022     Lab work is in process  Labs reviewed in EPIC  BP Readings from Last 3 Encounters:   03/30/22 124/78   04/20/21 130/80   12/30/20 131/75    Wt Readings from Last 3 Encounters:   03/30/22 77.8 kg (171 lb 8  oz)   04/20/21 78.3 kg (172 lb 11.2 oz)   12/30/20 78 kg (172 lb)                  Patient Active Problem List   Diagnosis     Disorder of bone and cartilage     Disease of lung     Mild recurrent major depression (H)     CARDIOVASCULAR SCREENING; LDL GOAL LESS THAN 160     Hypertension goal BP (blood pressure) < 140/90     Advanced directives, counseling/discussion     Discharge from left nipple     Cervical lymphadenopathy     Elevated serum creatinine     Benign essential hypertension     Past Surgical History:   Procedure Laterality Date     DILATION AND CURETTAGE  8/21/2013    Procedure: DILATION AND CURETTAGE;;  Surgeon: Tamar Walsh MD;  Location: [facility] OR     LAPAROSCOPIC SALPINGO-OOPHORECTOMY  8/21/2013    Procedure: LAPAROSCOPIC SALPINGO-OOPHORECTOMY;  Laparoscopic Bilateral Salpingo-Oophorectomy, Pelvic washings, Dilation and Curettage;  Surgeon: Tamar Walsh MD;  Location: [facility] OR     LUMPECTOMY BREAST Bilateral 5/31/2016    Procedure: LUMPECTOMY BREAST;  Surgeon: Barney Givens MD;  Location:  OR       Social History     Tobacco Use     Smoking status: Never Smoker     Smokeless tobacco: Never Used   Substance Use Topics     Alcohol use: Yes     Alcohol/week: 0.0 standard drinks     Comment: 1 glass wine per month     Family History   Problem Relation Age of Onset     Arthritis Mother      Osteoporosis Mother         at age 75     Heart Disease Mother         arhythmia     Allergies Mother         seasonal     Alzheimer Disease Mother         at age 81     C.A.D. Father         bypass at age 78 and Pace maker at age 90     Diabetes Father         type 2     Heart Disease Father      Gastrointestinal Disease Father         ulcers     Coronary Artery Disease Father      Breast Cancer Maternal Grandmother         at age 40's     Cerebrovascular Disease Maternal Grandfather         at age 70's     C.A.D. Paternal Grandmother         congestive heart failure     Respiratory Brother          bronchiestis     Coronary Artery Disease Brother          Current Outpatient Medications   Medication Sig Dispense Refill     FLUoxetine (PROZAC) 20 MG capsule Take three capsules daily 270 capsule 0     albuterol (PROAIR HFA/PROVENTIL HFA/VENTOLIN HFA) 108 (90 Base) MCG/ACT inhaler Inhale 2 puffs into the lungs every 6 hours as needed for shortness of breath / dyspnea or wheezing 1 Inhaler 3     methylphenidate (RITALIN) 10 MG tablet Take 10 mg by mouth 2 times daily. 1.5 in AM, 1.5 NOON, 20mg in PM if needed.       predniSONE (DELTASONE) 20 MG tablet Take two pills daily for 2 days , then one pill daily x 2 days , then half a tablet daily x 2 days 7 tablet 0     REFRESH 1 % OP SOLN as needed 1 0     Allergies   Allergen Reactions     No Known Allergies      Recent Labs   Lab Test 03/30/22  0942 12/30/20  0809 01/10/20  0741 11/27/19  0835 09/10/19  1430 01/16/19  1422 10/31/17  1025 03/31/16  1152   A1C  --  5.3  --   --   --   --  5.2  --    * 94  --   --  124*  --  85 128*    120  --   --  95  --  111 135   TRIG 59 64  --   --  57  --  56 59   ALT 26  --   --   --  25  --  47  --    CR 1.14* 1.08* 1.28*   < > 1.09* 1.29* 1.25*  --    GFRESTIMATED 53* 53* 44*   < > 53* 44* 43*  --    GFRESTBLACK  --  62 51*   < > 62 50* 52*  --    POTASSIUM 4.8 4.8 4.5   < > 4.4 4.4 4.6  --    TSH  --   --   --   --   --  2.22  --  1.79    < > = values in this interval not displayed.      Mammogram Screening: Mammogram Screening: Recommended mammography every 1-2 years with patient discussion and risk factor consideration  Any new diagnosis of family breast, ovarian, or bowel cancer? No    FHS-7: No flowsheet data found.    Mammogram Screening: Recommended annual mammography  Pertinent mammograms are reviewed under the imaging tab.    Review of Systems   Constitutional: Negative for chills and fever.   HENT: Positive for ear pain. Negative for congestion, hearing loss and sore throat.    Eyes: Negative for pain  "and visual disturbance.   Respiratory: Negative for cough and shortness of breath.    Cardiovascular: Negative for chest pain, palpitations and peripheral edema.   Gastrointestinal: Negative for abdominal pain, constipation, diarrhea, heartburn, hematochezia and nausea.   Breasts:  Negative for tenderness, breast mass and discharge.   Genitourinary: Positive for urgency. Negative for dysuria, frequency, genital sores, hematuria, pelvic pain, vaginal bleeding and vaginal discharge.   Musculoskeletal: Negative for arthralgias, joint swelling and myalgias.   Skin: Negative for rash.   Neurological: Negative for dizziness, weakness, headaches and paresthesias.   Psychiatric/Behavioral: Negative for mood changes. The patient is not nervous/anxious.      Constitutional, HEENT, cardiovascular, pulmonary, GI, , musculoskeletal, neuro, skin, endocrine and psych systems are negative, except as otherwise noted.    OBJECTIVE:   LMP 08/15/2010  Estimated body mass index is 26.07 kg/m  as calculated from the following:    Height as of 4/20/21: 1.734 m (5' 8.25\").    Weight as of 4/20/21: 78.3 kg (172 lb 11.2 oz).  Physical Exam  GENERAL: healthy, alert and no distress  EYES: Eyes grossly normal to inspection, PERRL and conjunctivae and sclerae normal  HENT: ear canals and TM's normal, nose and mouth without ulcers or lesions  NECK: no adenopathy, no asymmetry, masses, or scars and thyroid normal to palpation  RESP: lungs clear to auscultation - no rales, rhonchi or wheezes  BREAST: normal without masses, tenderness or nipple discharge and no palpable axillary masses or adenopathy  CV: regular rate and rhythm, normal S1 S2, no S3 or S4, no murmur, click or rub, no peripheral edema and peripheral pulses strong  ABDOMEN: soft, nontender, no hepatosplenomegaly, no masses and bowel sounds normal  MS: no gross musculoskeletal defects noted, no edema  SKIN: no suspicious lesions or rashes  NEURO: Normal strength and tone, mentation " intact and speech normal  PSYCH: mentation appears normal, affect normal/bright    Diagnostic Test Results:  Labs reviewed in Epic  Results for orders placed or performed in visit on 03/30/22   Albumin Random Urine Quantitative with Creat Ratio     Status: None   Result Value Ref Range    Creatinine Urine mg/dL 223 mg/dL    Albumin Urine mg/L 18 mg/L    Albumin Urine mg/g Cr 8.07 0.00 - 25.00 mg/g Cr   Lipid panel reflex to direct LDL Fasting     Status: Abnormal   Result Value Ref Range    Cholesterol 226 (H) <200 mg/dL    Triglycerides 59 <150 mg/dL    Direct Measure  >=50 mg/dL    LDL Cholesterol Calculated 105 (H) <=100 mg/dL    Non HDL Cholesterol 117 <130 mg/dL    Patient Fasting > 8hrs? Yes     Narrative    Cholesterol  Desirable:  <200 mg/dL    Triglycerides  Normal:  Less than 150 mg/dL  Borderline High:  150-199 mg/dL  High:  200-499 mg/dL  Very High:  Greater than or equal to 500 mg/dL    Direct Measure HDL  Female:  Greater than or equal to 50 mg/dL   Male:  Greater than or equal to 40 mg/dL    LDL Cholesterol  Desirable:  <100mg/dL  Above Desirable:  100-129 mg/dL   Borderline High:  130-159 mg/dL   High:  160-189 mg/dL   Very High:  >= 190 mg/dL    Non HDL Cholesterol  Desirable:  130 mg/dL  Above Desirable:  130-159 mg/dL  Borderline High:  160-189 mg/dL  High:  190-219 mg/dL  Very High:  Greater than or equal to 220 mg/dL   CBC with platelets     Status: Normal   Result Value Ref Range    WBC Count 5.2 4.0 - 11.0 10e3/uL    RBC Count 4.00 3.80 - 5.20 10e6/uL    Hemoglobin 12.1 11.7 - 15.7 g/dL    Hematocrit 37.5 35.0 - 47.0 %    MCV 94 78 - 100 fL    MCH 30.3 26.5 - 33.0 pg    MCHC 32.3 31.5 - 36.5 g/dL    RDW 13.2 10.0 - 15.0 %    Platelet Count 260 150 - 450 10e3/uL   Comprehensive metabolic panel (BMP + Alb, Alk Phos, ALT, AST, Total. Bili, TP)     Status: Abnormal   Result Value Ref Range    Sodium 139 133 - 144 mmol/L    Potassium 4.8 3.4 - 5.3 mmol/L    Chloride 107 94 - 109 mmol/L     Carbon Dioxide (CO2) 29 20 - 32 mmol/L    Anion Gap 3 3 - 14 mmol/L    Urea Nitrogen 24 7 - 30 mg/dL    Creatinine 1.14 (H) 0.52 - 1.04 mg/dL    Calcium 8.6 8.5 - 10.1 mg/dL    Glucose 91 70 - 99 mg/dL    Alkaline Phosphatase 87 40 - 150 U/L    AST 20 0 - 45 U/L    ALT 26 0 - 50 U/L    Protein Total 7.2 6.8 - 8.8 g/dL    Albumin 3.5 3.4 - 5.0 g/dL    Bilirubin Total 1.1 0.2 - 1.3 mg/dL    GFR Estimate 53 (L) >60 mL/min/1.73m2       ASSESSMENT / PLAN:   (Z00.00) Encounter for routine adult health examination without abnormal findings  (primary encounter diagnosis)  Comment: Discussed diet,calcium,exercise.Went over self breast exam.Thin prep was  NOT done.Eyes and teeth UTD.No immunizations needed today.See orders below for tests ordered and screening needed.    Plan: REVIEW OF HEALTH MAINTENANCE PROTOCOL ORDERS,         Lipid panel reflex to direct LDL Fasting            (N18.31) Stage 3a chronic kidney disease (H)  Comment: she is due for follow up with nephrology but also will check labs today   Plan: Albumin Random Urine Quantitative with Creat         Ratio, Comprehensive metabolic panel (BMP +         Alb, Alk Phos, ALT, AST, Total. Bili, TP)        Will be informed about the results     (F33.0) Mild recurrent major depression (H)  Comment: she is doing well on this , no side effects and seems that depression is under control , refilled   Plan: FLUoxetine (PROZAC) 20 MG capsule            (I10) Benign essential hypertension  Comment: BP has been well controlled , will check labs today   Plan: Albumin Random Urine Quantitative with Creat         Ratio, Comprehensive metabolic panel (BMP +         Alb, Alk Phos, ALT, AST, Total. Bili, TP)        She has been off her anti HTN meds for a while now will check kidney function     (D50.9) Iron deficiency anemia, unspecified iron deficiency anemia type  Comment: will check today   Plan: CBC with platelets        Had anemia in the past       Patient has been advised of  "split billing requirements and indicates understanding: Yes    COUNSELING:  Reviewed preventive health counseling, as reflected in patient instructions       Regular exercise       Healthy diet/nutrition       Vision screening       Hearing screening       Dental care       Bladder control       Osteoporosis prevention/bone health    Estimated body mass index is 26.07 kg/m  as calculated from the following:    Height as of 4/20/21: 1.734 m (5' 8.25\").    Weight as of 4/20/21: 78.3 kg (172 lb 11.2 oz).        She reports that she has never smoked. She has never used smokeless tobacco.      Appropriate preventive services were discussed with this patient, including applicable screening as appropriate for cardiovascular disease, diabetes, osteopenia/osteoporosis, and glaucoma.  As appropriate for age/gender, discussed screening for colorectal cancer, prostate cancer, breast cancer, and cervical cancer. Checklist reviewing preventive services available has been given to the patient.    Reviewed patients plan of care and provided an AVS. The Intermediate Care Plan ( asthma action plan, low back pain action plan, and migraine action plan) for Maggi meets the Care Plan requirement. This Care Plan has been established and reviewed with the Patient.    Counseling Resources:  ATP IV Guidelines  Pooled Cohorts Equation Calculator  Breast Cancer Risk Calculator  Breast Cancer: Medication to Reduce Risk  FRAX Risk Assessment  ICSI Preventive Guidelines  Dietary Guidelines for Americans, 2010  YOHO's MyPlate  ASA Prophylaxis  Lung CA Screening    Sara Thomas MD  [facility]    Identified Health Risks:  "

## 2022-03-30 ENCOUNTER — OFFICE VISIT (OUTPATIENT)
Dept: FAMILY MEDICINE | Facility: CLINIC | Age: 67
End: 2022-03-30
Payer: MEDICARE

## 2022-03-30 VITALS
DIASTOLIC BLOOD PRESSURE: 78 MMHG | HEART RATE: 70 BPM | HEIGHT: 68 IN | OXYGEN SATURATION: 98 % | SYSTOLIC BLOOD PRESSURE: 124 MMHG | BODY MASS INDEX: 25.99 KG/M2 | WEIGHT: 171.5 LBS | TEMPERATURE: 97.3 F

## 2022-03-30 DIAGNOSIS — Z00.00 ENCOUNTER FOR ROUTINE ADULT HEALTH EXAMINATION WITHOUT ABNORMAL FINDINGS: Primary | ICD-10-CM

## 2022-03-30 DIAGNOSIS — F33.0 MILD RECURRENT MAJOR DEPRESSION (H): ICD-10-CM

## 2022-03-30 DIAGNOSIS — I10 BENIGN ESSENTIAL HYPERTENSION: ICD-10-CM

## 2022-03-30 DIAGNOSIS — N18.31 STAGE 3A CHRONIC KIDNEY DISEASE (H): ICD-10-CM

## 2022-03-30 DIAGNOSIS — D50.9 IRON DEFICIENCY ANEMIA, UNSPECIFIED IRON DEFICIENCY ANEMIA TYPE: ICD-10-CM

## 2022-03-30 LAB
ERYTHROCYTE [DISTWIDTH] IN BLOOD BY AUTOMATED COUNT: 13.2 % (ref 10–15)
HCT VFR BLD AUTO: 37.5 % (ref 35–47)
HGB BLD-MCNC: 12.1 G/DL (ref 11.7–15.7)
MCH RBC QN AUTO: 30.3 PG (ref 26.5–33)
MCHC RBC AUTO-ENTMCNC: 32.3 G/DL (ref 31.5–36.5)
MCV RBC AUTO: 94 FL (ref 78–100)
PLATELET # BLD AUTO: 260 10E3/UL (ref 150–450)
RBC # BLD AUTO: 4 10E6/UL (ref 3.8–5.2)
WBC # BLD AUTO: 5.2 10E3/UL (ref 4–11)

## 2022-03-30 PROCEDURE — 85027 COMPLETE CBC AUTOMATED: CPT | Performed by: FAMILY MEDICINE

## 2022-03-30 PROCEDURE — G0439 PPPS, SUBSEQ VISIT: HCPCS | Performed by: FAMILY MEDICINE

## 2022-03-30 PROCEDURE — 80061 LIPID PANEL: CPT | Performed by: FAMILY MEDICINE

## 2022-03-30 PROCEDURE — 99214 OFFICE O/P EST MOD 30 MIN: CPT | Mod: 25 | Performed by: FAMILY MEDICINE

## 2022-03-30 PROCEDURE — 80053 COMPREHEN METABOLIC PANEL: CPT | Performed by: FAMILY MEDICINE

## 2022-03-30 PROCEDURE — 36415 COLL VENOUS BLD VENIPUNCTURE: CPT | Performed by: FAMILY MEDICINE

## 2022-03-30 PROCEDURE — 82043 UR ALBUMIN QUANTITATIVE: CPT | Performed by: FAMILY MEDICINE

## 2022-03-30 ASSESSMENT — ENCOUNTER SYMPTOMS
ABDOMINAL PAIN: 0
SHORTNESS OF BREATH: 0
DYSURIA: 0
PARESTHESIAS: 0
JOINT SWELLING: 0
HEARTBURN: 0
COUGH: 0
BREAST MASS: 0
WEAKNESS: 0
NAUSEA: 0
FREQUENCY: 0
DIARRHEA: 0
EYE PAIN: 0
SORE THROAT: 0
HEMATURIA: 0
FEVER: 0
PALPITATIONS: 0
DIZZINESS: 0
CHILLS: 0
CONSTIPATION: 0
MYALGIAS: 0
NERVOUS/ANXIOUS: 0
ARTHRALGIAS: 0
HEMATOCHEZIA: 0
HEADACHES: 0

## 2022-03-30 ASSESSMENT — ACTIVITIES OF DAILY LIVING (ADL): CURRENT_FUNCTION: NO ASSISTANCE NEEDED

## 2022-03-30 ASSESSMENT — PATIENT HEALTH QUESTIONNAIRE - PHQ9
SUM OF ALL RESPONSES TO PHQ QUESTIONS 1-9: 2
SUM OF ALL RESPONSES TO PHQ QUESTIONS 1-9: 2
10. IF YOU CHECKED OFF ANY PROBLEMS, HOW DIFFICULT HAVE THESE PROBLEMS MADE IT FOR YOU TO DO YOUR WORK, TAKE CARE OF THINGS AT HOME, OR GET ALONG WITH OTHER PEOPLE: NOT DIFFICULT AT ALL

## 2022-03-30 NOTE — PATIENT INSTRUCTIONS
PLEASE CALL TO SCHEDULE YOUR MAMMOGRAM  [facility] [phone]  [facility] [place] [phone]  [facility]   [phone]  Central Scheduling (all locations) [phone]    If you are under/uninsured, we recommend you contact the [facility]. They offer mammograms on a sliding fee charge. You can schedule with them at [phone].

## 2022-03-31 LAB
ALBUMIN SERPL-MCNC: 3.5 G/DL (ref 3.4–5)
ALP SERPL-CCNC: 87 U/L (ref 40–150)
ALT SERPL W P-5'-P-CCNC: 26 U/L (ref 0–50)
ANION GAP SERPL CALCULATED.3IONS-SCNC: 3 MMOL/L (ref 3–14)
AST SERPL W P-5'-P-CCNC: 20 U/L (ref 0–45)
BILIRUB SERPL-MCNC: 1.1 MG/DL (ref 0.2–1.3)
BUN SERPL-MCNC: 24 MG/DL (ref 7–30)
CALCIUM SERPL-MCNC: 8.6 MG/DL (ref 8.5–10.1)
CHLORIDE BLD-SCNC: 107 MMOL/L (ref 94–109)
CHOLEST SERPL-MCNC: 226 MG/DL
CO2 SERPL-SCNC: 29 MMOL/L (ref 20–32)
CREAT SERPL-MCNC: 1.14 MG/DL (ref 0.52–1.04)
CREAT UR-MCNC: 223 MG/DL
FASTING STATUS PATIENT QL REPORTED: YES
GFR SERPL CREATININE-BSD FRML MDRD: 53 ML/MIN/1.73M2
GLUCOSE BLD-MCNC: 91 MG/DL (ref 70–99)
HDLC SERPL-MCNC: 109 MG/DL
LDLC SERPL CALC-MCNC: 105 MG/DL
MICROALBUMIN UR-MCNC: 18 MG/L
MICROALBUMIN/CREAT UR: 8.07 MG/G CR (ref 0–25)
NONHDLC SERPL-MCNC: 117 MG/DL
POTASSIUM BLD-SCNC: 4.8 MMOL/L (ref 3.4–5.3)
PROT SERPL-MCNC: 7.2 G/DL (ref 6.8–8.8)
SODIUM SERPL-SCNC: 139 MMOL/L (ref 133–144)
TRIGL SERPL-MCNC: 59 MG/DL

## 2022-04-01 ENCOUNTER — TELEPHONE (OUTPATIENT)
Dept: FAMILY MEDICINE | Facility: CLINIC | Age: 67
End: 2022-04-01
Payer: COMMERCIAL

## 2022-04-01 ENCOUNTER — TELEPHONE (OUTPATIENT)
Dept: NEPHROLOGY | Facility: CLINIC | Age: 67
End: 2022-04-01
Payer: COMMERCIAL

## 2022-04-01 DIAGNOSIS — N18.30 CKD (CHRONIC KIDNEY DISEASE) STAGE 3, GFR 30-59 ML/MIN (H): Primary | ICD-10-CM

## 2022-04-01 NOTE — TELEPHONE ENCOUNTER
[facility] Call Center    Phone Message    May a detailed message be left on voicemail: yes     Reason for Call: Order(s): Other:   Reason for requested: labs  Date needed: 10/12/2022  Provider name: Dr. Salcedo      Action Taken: Message routed to:  [facility] ([facility] Neph    Travel Screening: Not Applicable

## 2022-04-01 NOTE — TELEPHONE ENCOUNTER
It is not urgent , so if they give her an appointment for October that is fine ! The creatinine is just mildly elevated   Can you let her know this ?  Thanks

## 2022-04-01 NOTE — TELEPHONE ENCOUNTER
Triage,   Maggi called.   States she tried to schedule an appt with nephrology and the next available appt is October 2022.   Patient is wondering if she needs to be seen sooner?  Please advise.  Thanks!  Sandie MATT

## 2022-04-09 ENCOUNTER — HEALTH MAINTENANCE LETTER (OUTPATIENT)
Age: 67
End: 2022-04-09

## 2022-04-12 ENCOUNTER — ANCILLARY PROCEDURE (OUTPATIENT)
Dept: MAMMOGRAPHY | Facility: CLINIC | Age: 67
End: 2022-04-12
Attending: FAMILY MEDICINE
Payer: MEDICARE

## 2022-04-12 DIAGNOSIS — Z12.31 VISIT FOR SCREENING MAMMOGRAM: ICD-10-CM

## 2022-04-12 PROCEDURE — 77067 SCR MAMMO BI INCL CAD: CPT | Performed by: STUDENT IN AN ORGANIZED HEALTH CARE EDUCATION/TRAINING PROGRAM

## 2022-04-12 PROCEDURE — 77063 BREAST TOMOSYNTHESIS BI: CPT | Performed by: STUDENT IN AN ORGANIZED HEALTH CARE EDUCATION/TRAINING PROGRAM

## 2022-05-11 ENCOUNTER — LAB (OUTPATIENT)
Dept: LAB | Facility: CLINIC | Age: 67
End: 2022-05-11
Payer: MEDICARE

## 2022-05-11 ENCOUNTER — OFFICE VISIT (OUTPATIENT)
Dept: NEPHROLOGY | Facility: CLINIC | Age: 67
End: 2022-05-11
Attending: INTERNAL MEDICINE
Payer: MEDICARE

## 2022-05-11 VITALS
SYSTOLIC BLOOD PRESSURE: 174 MMHG | DIASTOLIC BLOOD PRESSURE: 82 MMHG | OXYGEN SATURATION: 100 % | HEART RATE: 69 BPM | BODY MASS INDEX: 26.3 KG/M2 | WEIGHT: 175 LBS

## 2022-05-11 DIAGNOSIS — N18.30 CKD (CHRONIC KIDNEY DISEASE) STAGE 3, GFR 30-59 ML/MIN (H): ICD-10-CM

## 2022-05-11 DIAGNOSIS — N18.31 CHRONIC KIDNEY DISEASE, STAGE 3A (H): Primary | ICD-10-CM

## 2022-05-11 LAB
ALBUMIN SERPL-MCNC: 3.6 G/DL (ref 3.4–5)
ANION GAP SERPL CALCULATED.3IONS-SCNC: 5 MMOL/L (ref 3–14)
BUN SERPL-MCNC: 20 MG/DL (ref 7–30)
CALCIUM SERPL-MCNC: 9.4 MG/DL (ref 8.5–10.1)
CHLORIDE BLD-SCNC: 106 MMOL/L (ref 94–109)
CO2 SERPL-SCNC: 29 MMOL/L (ref 20–32)
CREAT SERPL-MCNC: 1.03 MG/DL (ref 0.52–1.04)
CREAT UR-MCNC: 11 MG/DL
GFR SERPL CREATININE-BSD FRML MDRD: 59 ML/MIN/1.73M2
GLUCOSE BLD-MCNC: 98 MG/DL (ref 70–99)
HGB BLD-MCNC: 11.8 G/DL (ref 11.7–15.7)
PHOSPHATE SERPL-MCNC: 3.1 MG/DL (ref 2.5–4.5)
POTASSIUM BLD-SCNC: 4.6 MMOL/L (ref 3.4–5.3)
PROT UR-MCNC: <0.05 G/L
PROT/CREAT 24H UR: NORMAL MG/G{CREAT}
PTH-INTACT SERPL-MCNC: 51 PG/ML (ref 15–65)
SODIUM SERPL-SCNC: 140 MMOL/L (ref 133–144)

## 2022-05-11 PROCEDURE — 99214 OFFICE O/P EST MOD 30 MIN: CPT | Performed by: INTERNAL MEDICINE

## 2022-05-11 PROCEDURE — 99000 SPECIMEN HANDLING OFFICE-LAB: CPT | Performed by: PATHOLOGY

## 2022-05-11 PROCEDURE — G0463 HOSPITAL OUTPT CLINIC VISIT: HCPCS

## 2022-05-11 PROCEDURE — 80069 RENAL FUNCTION PANEL: CPT | Performed by: PATHOLOGY

## 2022-05-11 PROCEDURE — 36415 COLL VENOUS BLD VENIPUNCTURE: CPT | Performed by: PATHOLOGY

## 2022-05-11 PROCEDURE — 83970 ASSAY OF PARATHORMONE: CPT | Performed by: PATHOLOGY

## 2022-05-11 PROCEDURE — 82306 VITAMIN D 25 HYDROXY: CPT | Performed by: INTERNAL MEDICINE

## 2022-05-11 PROCEDURE — 85018 HEMOGLOBIN: CPT | Performed by: PATHOLOGY

## 2022-05-11 PROCEDURE — 84156 ASSAY OF PROTEIN URINE: CPT | Performed by: PATHOLOGY

## 2022-05-11 ASSESSMENT — PAIN SCALES - GENERAL: PAINLEVEL: NO PAIN (0)
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
## 2022-05-11 NOTE — NURSING NOTE
Chief Complaint   Patient presents with     RECHECK     Kidney follow up      Blood pressure (!) 174/82, pulse 69, weight 79.4 kg (175 lb), last menstrual period 08/15/2010, SpO2 100 %, not currently breastfeeding.    Patient reporting normal range BP's at home.    Micaela Barbosa, CMA

## 2022-05-16 LAB
DEPRECATED CALCIDIOL+CALCIFEROL SERPL-MC: <30 UG/L (ref 20–75)
VITAMIN D2 SERPL-MCNC: <5 UG/L
VITAMIN D3 SERPL-MCNC: 25 UG/L

## 2022-10-09 ENCOUNTER — HEALTH MAINTENANCE LETTER (OUTPATIENT)
Age: 67
End: 2022-10-09

## 2022-10-10 DIAGNOSIS — N18.31 CHRONIC KIDNEY DISEASE, STAGE 3A (H): Primary | ICD-10-CM

## 2022-12-19 DIAGNOSIS — F33.0 MILD RECURRENT MAJOR DEPRESSION (H): ICD-10-CM

## 2022-12-29 ENCOUNTER — VIRTUAL VISIT (OUTPATIENT)
Dept: FAMILY MEDICINE | Facility: CLINIC | Age: 67
End: 2022-12-29
Payer: MEDICARE

## 2022-12-29 DIAGNOSIS — U07.1 INFECTION DUE TO 2019 NOVEL CORONAVIRUS: Primary | ICD-10-CM

## 2022-12-29 DIAGNOSIS — I10 BENIGN ESSENTIAL HYPERTENSION: ICD-10-CM

## 2022-12-29 DIAGNOSIS — N18.31 CHRONIC KIDNEY DISEASE, STAGE 3A (H): ICD-10-CM

## 2022-12-29 PROCEDURE — 99441 PR PHYSICIAN TELEPHONE EVALUATION 5-10 MIN: CPT | Mod: CS | Performed by: PHYSICIAN ASSISTANT

## 2022-12-29 NOTE — PROGRESS NOTES
"Maggi is a 67 year old who is being evaluated via a billable telephone visit.      What phone number would you like to be contacted at? [phone]  How would you like to obtain your AVS? Anna  Distant Location (provider location):  On-site    Assessment & Plan     Infection due to 2019 novel coronavirus  Higher risk due to age and PMH  - nirmatrelvir and ritonavir (PAXLOVID) therapy pack; Take 2 tablets by mouth 2 times daily for 5 days (Take one tablet of Nirmatelvir and 1 tablet of Ritonavir twice daily for 5 days)    Chronic kidney disease, stage 3a (H)  Kidney function has been improving with recent rechecks, last was in May and had GFR of 59, so will still renally dose    Benign essential hypertension  Has been running good at home.               BMI:   Estimated body mass index is 26.3 kg/m  as calculated from the following:    Height as of 3/30/22: 1.737 m (5' 8.4\").    Weight as of 5/11/22: 79.4 kg (175 lb).           No follow-ups on file.    Paco Moser PA-C  [facility]    Subjective   Maggi is a 67 year old, presenting for the following health issues:  Covid Concern      HPI       COVID-19 Symptom Review  How many days ago did these symptoms start? yesterday    Are any of the following symptoms significant for you?    New or worsening difficulty breathing? No    Worsening cough? Yes, I am coughing up mucus.    Fever or chills? Yes, I felt feverish or had chills.    Headache: YES    Sore throat: YES,ear pain    Chest pain: No    Diarrhea: No    Body aches? YES    What treatments has patient tried? naprosyn   Does patient live in a nursing home, group home, or shelter? No  Does patient have a way to get food/medications during quarantined? Yes, I have a friend or family member who can help me.            Review of Systems   Constitutional, HEENT, cardiovascular, pulmonary, gi and gu systems are negative, except as otherwise noted.      Objective    Vitals - Patient " Reported  Systolic (Patient Reported): 118  Diastolic (Patient Reported): 72  Weight (Patient Reported): 71.7 kg (158 lb)  Temperature (Patient Reported): 98  F (36.7  C)      Vitals:  No vitals were obtained today due to virtual visit.    Physical Exam   alert and no distress  PSYCH: Alert and oriented times 3; coherent speech, normal   rate and volume, able to articulate logical thoughts, able   to abstract reason, no tangential thoughts, no hallucinations   or delusions  Her affect is normal  RESP: No cough, no audible wheezing, able to talk in full sentences  Remainder of exam unable to be completed due to telephone visits                Phone call duration: 9 minutes

## 2022-12-29 NOTE — PATIENT INSTRUCTIONS
COVID-19 Outpatient Treatments  Your care team can help you find the best treatments for COVID-19. Talk to a health care provider or refer to the FDA medicine fact sheets below.    Important: You can't have Paxlovid or molnupiravir if you're starting the medicine more than 5 days after your symptoms have started.  Paxlovid: https://www.fda.gov/media/620048/download  Molnupiravir (Lagevrio): https://www.fda.gov/media/333279/download  Paxlovid (nimatrelvir and ritonavir)  How it works  Two medicines (nirmatrelvir and ritonavir) are taken together. They stop the virus from growing. Less amount of virus is easier for your body to fight.  Benefits  Lowers risk of a hospital stay or death from COVID-19.  How to take    Medicine comes in a daily container with both medicine tablets. Take by mouth twice daily (once in the morning, once at night) for 5 days.    The number of tablets to take varies by patient.    Don't chew or break capsules. Swallow whole.  When to take  Take as soon as possible after positive COVID-19 test result, and within 5 days of your first symptoms.  Who can take it  Patients must be 12 years or older, weigh at least 88 pounds, and have tested positive for COVID-19. Paxlovid is the preferred treatment for pregnant patients.  Possible side effects  Can cause altered sense of taste, diarrhea (loose, watery stools), high blood pressure, muscle aches.  Medicine conflicts    Some medicines may conflict with Paxlovid and may cause serious side effects.    Tell your care team about all the medicines you take, including prescription and over-the-counter medicines, vitamins, and herbal supplements.    Your care team will review your medicines to make sure that you can safely take Paxlovid.  Cautions    Paxlovid is not advised for patients with severe kidney or liver disease. If you have kidney or liver problems, the dose may need to be changed.    If you're pregnant or breastfeeding, talk to your care team  about your options.    If you take hormonal birth control (such as the Pill), then you or your partner should also use a non-hormonal form of birth control (such as a condom). Keep doing this for 1 menstrual cycle after your last dose of Paxlovid.  Molnupiravir (Lagevrio)  How it works  Stops the virus from growing. Less amount of virus is easier for your body to fight.  Benefits  Lowers risk of a hospital stay or death from COVID-19.  How to take  Take 4 capsules by mouth every 12 hours (4 in the morning and 4 at night) for 5 days. Don't chew or break capsules. Swallow whole.  When to take  Take as soon as possible after positive COVID-19 test result, and within 5 days of your first symptoms.  Who can take it  Patients must be 18 years or older and have tested positive for COVID-19.  Possible side effects  Diarrhea (loose, watery stools), nausea (feeling sick to your stomach), dizziness, headaches.  Medicine conflicts  Right now, there are no known conflicts with other drugs. But tell your care team about all medicines you take.  Cautions    This medicine is not advised for patients who are pregnant.    If you are someone who could become pregnant, use trusted birth control until 4 days after your last dose of molnupiravir.    If your partner could become pregnant, you should use trusted birth control until 3 months after your last dose of molnupiravir.  For informational purposes only. Not to replace the advice of your health care provider. Copyright   2022 [facility]. All rights reserved. Clinically reviewed by Mechelle Hurtado, PharmD, BCACP. [email or web address] [id number] - REV 12/22.

## 2023-01-02 ENCOUNTER — NURSE TRIAGE (OUTPATIENT)
Dept: NURSING | Facility: CLINIC | Age: 68
End: 2023-01-02

## 2023-01-02 NOTE — TELEPHONE ENCOUNTER
The patient is inquiring when she can get the Covid-19 booster vaccine    The patient is currently ill with Covid-19 and is on Plaxlovid antiviral treatment    Triage guidelines recommend to home care    Caller verbalized and understands directives      Reason for Disposition    COVID-19 vaccine, Frequently Asked Questions (FAQs)    Protocols used: CORONAVIRUS (COVID-19) VACCINE QUESTIONS AND [id number]

## 2023-01-03 ENCOUNTER — TELEPHONE (OUTPATIENT)
Dept: FAMILY MEDICINE | Facility: CLINIC | Age: 68
End: 2023-01-03

## 2023-01-03 NOTE — TELEPHONE ENCOUNTER
Patient called  Accidentally took only 2 tablets a day of paxlovid instead of the prescribed dose of 4 tablets daily  Is currently 5th and final day of treatment  Confirmed with [facility] at [facility]  Patient is to dispose of the remainder of medication at this time  Advised rebound effect could occur  Do not take remainder of paxlovid if rebound occurs  Verbalized understanding  Will call back or seek care if symptoms worsen   Emilee MONROY RN

## 2023-01-18 DIAGNOSIS — F33.0 MILD RECURRENT MAJOR DEPRESSION (H): ICD-10-CM

## 2023-03-14 DIAGNOSIS — F33.0 MILD RECURRENT MAJOR DEPRESSION (H): ICD-10-CM

## 2023-03-15 NOTE — TELEPHONE ENCOUNTER
"Requested Prescriptions   Pending Prescriptions Disp Refills     FLUoxetine (PROZAC) 20 MG capsule [Pharmacy Med Name: FLUOXETINE HCL 20 MG CAPSULE] 90 capsule 0     Sig: TAKE 3 CAPSULES BY MOUTH EVERY DAY       SSRIs Protocol Failed - 3/14/2023  8:31 AM        Failed - PHQ-9 score less than 5 in past 6 months     Please review last PHQ-9 score.           Passed - Medication is active on med list        Passed - Patient is age 18 or older        Passed - No active pregnancy on record        Passed - No positive pregnancy test in last 12 months        Passed - Recent (6 mo) or future (30 days) visit within the authorizing provider's specialty     Patient had office visit in the last 6 months or has a visit in the next 30 days with authorizing provider or within the authorizing provider's specialty.  See \"Patient Info\" tab in inbasket, or \"Choose Columns\" in Meds & Orders section of the refill encounter.               Patient due for PHQ-9 questionnaire, and will be due for annual visit at end of March. Mychart sent to schedule annual and complete PHQ-9.    Thanks!  Alireza Rutledge RN   [facility]    "

## 2023-04-20 ENCOUNTER — PATIENT OUTREACH (OUTPATIENT)
Dept: CARE COORDINATION | Facility: CLINIC | Age: 68
End: 2023-04-20
Payer: MEDICARE

## 2023-05-03 ENCOUNTER — ANCILLARY PROCEDURE (OUTPATIENT)
Dept: MAMMOGRAPHY | Facility: CLINIC | Age: 68
End: 2023-05-03
Attending: FAMILY MEDICINE
Payer: MEDICARE

## 2023-05-03 DIAGNOSIS — Z12.31 VISIT FOR SCREENING MAMMOGRAM: ICD-10-CM

## 2023-05-03 PROCEDURE — 77063 BREAST TOMOSYNTHESIS BI: CPT | Mod: GC | Performed by: RADIOLOGY

## 2023-05-03 PROCEDURE — 77067 SCR MAMMO BI INCL CAD: CPT | Mod: GC | Performed by: RADIOLOGY

## 2023-05-25 ASSESSMENT — ACTIVITIES OF DAILY LIVING (ADL): CURRENT_FUNCTION: NO ASSISTANCE NEEDED

## 2023-05-25 ASSESSMENT — ENCOUNTER SYMPTOMS
ABDOMINAL PAIN: 0
HEADACHES: 0
MYALGIAS: 0
CONSTIPATION: 0
SHORTNESS OF BREATH: 0
DYSURIA: 0
BREAST MASS: 0
WEAKNESS: 0
CHILLS: 0
DIARRHEA: 0
NERVOUS/ANXIOUS: 1
HEMATURIA: 0
NAUSEA: 0
HEMATOCHEZIA: 0
PALPITATIONS: 0
EYE PAIN: 0
JOINT SWELLING: 0
DIZZINESS: 0
COUGH: 0
SORE THROAT: 0
PARESTHESIAS: 0
FREQUENCY: 0
HEARTBURN: 0
FEVER: 0
ARTHRALGIAS: 1

## 2023-05-27 ENCOUNTER — HEALTH MAINTENANCE LETTER (OUTPATIENT)
Age: 68
End: 2023-05-27

## 2023-05-31 ENCOUNTER — OFFICE VISIT (OUTPATIENT)
Dept: FAMILY MEDICINE | Facility: CLINIC | Age: 68
End: 2023-05-31
Payer: MEDICARE

## 2023-05-31 VITALS
OXYGEN SATURATION: 99 % | DIASTOLIC BLOOD PRESSURE: 80 MMHG | HEART RATE: 62 BPM | BODY MASS INDEX: 23.89 KG/M2 | WEIGHT: 159 LBS | RESPIRATION RATE: 16 BRPM | TEMPERATURE: 97.8 F | SYSTOLIC BLOOD PRESSURE: 129 MMHG

## 2023-05-31 DIAGNOSIS — I10 BENIGN ESSENTIAL HYPERTENSION: ICD-10-CM

## 2023-05-31 DIAGNOSIS — F33.0 MILD RECURRENT MAJOR DEPRESSION (H): ICD-10-CM

## 2023-05-31 DIAGNOSIS — N18.31 CHRONIC KIDNEY DISEASE, STAGE 3A (H): ICD-10-CM

## 2023-05-31 DIAGNOSIS — Z00.00 ENCOUNTER FOR ROUTINE ADULT HEALTH EXAMINATION WITHOUT ABNORMAL FINDINGS: Primary | ICD-10-CM

## 2023-05-31 DIAGNOSIS — J40 BRONCHITIS: ICD-10-CM

## 2023-05-31 DIAGNOSIS — Z00.00 ENCOUNTER FOR MEDICARE ANNUAL WELLNESS EXAM: ICD-10-CM

## 2023-05-31 LAB
ALBUMIN SERPL BCG-MCNC: 4 G/DL (ref 3.5–5.2)
ALP SERPL-CCNC: 74 U/L (ref 35–104)
ALT SERPL W P-5'-P-CCNC: 26 U/L (ref 10–35)
ANION GAP SERPL CALCULATED.3IONS-SCNC: 10 MMOL/L (ref 7–15)
AST SERPL W P-5'-P-CCNC: 43 U/L (ref 10–35)
BILIRUB SERPL-MCNC: 0.2 MG/DL
BUN SERPL-MCNC: 26.8 MG/DL (ref 8–23)
CALCIUM SERPL-MCNC: 8.9 MG/DL (ref 8.8–10.2)
CHLORIDE SERPL-SCNC: 107 MMOL/L (ref 98–107)
CHOLEST SERPL-MCNC: 211 MG/DL
CREAT SERPL-MCNC: 1.06 MG/DL (ref 0.51–0.95)
DEPRECATED HCO3 PLAS-SCNC: 24 MMOL/L (ref 22–29)
ERYTHROCYTE [DISTWIDTH] IN BLOOD BY AUTOMATED COUNT: 13.1 % (ref 10–15)
GFR SERPL CREATININE-BSD FRML MDRD: 57 ML/MIN/1.73M2
GLUCOSE SERPL-MCNC: 100 MG/DL (ref 70–99)
HCT VFR BLD AUTO: 36.5 % (ref 35–47)
HDLC SERPL-MCNC: 101 MG/DL
HGB BLD-MCNC: 11.9 G/DL (ref 11.7–15.7)
LDLC SERPL CALC-MCNC: 97 MG/DL
MCH RBC QN AUTO: 29.9 PG (ref 26.5–33)
MCHC RBC AUTO-ENTMCNC: 32.6 G/DL (ref 31.5–36.5)
MCV RBC AUTO: 92 FL (ref 78–100)
NONHDLC SERPL-MCNC: 110 MG/DL
PLATELET # BLD AUTO: 216 10E3/UL (ref 150–450)
POTASSIUM SERPL-SCNC: 4.4 MMOL/L (ref 3.4–5.3)
PROT SERPL-MCNC: 6.8 G/DL (ref 6.4–8.3)
RBC # BLD AUTO: 3.98 10E6/UL (ref 3.8–5.2)
SODIUM SERPL-SCNC: 141 MMOL/L (ref 136–145)
TRIGL SERPL-MCNC: 65 MG/DL
TSH SERPL DL<=0.005 MIU/L-ACNC: 3.55 UIU/ML (ref 0.3–4.2)
WBC # BLD AUTO: 6.1 10E3/UL (ref 4–11)

## 2023-05-31 PROCEDURE — 84443 ASSAY THYROID STIM HORMONE: CPT | Performed by: FAMILY MEDICINE

## 2023-05-31 PROCEDURE — 36415 COLL VENOUS BLD VENIPUNCTURE: CPT | Performed by: FAMILY MEDICINE

## 2023-05-31 PROCEDURE — 80061 LIPID PANEL: CPT | Performed by: FAMILY MEDICINE

## 2023-05-31 PROCEDURE — G0439 PPPS, SUBSEQ VISIT: HCPCS | Performed by: FAMILY MEDICINE

## 2023-05-31 PROCEDURE — 99213 OFFICE O/P EST LOW 20 MIN: CPT | Mod: 25 | Performed by: FAMILY MEDICINE

## 2023-05-31 PROCEDURE — 85027 COMPLETE CBC AUTOMATED: CPT | Performed by: FAMILY MEDICINE

## 2023-05-31 PROCEDURE — 80053 COMPREHEN METABOLIC PANEL: CPT | Performed by: FAMILY MEDICINE

## 2023-05-31 RX ORDER — ALBUTEROL SULFATE 90 UG/1
2 AEROSOL, METERED RESPIRATORY (INHALATION) EVERY 6 HOURS PRN
Qty: 18 G | Refills: 4 | Status: SHIPPED | OUTPATIENT
Start: 2023-05-31

## 2023-05-31 ASSESSMENT — ENCOUNTER SYMPTOMS
HEMATURIA: 0
FEVER: 0
PALPITATIONS: 0
NAUSEA: 0
DIARRHEA: 0
NERVOUS/ANXIOUS: 1
JOINT SWELLING: 0
SHORTNESS OF BREATH: 0
DYSURIA: 0
SORE THROAT: 0
HEADACHES: 0
COUGH: 0
WEAKNESS: 0
CHILLS: 0
HEMATOCHEZIA: 0
EYE PAIN: 0
MYALGIAS: 0
ABDOMINAL PAIN: 0
FREQUENCY: 0
PARESTHESIAS: 0
ARTHRALGIAS: 1
BREAST MASS: 0
HEARTBURN: 0
DIZZINESS: 0
CONSTIPATION: 0

## 2023-05-31 ASSESSMENT — ACTIVITIES OF DAILY LIVING (ADL): CURRENT_FUNCTION: NO ASSISTANCE NEEDED

## 2023-05-31 ASSESSMENT — PAIN SCALES - GENERAL: PAINLEVEL: NO PAIN (0)

## 2023-05-31 ASSESSMENT — PATIENT HEALTH QUESTIONNAIRE - PHQ9
SUM OF ALL RESPONSES TO PHQ QUESTIONS 1-9: 0
SUM OF ALL RESPONSES TO PHQ QUESTIONS 1-9: 0
10. IF YOU CHECKED OFF ANY PROBLEMS, HOW DIFFICULT HAVE THESE PROBLEMS MADE IT FOR YOU TO DO YOUR WORK, TAKE CARE OF THINGS AT HOME, OR GET ALONG WITH OTHER PEOPLE: NOT DIFFICULT AT ALL

## 2023-05-31 NOTE — PROGRESS NOTES
"SUBJECTIVE:   Maggi is a 68 year old who presents for Preventive Visit.      5/31/2023     7:13 AM   Additional Questions   Roomed by faviola alex   Patient has been advised of split billing requirements and indicates understanding: Yes  Are you in the first 12 months of your Medicare coverage?  No      Low back pain on and off , a couple of times a week   Healthy Habits:     In general, how would you rate your overall health?  Excellent    Frequency of exercise:  4-5 days/week    Duration of exercise:  45-60 minutes    Do you usually eat at least 4 servings of fruit and vegetables a day, include whole grains    & fiber and avoid regularly eating high fat or \"junk\" foods?  Yes    Taking medications regularly:  Yes    Medication side effects:  None    Ability to successfully perform activities of daily living:  No assistance needed    Home Safety:  No safety concerns identified    Hearing Impairment:  Feel that people are mumbling or not speaking clearly    In the past 6 months, have you been bothered by leaking of urine?  No    In general, how would you rate your overall mental or emotional health?  Good      PHQ-2 Total Score: 0    Additional concerns today:  Yes        Have you ever done Advance Care Planning? (For example, a Health Directive, POLST, or a discussion with a medical provider or your loved ones about your wishes): No, advance care planning information given to patient to review.  Patient declined advance care planning discussion at this time.       Fall risk  Fallen 2 or more times in the past year?: No  Any fall with injury in the past year?: No    Cognitive Screening   1) Repeat 3 items (Leader, Season, Table)    2) Clock draw: NORMAL  3) 3 item recall: Recalls 3 objects  Results: 3 items recalled: COGNITIVE IMPAIRMENT LESS LIKELY    Mini-CogTM Copyright PROSPER Webb. Licensed by the author for use in [facility]; reprinted with permission ([email or web address]@.[email or web address]). All rights reserved.      Do you " have sleep apnea, excessive snoring or daytime drowsiness?: no    Reviewed and updated as needed this visit by clinical staff   Tobacco  Allergies  Meds              Reviewed and updated as needed this visit by Provider                 Social History     Tobacco Use     Smoking status: Never     Smokeless tobacco: Never   Vaping Use     Vaping status: Not on file   Substance Use Topics     Alcohol use: Yes     Alcohol/week: 0.0 standard drinks of alcohol     Comment: 1 glass wine per month             5/25/2023     2:50 PM   Alcohol Use   Prescreen: >3 drinks/day or >7 drinks/week? No          View : No data to display.              Do you have a current opioid prescription? No  Do you use any other controlled substances or medications that are not prescribed by a provider? None          PROBLEMS TO ADD ON...    Current providers sharing in care for this patient include:   Patient Care Team:  Sara Thomas MD as PCP - General (Family Practice)  Sara Thomas MD as Assigned PCP  Siomara Salcedo MD as MD (Nephrology)  Siomara Salcedo MD as Assigned Nephrology Provider  Georgina Eller RN as Specialty Care Coordinator (Nephrology)    The following health maintenance items are reviewed in Epic and correct as of today:  Health Maintenance   Topic Date Due     DEXA  05/20/2019     COVID-19 Vaccine (4 - Moderna series) 01/04/2022     MEDICARE ANNUAL WELLNESS VISIT  03/30/2023     LIPID  03/30/2023     MICROALBUMIN  03/30/2023     ANNUAL REVIEW OF HM ORDERS  03/30/2023     BMP  05/11/2023     HEMOGLOBIN  05/11/2023     PHQ-9  11/30/2023     MAMMO SCREENING  05/03/2024     FALL RISK ASSESSMENT  05/31/2024     COLORECTAL CANCER SCREENING  07/01/2026     ADVANCE CARE PLANNING  03/31/2027     DTAP/TDAP/TD IMMUNIZATION (3 - Td or Tdap) 10/31/2027     HEPATITIS C SCREENING  Completed     DEPRESSION ACTION PLAN  Completed     INFLUENZA VACCINE  Completed     Pneumococcal Vaccine: 65+ Years  Completed     URINALYSIS   Completed     ZOSTER IMMUNIZATION  Completed     IPV IMMUNIZATION  Aged Out     MENINGITIS IMMUNIZATION  Aged Out     Lab work is in process  Labs reviewed in EPIC  BP Readings from Last 3 Encounters:   05/31/23 129/80   05/11/22 (!) 174/82   03/30/22 124/78    Wt Readings from Last 3 Encounters:   05/31/23 72.1 kg (159 lb)   05/11/22 79.4 kg (175 lb)   03/30/22 77.8 kg (171 lb 8 oz)                  Patient Active Problem List   Diagnosis     Disorder of bone and cartilage     Disease of lung     Mild recurrent major depression (H)     CARDIOVASCULAR SCREENING; LDL GOAL LESS THAN 160     Hypertension goal BP (blood pressure) < 140/90     Advanced directives, counseling/discussion     Discharge from left nipple     Cervical lymphadenopathy     Elevated serum creatinine     Benign essential hypertension     Chronic kidney disease, stage 3a (H)     Past Surgical History:   Procedure Laterality Date     DILATION AND CURETTAGE  8/21/2013    Procedure: DILATION AND CURETTAGE;;  Surgeon: Tamar Walsh MD;  Location: [facility]     LAPAROSCOPIC SALPINGO-OOPHORECTOMY  8/21/2013    Procedure: LAPAROSCOPIC SALPINGO-OOPHORECTOMY;  Laparoscopic Bilateral Salpingo-Oophorectomy, Pelvic washings, Dilation and Curettage;  Surgeon: Tamar Walsh MD;  Location:  OR     LUMPECTOMY BREAST Bilateral 5/31/2016    Procedure: LUMPECTOMY BREAST;  Surgeon: Barney Givens MD;  Location:  OR       Social History     Tobacco Use     Smoking status: Never     Smokeless tobacco: Never   Vaping Use     Vaping status: Not on file   Substance Use Topics     Alcohol use: Yes     Alcohol/week: 0.0 standard drinks of alcohol     Comment: 1 glass wine per month     Family History   Problem Relation Age of Onset     Arthritis Mother      Osteoporosis Mother         at age 75     Heart Disease Mother         arhythmia     Allergies Mother         seasonal     Alzheimer Disease Mother         at age 81     C.A.D. Father         bypass  at age 78 and Pace maker at age 90     Diabetes Father         type 2     Heart Disease Father      Gastrointestinal Disease Father         ulcers     Coronary Artery Disease Father      Breast Cancer Maternal Grandmother         at age 40's     Cerebrovascular Disease Maternal Grandfather         at age 70's     C.A.D. Paternal Grandmother         congestive heart failure     Respiratory Brother         bronchiestis     Coronary Artery Disease Brother          Current Outpatient Medications   Medication Sig Dispense Refill     albuterol (PROAIR HFA/PROVENTIL HFA/VENTOLIN HFA) 108 (90 Base) MCG/ACT inhaler Inhale 2 puffs into the lungs every 6 hours as needed for shortness of breath or wheezing 18 g 4     FLUoxetine (PROZAC) 20 MG capsule Take 3 capsules (60 mg) by mouth daily 90 capsule 11     methylphenidate (RITALIN) 10 MG tablet Take 10 mg by mouth 2 times daily 1.5 in AM, 1.5 NOON, 20mg in PM if needed.       REFRESH 1 % OP SOLN as needed 1 0     Allergies   Allergen Reactions     No Known Allergies      Recent Labs   Lab Test 05/11/22  1224 03/30/22  0942 12/30/20  0809 01/10/20  0741 11/27/19  0835 09/10/19  1430 01/16/19  1422 10/31/17  1025 03/31/16  1152   A1C  --   --  5.3  --   --   --   --  5.2  --    LDL  --  105* 94  --   --  124*  --  85 128*   HDL  --  109 120  --   --  95  --  111 135   TRIG  --  59 64  --   --  57  --  56 59   ALT  --  26  --   --   --  25  --  47  --    CR 1.03 1.14* 1.08* 1.28*   < > 1.09* 1.29* 1.25*  --    GFRESTIMATED 59* 53* 53* 44*   < > 53* 44* 43*  --    GFRESTBLACK  --   --  62 51*   < > 62 50* 52*  --    POTASSIUM 4.6 4.8 4.8 4.5   < > 4.4 4.4 4.6  --    TSH  --   --   --   --   --   --  2.22  --  1.79    < > = values in this interval not displayed.      Mammogram Screening: Mammogram Screening: Recommended mammography every 1-2 years with patient discussion and risk factor consideration        3/30/2022     8:57 AM 4/12/2022    10:32 AM   Breast CA Risk Assessment  "(FHS-7)   Do you have a family history of breast, colon, or ovarian cancer? No / Unknown No / Unknown         Mammogram Screening: Recommended mammography every 1-2 years with patient discussion and risk factor consideration  Pertinent mammograms are reviewed under the imaging tab.    Review of Systems   Constitutional: Negative for chills and fever.   HENT: Positive for ear pain and hearing loss. Negative for congestion and sore throat.    Eyes: Negative for pain and visual disturbance.   Respiratory: Negative for cough and shortness of breath.    Cardiovascular: Negative for chest pain, palpitations and peripheral edema.   Gastrointestinal: Negative for abdominal pain, constipation, diarrhea, heartburn, hematochezia and nausea.   Breasts:  Negative for tenderness, breast mass and discharge.   Genitourinary: Positive for urgency. Negative for dysuria, frequency, genital sores, hematuria, pelvic pain, vaginal bleeding and vaginal discharge.   Musculoskeletal: Positive for arthralgias. Negative for joint swelling and myalgias.   Skin: Negative for rash.   Neurological: Negative for dizziness, weakness, headaches and paresthesias.   Psychiatric/Behavioral: Negative for mood changes. The patient is nervous/anxious.      Constitutional, HEENT, cardiovascular, pulmonary, GI, , musculoskeletal, neuro, skin, endocrine and psych systems are negative, except as otherwise noted.    OBJECTIVE:   /80   Pulse 62   Temp 97.8  F (36.6  C) (Temporal)   Resp 16   Wt 72.1 kg (159 lb)   LMP 08/15/2010   SpO2 99%   BMI 23.89 kg/m   Estimated body mass index is 23.89 kg/m  as calculated from the following:    Height as of 3/30/22: 1.737 m (5' 8.4\").    Weight as of this encounter: 72.1 kg (159 lb).  Physical Exam  GENERAL: healthy, alert and no distress  EYES: Eyes grossly normal to inspection, PERRL and conjunctivae and sclerae normal  HENT: ear canals and TM's normal, nose and mouth without ulcers or lesions  NECK: no " adenopathy, no asymmetry, masses, or scars and thyroid normal to palpation  RESP: lungs clear to auscultation - no rales, rhonchi or wheezes  BREAST: normal without masses, tenderness or nipple discharge and no palpable axillary masses or adenopathy  CV: regular rate and rhythm, normal S1 S2, no S3 or S4, no murmur, click or rub, no peripheral edema and peripheral pulses strong  ABDOMEN: soft, nontender, no hepatosplenomegaly, no masses and bowel sounds normal  MS: no gross musculoskeletal defects noted, no edema  SKIN: no suspicious lesions or rashes  NEURO: Normal strength and tone, mentation intact and speech normal  PSYCH: mentation appears normal, affect normal/bright    Diagnostic Test Results:  Labs reviewed in Epic  Results for orders placed or performed in visit on 05/31/23 (from the past 24 hour(s))   CBC with platelets   Result Value Ref Range    WBC Count 6.1 4.0 - 11.0 10e3/uL    RBC Count 3.98 3.80 - 5.20 10e6/uL    Hemoglobin 11.9 11.7 - 15.7 g/dL    Hematocrit 36.5 35.0 - 47.0 %    MCV 92 78 - 100 fL    MCH 29.9 26.5 - 33.0 pg    MCHC 32.6 31.5 - 36.5 g/dL    RDW 13.1 10.0 - 15.0 %    Platelet Count 216 150 - 450 10e3/uL       ASSESSMENT / PLAN:   (Z00.00) Encounter for routine adult health examination without abnormal findings  (primary encounter diagnosis)  Comment: Discussed diet,calcium,exercise.Went over self breast exam.Thin prep was NOT done.Eyes and teeth UTD.No immunizations needed today.See orders below for tests ordered and screening needed.    Plan: Lipid panel reflex to direct LDL Fasting, TSH         with free T4 reflex        Labs as above     (F33.0) Mild recurrent major depression (H)  Comment: doing well on the current medication, no side effects and depression is well controlled on it   Plan: FLUoxetine (PROZAC) 20 MG capsule        As above     (I10) Benign essential hypertension  Comment: her BP has been well controlled without the ACE inhibitor and she is doing well   Plan:  Comprehensive metabolic panel (BMP + Alb, Alk         Phos, ALT, AST, Total. Bili, TP), CBC with         platelets        Will check labs today     (N18.31) Chronic kidney disease, stage 3a (H)  Comment: she has labs placed by her kidney specialist and she will schedule her annual visit with them   Plan: as above     (J40) Bronchitis  Comment: refilled, only using as needed   Plan: albuterol (PROAIR HFA/PROVENTIL HFA/VENTOLIN         HFA) 108 (90 Base) MCG/ACT inhaler        No concerns today about this       Patient has been advised of split billing requirements and indicates understanding: Yes      COUNSELING:  Reviewed preventive health counseling, as reflected in patient instructions       Regular exercise       Healthy diet/nutrition       Vision screening       Hearing screening       Dental care       Bladder control       Fall risk prevention       Osteoporosis prevention/bone health        She reports that she has never smoked. She has never used smokeless tobacco.      Appropriate preventive services were discussed with this patient, including applicable screening as appropriate for cardiovascular disease, diabetes, osteopenia/osteoporosis, and glaucoma.  As appropriate for age/gender, discussed screening for colorectal cancer, prostate cancer, breast cancer, and cervical cancer. Checklist reviewing preventive services available has been given to the patient.    Reviewed patients plan of care and provided an AVS. The Intermediate Care Plan ( asthma action plan, low back pain action plan, and migraine action plan) for Maggi meets the Care Plan requirement. This Care Plan has been established and reviewed with the Patient.      Sara Thomas MD  [facility]    Identified Health Risks:    I have reviewed Opioid Use Disorder and Substance Use Disorder risk factors and made any needed referrals.       The patient was provided with written information regarding signs of hearing loss.

## 2023-05-31 NOTE — PATIENT INSTRUCTIONS
Patient Education   Personalized Prevention Plan  You are due for the preventive services outlined below.  Your care team is available to assist you in scheduling these services.  If you have already completed any of these items, please share that information with your care team to update in your medical record.  Health Maintenance Due   Topic Date Due     Osteoporosis Screening  05/20/2019     COVID-19 Vaccine (4 - Moderna series) 01/04/2022     Cholesterol Lab  03/30/2023     Kidney Microalbumin Urine Test  03/30/2023     ANNUAL REVIEW OF HM ORDERS  03/30/2023     Basic Metabolic Panel  05/11/2023       Signs of Hearing Loss  Hearing loss is a problem shared by many people. In fact, it's one of the most common health problems, particularly as people age. Most people aged 65 and older have some hearing loss. By age 80, almost everyone does. Hearing loss often occurs slowly over the years. So, you may not realize your hearing has gotten worse.   When sudden hearing loss occurs, it's important to contact your healthcare provider right away. Your provider will do a medical exam and a hearing exam as soon as possible. This is to help find the cause and type of your sudden hearing loss. Based on your diagnosis, your healthcare provider will discuss possible treatments.      Hearing much better with one ear can be a sign of hearing loss.     Have your hearing checked  Call your healthcare provider if you:     Have to strain to hear normal conversation    Have to watch other people s faces very carefully to follow what they re saying    Need to ask people to repeat what they ve said    Often misunderstand what people are saying    Turn the volume of the television or radio up so high that others complain    Feel that people are mumbling when they re talking to you    Find that the effort to hear leaves you feeling tired and irritated    Notice, when using the phone, that you hear better with one ear than the  yon  [employer] last reviewed this educational content on 6/1/2022 2000-2022 [employer]. All rights reserved. This information is not intended as a substitute for professional medical care. Always follow your healthcare professional's instructions.

## 2023-08-09 ENCOUNTER — LAB (OUTPATIENT)
Dept: LAB | Facility: CLINIC | Age: 68
End: 2023-08-09
Payer: MEDICARE

## 2023-08-09 ENCOUNTER — OFFICE VISIT (OUTPATIENT)
Dept: NEPHROLOGY | Facility: CLINIC | Age: 68
End: 2023-08-09
Attending: INTERNAL MEDICINE
Payer: MEDICARE

## 2023-08-09 VITALS
OXYGEN SATURATION: 99 % | BODY MASS INDEX: 24.1 KG/M2 | HEART RATE: 74 BPM | SYSTOLIC BLOOD PRESSURE: 123 MMHG | WEIGHT: 160.4 LBS | DIASTOLIC BLOOD PRESSURE: 78 MMHG

## 2023-08-09 DIAGNOSIS — N18.31 CHRONIC KIDNEY DISEASE, STAGE 3A (H): ICD-10-CM

## 2023-08-09 DIAGNOSIS — N18.31 CHRONIC KIDNEY DISEASE, STAGE 3A (H): Primary | ICD-10-CM

## 2023-08-09 LAB
ALBUMIN MFR UR ELPH: 6.5 MG/DL
ALBUMIN SERPL BCG-MCNC: 4.2 G/DL (ref 3.5–5.2)
ANION GAP SERPL CALCULATED.3IONS-SCNC: 8 MMOL/L (ref 7–15)
BUN SERPL-MCNC: 18.6 MG/DL (ref 8–23)
CALCIUM SERPL-MCNC: 9.3 MG/DL (ref 8.8–10.2)
CHLORIDE SERPL-SCNC: 105 MMOL/L (ref 98–107)
CREAT SERPL-MCNC: 1.17 MG/DL (ref 0.51–0.95)
CREAT UR-MCNC: 97 MG/DL
DEPRECATED HCO3 PLAS-SCNC: 27 MMOL/L (ref 22–29)
GFR SERPL CREATININE-BSD FRML MDRD: 50 ML/MIN/1.73M2
GLUCOSE SERPL-MCNC: 106 MG/DL (ref 70–99)
HGB BLD-MCNC: 11.8 G/DL (ref 11.7–15.7)
PHOSPHATE SERPL-MCNC: 3.5 MG/DL (ref 2.5–4.5)
POTASSIUM SERPL-SCNC: 4.5 MMOL/L (ref 3.4–5.3)
PROT/CREAT 24H UR: 0.07 MG/MG CR (ref 0–0.2)
SODIUM SERPL-SCNC: 140 MMOL/L (ref 136–145)

## 2023-08-09 PROCEDURE — 84156 ASSAY OF PROTEIN URINE: CPT | Performed by: PATHOLOGY

## 2023-08-09 PROCEDURE — 99213 OFFICE O/P EST LOW 20 MIN: CPT | Performed by: INTERNAL MEDICINE

## 2023-08-09 PROCEDURE — 36415 COLL VENOUS BLD VENIPUNCTURE: CPT | Performed by: PATHOLOGY

## 2023-08-09 PROCEDURE — G0463 HOSPITAL OUTPT CLINIC VISIT: HCPCS | Performed by: INTERNAL MEDICINE

## 2023-08-09 PROCEDURE — 80069 RENAL FUNCTION PANEL: CPT | Performed by: PATHOLOGY

## 2023-08-09 PROCEDURE — 85018 HEMOGLOBIN: CPT | Performed by: PATHOLOGY

## 2023-08-09 ASSESSMENT — PAIN SCALES - GENERAL: PAINLEVEL: NO PAIN (0)

## 2023-08-09 NOTE — LETTER
8/9/2023       RE: Maggi Reynolds  [address]  [place] [address]     Dear Colleague,    Thank you for referring your patient, Maggi Reynolds, to the [facility] [place] at [facility]. Please see a copy of my visit note below.    Nephrology Clinic Visit 08/09/2023     Assessment and Plan:    1. CKD3 w/o proteinuria -   Etiology unclear (no h/o prematurity, preeclampsia, structural abnormality), though may be due to HTN as she had episode of very high blood pressure in the past.  Stable with creatinine 1.06 mg/dL on May 31 2023 and 1.17 mg/dL today. Baseline creat 1.1-1.3 since 2016.    - Additional w/u was neg including SPEP, Light chain ratio. She has no proteinuria and her renal US was unremarkable.    - Blood pressure at goal running ~110 systolic at home    2. HTN/Volume status -white coat HTN with normal BP at home -  controlled with diet/exercise  - BP at goal without medication   - Continue to monitor home blood pressures.    3. Anemia - history of iron deficiency, hemoglobin normal today    Return in about 1 year (around 8/9/2024).     Assessment and plan was discussed with patient and she voiced her understanding and agreement.    Siomara Salcedo MD  [facility]  Department of Medicine  Division of Nephrology and Hypertension  [facility]  [email or web address]  Vocera Web Console     Reason for Visit:  CKD3/HTN    HPI:  Maggi Reynolds is a 68 year old  with CKD3, HTN, Anemia. In past, creatinine improved with discontinuation of ACE inhibitor      Last visit 5/11/2022  Had COVID Dec 2022 treated with paxlovid and recovered though was pretty sick initially (did not require hospitalization).    Has been active traveling, gardening, teaching knitting classes. Mowing lawn with push mower without issue. No dyspnea, no CP, no edema.    ROS:    ROS: 10 point ROS neg other than the symptoms noted  above in the HPI.       Chronic Health Problems:  CKD3  HTN  Ammonia exposure when train derailed   Depression  Anemia  ADD    Family Hx:   Family History   Problem Relation Age of Onset    Arthritis Mother     Osteoporosis Mother         at age 75    Heart Disease Mother         arhythmia    Allergies Mother         seasonal    Alzheimer Disease Mother         at age 81    C.A.D. Father         bypass at age 78 and Pace maker at age 90    Diabetes Father         type 2    Heart Disease Father     Gastrointestinal Disease Father         ulcers    Coronary Artery Disease Father     Breast Cancer Maternal Grandmother         at age 40's    Cerebrovascular Disease Maternal Grandfather         at age 70's    C.A.D. 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 Walking daily    Allergies:  Allergies   Allergen Reactions    No Known Allergies        Medications:  Current Outpatient Medications   Medication Sig    albuterol (PROAIR HFA/PROVENTIL HFA/VENTOLIN HFA) 108 (90 Base) MCG/ACT inhaler Inhale 2 puffs into the lungs every 6 hours as needed for shortness of breath or wheezing    FLUoxetine (PROZAC) 20 MG capsule Take 3 capsules (60 mg) by mouth daily    methylphenidate (RITALIN) 10 MG tablet Take 10 mg by mouth 2 times daily 1.5 in AM, 1.5 NOON, 20mg in PM if needed.    REFRESH 1 % OP SOLN as needed     No current facility-administered medications for this visit.      Vitals:   /78   Pulse 74   Wt 72.8 kg (160 lb 6.4 oz)   LMP 08/15/2010   SpO2 99%   BMI 24.10 kg/m       Exam:  GENERAL APPEARANCE: alert and no distress  EYES: no scleral icterus, pupils equal  HENT: NC/AT  Pulmonary: normal work of breathing, no clubbing  CV: WWP   - Edema none  SKIN: no rash, warm, dry, no cyanosis  NEURO: mentation intact  and speech normal     LABS:   CMP  Recent Labs   Lab Test 05/31/23  0741 05/11/22  1224 03/30/22  0942 12/30/20  0809 01/10/20  0741 12/02/19  0732 11/27/19  0835    140 139 139 137 135 138   POTASSIUM 4.4 4.6 4.8 4.8 4.5 4.4 4.5   CHLORIDE 107 106 107 108 105 102 107   CO2 24 29 29 28 27 27 27   ANIONGAP 10 5 3 3 5 6 5   * 98 91 98 98 116* 93   BUN 26.8* 20 24 15 23 19 29   CR 1.06* 1.03 1.14* 1.08* 1.28* 1.27* 1.63*   GFRESTIMATED 57* 59* 53* 53* 44* 44* 33*   GFRESTBLACK  --   --   --  62 51* 51* 38*   MANJIT 8.9 9.4 8.6 9.3 8.7 9.2 8.9     Recent Labs   Lab Test 05/31/23  0741 03/30/22  0942 09/10/19  1430 10/31/17  1025   BILITOTAL 0.2 1.1 0.3 0.5   ALKPHOS 74 87 84 118   ALT 26 26 25 47   AST 43* 20 19 26     CBC  Recent Labs   Lab Test 05/31/23  0741 05/11/22  1224 03/30/22  0942 12/30/20  0809 01/10/20  0741   HGB 11.9 11.8 12.1 12.5 11.6*   WBC 6.1  --  5.2 5.2 5.5   RBC 3.98  --  4.00 4.22 3.88   HCT 36.5  --  37.5 39.5 36.5   MCV 92  --  94 94 94   MCH 29.9  --  30.3 29.6 29.9   MCHC 32.6  --  32.3 31.6 31.8   RDW 13.1  --  13.2 13.2 13.2     --  260 261 235     URINE STUDIES  Recent Labs   Lab Test 11/27/19  0842   COLOR Yellow   APPEARANCE Slightly Cloudy   URINEGLC Negative   URINEBILI Negative   URINEKETONE 5*   SG 1.021   UBLD Negative   URINEPH 5.0   PROTEIN Negative   NITRITE Negative   LEUKEST Negative   RBCU 1   WBCU 2     Recent Labs   Lab Test 01/10/20  0756 11/27/19  0842   UTPG 0.06 0.04     PTH  Recent Labs   Lab Test 05/11/22  1224 11/27/19  0835   PTHI 51 59     IRON STUDIES  Recent Labs   Lab Test 12/30/20  0809 11/27/19  0835   IRON 70 47    259   IRONSAT 24 18   ARACELI 83 108           Siomara Salcedo MD

## 2023-08-09 NOTE — PROGRESS NOTES
Nephrology Clinic Visit 08/09/2023     Assessment and Plan:    1. CKD3 w/o proteinuria -   Etiology unclear (no h/o prematurity, preeclampsia, structural abnormality), though may be due to HTN as she had episode of very high blood pressure in the past.  Stable with creatinine 1.06 mg/dL on May 31 2023 and 1.17 mg/dL today. Baseline creat 1.1-1.3 since 2016.    - Additional w/u was neg including SPEP, Light chain ratio. She has no proteinuria and her renal US was unremarkable.    - Blood pressure at goal running ~110 systolic at home    2. HTN/Volume status -white coat HTN with normal BP at home -  controlled with diet/exercise  - BP at goal without medication   - Continue to monitor home blood pressures.    3. Anemia - history of iron deficiency, hemoglobin normal today    Return in about 1 year (around 8/9/2024).     Assessment and plan was discussed with patient and she voiced her understanding and agreement.    Siomara Salcedo MD  [facility]  Department of Medicine  Division of Nephrology and Hypertension  [employer]  myairmail  Vocera Web Console     Reason for Visit:  CKD3/HTN    HPI:  Maggi Reynolds is a 68 year old  with CKD3, HTN, Anemia. In past, creatinine improved with discontinuation of ACE inhibitor      Last visit 5/11/2022  Had COVID Dec 2022 treated with paxlovid and recovered though was pretty sick initially (did not require hospitalization).    Has been active traveling, gardening, teaching knitting classes. Mowing lawn with push mower without issue. No dyspnea, no CP, no edema.    ROS:    ROS: 10 point ROS neg other than the symptoms noted above in the HPI.       Chronic Health Problems:  CKD3  HTN  Ammonia exposure when train derailed   Depression  Anemia  ADD    Family Hx:   Family History   Problem Relation Age of Onset    Arthritis Mother     Osteoporosis Mother         at age 75    Heart Disease Mother         arhythmia    Allergies Mother         seasonal     Alzheimer Disease Mother         at age 81    C.A.D. Father         bypass at age 78 and Pace maker at age 90    Diabetes Father         type 2    Heart Disease Father     Gastrointestinal Disease Father         ulcers    Coronary Artery Disease Father     Breast Cancer Maternal Grandmother         at age 40's    Cerebrovascular Disease Maternal Grandfather         at age 70's    C.A.D. 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 Walking daily    Allergies:  Allergies   Allergen Reactions    No Known Allergies        Medications:  Current Outpatient Medications   Medication Sig    albuterol (PROAIR HFA/PROVENTIL HFA/VENTOLIN HFA) 108 (90 Base) MCG/ACT inhaler Inhale 2 puffs into the lungs every 6 hours as needed for shortness of breath or wheezing    FLUoxetine (PROZAC) 20 MG capsule Take 3 capsules (60 mg) by mouth daily    methylphenidate (RITALIN) 10 MG tablet Take 10 mg by mouth 2 times daily 1.5 in AM, 1.5 NOON, 20mg in PM if needed.    REFRESH 1 % OP SOLN as needed     No current facility-administered medications for this visit.      Vitals:   /78   Pulse 74   Wt 72.8 kg (160 lb 6.4 oz)   LMP 08/15/2010   SpO2 99%   BMI 24.10 kg/m       Exam:  GENERAL APPEARANCE: alert and no distress  EYES: no scleral icterus, pupils equal  HENT: NC/AT  Pulmonary: normal work of breathing, no clubbing  CV: WWP   - Edema none  SKIN: no rash, warm, dry, no cyanosis  NEURO: mentation intact and speech normal     LABS:   CMP  Recent Labs   Lab Test 05/31/23  0741 05/11/22  1224 03/30/22  0942 12/30/20  0809 01/10/20  0741 12/02/19  0732 11/27/19  0835    140 139 139 137 135 138   POTASSIUM 4.4 4.6 4.8 4.8 4.5 4.4 4.5   CHLORIDE 107 106 107 108 105 102 107   CO2 24 29 29 28 27 27 27   ANIONGAP 10 5 3 3 5 6 5   GLC  100* 98 91 98 98 116* 93   BUN 26.8* 20 24 15 23 19 29   CR 1.06* 1.03 1.14* 1.08* 1.28* 1.27* 1.63*   GFRESTIMATED 57* 59* 53* 53* 44* 44* 33*   GFRESTBLACK  --   --   --  62 51* 51* 38*   MANJIT 8.9 9.4 8.6 9.3 8.7 9.2 8.9     Recent Labs   Lab Test 05/31/23  0741 03/30/22  0942 09/10/19  1430 10/31/17  1025   BILITOTAL 0.2 1.1 0.3 0.5   ALKPHOS 74 87 84 118   ALT 26 26 25 47   AST 43* 20 19 26     CBC  Recent Labs   Lab Test 05/31/23  0741 05/11/22  1224 03/30/22  0942 12/30/20  0809 01/10/20  0741   HGB 11.9 11.8 12.1 12.5 11.6*   WBC 6.1  --  5.2 5.2 5.5   RBC 3.98  --  4.00 4.22 3.88   HCT 36.5  --  37.5 39.5 36.5   MCV 92  --  94 94 94   MCH 29.9  --  30.3 29.6 29.9   MCHC 32.6  --  32.3 31.6 31.8   RDW 13.1  --  13.2 13.2 13.2     --  260 261 235     URINE STUDIES  Recent Labs   Lab Test 11/27/19  0842   COLOR Yellow   APPEARANCE Slightly Cloudy   URINEGLC Negative   URINEBILI Negative   URINEKETONE 5*   SG 1.021   UBLD Negative   URINEPH 5.0   PROTEIN Negative   NITRITE Negative   LEUKEST Negative   RBCU 1   WBCU 2     Recent Labs   Lab Test 01/10/20  0756 11/27/19  0842   UTPG 0.06 0.04     PTH  Recent Labs   Lab Test 05/11/22  1224 11/27/19  0835   PTHI 51 59     IRON STUDIES  Recent Labs   Lab Test 12/30/20  0809 11/27/19  0835   IRON 70 47    259   IRONSAT 24 18   ARACELI 83 108           Siomara Salcedo MD

## 2023-08-09 NOTE — PATIENT INSTRUCTIONS
As discussed your kidney function is stable. Let me know if your blood pressures start running.    Return in about 1 year (around 8/9/2024).

## 2023-11-17 ENCOUNTER — TELEPHONE (OUTPATIENT)
Dept: NEPHROLOGY | Facility: CLINIC | Age: 68
End: 2023-11-17
Payer: MEDICARE

## 2023-11-17 NOTE — TELEPHONE ENCOUNTER
Left Voicemail (2nd Attempt) for the patient to call back and schedule the following:    Appointment type: RNEP  Provider: ELFERING  Return date: AUGUST 2024  Specialty phone number: [phone]  Additional appointment(s) needed: LAB  Additonal Notes: PER CHECKOUT NOTE.

## 2023-11-17 NOTE — TELEPHONE ENCOUNTER
ARPIT and sent [facility] to schedule follow up around 8.9.24 with Dr. Salcedo// 11.17.23 KET

## 2023-11-21 ENCOUNTER — TELEPHONE (OUTPATIENT)
Dept: NEPHROLOGY | Facility: CLINIC | Age: 68
End: 2023-11-21
Payer: MEDICARE

## 2023-11-21 NOTE — TELEPHONE ENCOUNTER
LVM (2ND ATTEMPT) for the patient to call back and schedule the following:    Appointment type: RNEP  Provider: ELFERING  Return date: AUGUST 2024  Specialty phone number: [phone]  Additional appointment(s) needed: LAB  Additonal Notes: NA

## 2024-04-03 ENCOUNTER — PATIENT OUTREACH (OUTPATIENT)
Dept: CARE COORDINATION | Facility: CLINIC | Age: 69
End: 2024-04-03
Payer: MEDICARE

## 2024-05-01 ENCOUNTER — PATIENT OUTREACH (OUTPATIENT)
Dept: CARE COORDINATION | Facility: CLINIC | Age: 69
End: 2024-05-01
Payer: MEDICARE

## 2024-05-15 ENCOUNTER — PATIENT OUTREACH (OUTPATIENT)
Dept: CARE COORDINATION | Facility: CLINIC | Age: 69
End: 2024-05-15
Payer: MEDICARE

## 2024-05-17 ENCOUNTER — ANCILLARY PROCEDURE (OUTPATIENT)
Dept: MAMMOGRAPHY | Facility: CLINIC | Age: 69
End: 2024-05-17
Attending: FAMILY MEDICINE
Payer: MEDICARE

## 2024-05-17 DIAGNOSIS — Z12.31 VISIT FOR SCREENING MAMMOGRAM: ICD-10-CM

## 2024-05-17 PROCEDURE — 77063 BREAST TOMOSYNTHESIS BI: CPT | Performed by: RADIOLOGY

## 2024-05-17 PROCEDURE — 77067 SCR MAMMO BI INCL CAD: CPT | Performed by: RADIOLOGY

## 2024-06-16 SDOH — HEALTH STABILITY: PHYSICAL HEALTH: ON AVERAGE, HOW MANY MINUTES DO YOU ENGAGE IN EXERCISE AT THIS LEVEL?: 30 MIN

## 2024-06-16 SDOH — HEALTH STABILITY: PHYSICAL HEALTH: ON AVERAGE, HOW MANY DAYS PER WEEK DO YOU ENGAGE IN MODERATE TO STRENUOUS EXERCISE (LIKE A BRISK WALK)?: 5 DAYS

## 2024-06-16 ASSESSMENT — ANXIETY QUESTIONNAIRES
8. IF YOU CHECKED OFF ANY PROBLEMS, HOW DIFFICULT HAVE THESE MADE IT FOR YOU TO DO YOUR WORK, TAKE CARE OF THINGS AT HOME, OR GET ALONG WITH OTHER PEOPLE?: NOT DIFFICULT AT ALL
GAD7 TOTAL SCORE: 0
7. FEELING AFRAID AS IF SOMETHING AWFUL MIGHT HAPPEN: NOT AT ALL
7. FEELING AFRAID AS IF SOMETHING AWFUL MIGHT HAPPEN: NOT AT ALL
1. FEELING NERVOUS, ANXIOUS, OR ON EDGE: NOT AT ALL
6. BECOMING EASILY ANNOYED OR IRRITABLE: NOT AT ALL
3. WORRYING TOO MUCH ABOUT DIFFERENT THINGS: NOT AT ALL
5. BEING SO RESTLESS THAT IT IS HARD TO SIT STILL: NOT AT ALL
IF YOU CHECKED OFF ANY PROBLEMS ON THIS QUESTIONNAIRE, HOW DIFFICULT HAVE THESE PROBLEMS MADE IT FOR YOU TO DO YOUR WORK, TAKE CARE OF THINGS AT HOME, OR GET ALONG WITH OTHER PEOPLE: NOT DIFFICULT AT ALL
4. TROUBLE RELAXING: NOT AT ALL
2. NOT BEING ABLE TO STOP OR CONTROL WORRYING: NOT AT ALL

## 2024-06-16 ASSESSMENT — SOCIAL DETERMINANTS OF HEALTH (SDOH): HOW OFTEN DO YOU GET TOGETHER WITH FRIENDS OR RELATIVES?: MORE THAN THREE TIMES A WEEK

## 2024-06-16 ASSESSMENT — PATIENT HEALTH QUESTIONNAIRE - PHQ9
10. IF YOU CHECKED OFF ANY PROBLEMS, HOW DIFFICULT HAVE THESE PROBLEMS MADE IT FOR YOU TO DO YOUR WORK, TAKE CARE OF THINGS AT HOME, OR GET ALONG WITH OTHER PEOPLE: NOT DIFFICULT AT ALL
SUM OF ALL RESPONSES TO PHQ QUESTIONS 1-9: 0
SUM OF ALL RESPONSES TO PHQ QUESTIONS 1-9: 0

## 2024-06-18 DIAGNOSIS — F33.0 MILD RECURRENT MAJOR DEPRESSION (H): ICD-10-CM

## 2024-06-19 ENCOUNTER — OFFICE VISIT (OUTPATIENT)
Dept: FAMILY MEDICINE | Facility: CLINIC | Age: 69
End: 2024-06-19
Payer: MEDICARE

## 2024-06-19 VITALS
OXYGEN SATURATION: 98 % | HEART RATE: 66 BPM | HEIGHT: 69 IN | DIASTOLIC BLOOD PRESSURE: 81 MMHG | WEIGHT: 166 LBS | BODY MASS INDEX: 24.59 KG/M2 | SYSTOLIC BLOOD PRESSURE: 133 MMHG | RESPIRATION RATE: 16 BRPM | TEMPERATURE: 97 F

## 2024-06-19 DIAGNOSIS — N18.31 CHRONIC KIDNEY DISEASE, STAGE 3A (H): ICD-10-CM

## 2024-06-19 DIAGNOSIS — Z00.00 ENCOUNTER FOR ROUTINE ADULT HEALTH EXAMINATION WITHOUT ABNORMAL FINDINGS: Primary | ICD-10-CM

## 2024-06-19 DIAGNOSIS — F33.0 MILD RECURRENT MAJOR DEPRESSION (H): ICD-10-CM

## 2024-06-19 DIAGNOSIS — Z83.3 FAMILY HISTORY OF DIABETES MELLITUS: ICD-10-CM

## 2024-06-19 DIAGNOSIS — Z78.0 ASYMPTOMATIC MENOPAUSAL STATE: ICD-10-CM

## 2024-06-19 LAB
CHOLEST SERPL-MCNC: 208 MG/DL
ERYTHROCYTE [DISTWIDTH] IN BLOOD BY AUTOMATED COUNT: 13.3 % (ref 10–15)
FASTING STATUS PATIENT QL REPORTED: YES
HBA1C MFR BLD: 5.5 % (ref 0–5.6)
HCT VFR BLD AUTO: 38.3 % (ref 35–47)
HDLC SERPL-MCNC: 104 MG/DL
HGB BLD-MCNC: 12.4 G/DL (ref 11.7–15.7)
LDLC SERPL CALC-MCNC: 93 MG/DL
MCH RBC QN AUTO: 29.9 PG (ref 26.5–33)
MCHC RBC AUTO-ENTMCNC: 32.4 G/DL (ref 31.5–36.5)
MCV RBC AUTO: 92 FL (ref 78–100)
NONHDLC SERPL-MCNC: 104 MG/DL
PLATELET # BLD AUTO: 264 10E3/UL (ref 150–450)
RBC # BLD AUTO: 4.15 10E6/UL (ref 3.8–5.2)
TRIGL SERPL-MCNC: 53 MG/DL
TSH SERPL DL<=0.005 MIU/L-ACNC: 4.07 UIU/ML (ref 0.3–4.2)
WBC # BLD AUTO: 6.1 10E3/UL (ref 4–11)

## 2024-06-19 PROCEDURE — 36415 COLL VENOUS BLD VENIPUNCTURE: CPT | Performed by: FAMILY MEDICINE

## 2024-06-19 PROCEDURE — 85027 COMPLETE CBC AUTOMATED: CPT | Performed by: FAMILY MEDICINE

## 2024-06-19 PROCEDURE — 80061 LIPID PANEL: CPT | Performed by: FAMILY MEDICINE

## 2024-06-19 PROCEDURE — 83036 HEMOGLOBIN GLYCOSYLATED A1C: CPT | Mod: GZ | Performed by: FAMILY MEDICINE

## 2024-06-19 PROCEDURE — G0439 PPPS, SUBSEQ VISIT: HCPCS | Performed by: FAMILY MEDICINE

## 2024-06-19 PROCEDURE — 84443 ASSAY THYROID STIM HORMONE: CPT | Performed by: FAMILY MEDICINE

## 2024-06-19 RX ORDER — RESPIRATORY SYNCYTIAL VIRUS VACCINE 120MCG/0.5
0.5 KIT INTRAMUSCULAR ONCE
Qty: 1 EACH | Refills: 0 | Status: CANCELLED | OUTPATIENT
Start: 2024-06-19 | End: 2024-06-19

## 2024-06-19 ASSESSMENT — PAIN SCALES - GENERAL: PAINLEVEL: NO PAIN (0)

## 2024-06-19 NOTE — PROGRESS NOTES
Preventive Care Visit  [facility]  Sara Thomas MD, Family Medicine  Jun 19, 2024      Assessment & Plan     Chronic kidney disease, stage 3a (H)  She sees a kidney specialist tpaulant in August 2024   Will have labs done prior     Encounter for routine adult health examination without abnormal findings  Discussed diet,calcium,exercise.Went over self breast exam.Thin prep was NOT done.Eyes and teeth UTD.No immunizations needed today.See orders below for tests ordered and screening needed.  She is fasting for labs today.  - REVIEW OF HEALTH MAINTENANCE PROTOCOL ORDERS  - Lipid panel reflex to direct LDL Non-fasting; Future  - TSH with free T4 reflex; Future  - CBC with platelets; Future  - Lipid panel reflex to direct LDL Non-fasting  - TSH with free T4 reflex  - CBC with platelets    Family history of diabetes mellitus  Will screen hemoglobin A1c  - Hemoglobin A1c; Future  - Hemoglobin A1c    Asymptomatic menopausal state  Patient is due for bone density scan and order placed in her chart.  - DEXA HIP/PELVIS/SPINE - Future; Future    Mild recurrent major depression (H24)  She is doing well on fluoxetine 60 mg daily, no side effects, her depression and anxiety are well-controlled on the current dose.  I have refilled her prescription.    - FLUoxetine (PROZAC) 20 MG capsule; Take 3 capsules (60 mg) by mouth daily    Patient has been advised of split billing requirements and indicates understanding: Yes        Counseling  Appropriate preventive services were discussed with this patient, including applicable screening as appropriate for fall prevention, nutrition, physical activity, Tobacco-use cessation, weight loss and cognition.  Checklist reviewing preventive services available has been given to the patient.  Reviewed patient's diet, addressing concerns and/or questions.   She is at risk for psychosocial distress and has been provided with information to reduce risk.            Iain Tay is a 69 year old, presenting for the following:  Physical  Dr Ryan Mason psych Rx Ritalin 20 mg BID or TID         Health Care Directive  Patient does not have a Health Care Directive or Living Will:     HPI        Depression  Chronic kidney disease stage III yea    Asymptomatic menopausal state needs a bone density scan    Family history of diabetes type 2        6/16/2024   General Health   How would you rate your overall physical health? Excellent   Feel stress (tense, anxious, or unable to sleep) Only a little      (!) STRESS CONCERN      6/16/2024   Nutrition   Diet: Regular (no restrictions)            6/16/2024   Exercise   Days per week of moderate/strenous exercise 5 days   Average minutes spent exercising at this level 30 min            6/16/2024   Social Factors   Frequency of gathering with friends or relatives More than three times a week   Worry food won't last until get money to buy more No   Food not last or not have enough money for food? No   Do you have housing? (Housing is defined as stable permanent housing and does not include staying ouside in a car, in a tent, in an abandoned building, in an overnight shelter, or couch-surfing.) Yes   Are you worried about losing your housing? No   Lack of transportation? No   Unable to get utilities (heat,electricity)? No            6/16/2024   Fall Risk   Fallen 2 or more times in the past year? No    No   Trouble with walking or balance? No    No       Multiple values from one day are sorted in reverse-chronological order          6/16/2024   Activities of Daily Living- Home Safety   Needs help with the following daily activites None of the above   Safety concerns in the home None of the above            6/16/2024   Dental   Dentist two times every year? Yes            6/16/2024   Hearing Screening   Hearing concerns? None of the above            6/16/2024   Driving Risk Screening   Patient/family members have concerns about  driving No            6/16/2024   General Alertness/Fatigue Screening   Have you been more tired than usual lately? No            6/16/2024   Urinary Incontinence Screening   Bothered by leaking urine in past 6 months No            6/16/2024   TB Screening   Were you born outside of the [place]? No          Today's PHQ-9 Score:       6/16/2024     7:56 PM   PHQ-9 SCORE   PHQ-9 Total Score MyChart 0   PHQ-9 Total Score 0         6/16/2024   Substance Use   Alcohol more than 3/day or more than 7/wk No   Do you have a current opioid prescription? No   How severe/bad is pain from 1 to 10? 1/10   Do you use any other substances recreationally? (!) DECLINE        Social History     Tobacco Use    Smoking status: Never    Smokeless tobacco: Never   Substance Use Topics    Alcohol use: Yes     Alcohol/week: 0.0 standard drinks of alcohol     Comment: 1 glass wine per month    Drug use: No           5/17/2024   LAST FHS-7 RESULTS   1st degree relative breast or ovarian cancer No   Any relative bilateral breast cancer No   Any male have breast cancer No   Any ONE woman have BOTH breast AND ovarian cancer No   Any woman with breast cancer before 50yrs No   2 or more relatives with breast AND/OR ovarian cancer No   2 or more relatives with breast AND/OR bowel cancer No           Mammogram Screening - Mammogram every 1-2 years updated in Health Maintenance based on mutual decision making      History of abnormal Pap smear: No - age 65 or older with adequate negative prior screening test results (3 consecutive negative cytology results, 2 consecutive negative cotesting results, or 2 consecutive negative HrHPV test results within 10 years, with the most recent test occurring within the recommended screening interval for the test used)        Latest Ref Rng & Units 2/8/2019     4:11 PM 2/8/2019     3:55 PM 3/31/2016     1:53 PM   PAP / HPV   PAP (Historical)   NIL     HPV 16 DNA NEG^Negative Negative   Negative    HPV 18 DNA  NEG^Negative Negative   Negative    Other HR HPV NEG^Negative Negative   Negative      ASCVD Risk   The ASCVD Risk score (Nathalia ROSADO, et al., 2019) failed to calculate for the following reasons:    The valid HDL cholesterol range is 20 to 100 mg/dL            Reviewed and updated as needed this visit by Provider                    Past Medical History:   Diagnosis Date    Anxiety state, unspecified     Attention deficit disorder without mention of hyperactivity     Disorder of bone and cartilage, unspecified 4/03    Mild recurrent major depression (H24) 3/9/2009    Other diseases of lung, not elsewhere classified     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    Other specified viral warts     Ovarian cyst     Unspecified essential hypertension     Unspecified symptom associated with female genital organs     Urinary tract infection, site not specified      Past Surgical History:   Procedure Laterality Date    DILATION AND CURETTAGE  8/21/2013    Procedure: DILATION AND CURETTAGE;;  Surgeon: Tamar Walsh MD;  Location: [facility] OR    LAPAROSCOPIC SALPINGO-OOPHORECTOMY  8/21/2013    Procedure: LAPAROSCOPIC SALPINGO-OOPHORECTOMY;  Laparoscopic Bilateral Salpingo-Oophorectomy, Pelvic washings, Dilation and Curettage;  Surgeon: Tamar Walsh MD;  Location:  OR    LUMPECTOMY BREAST Bilateral 5/31/2016    Procedure: LUMPECTOMY BREAST;  Surgeon: Barney Givens MD;  Location:  OR     Lab work is in process  Labs reviewed in EPIC  BP Readings from Last 3 Encounters:   06/19/24 133/81   08/09/23 123/78   05/31/23 129/80    Wt Readings from Last 3 Encounters:   06/19/24 75.3 kg (166 lb)   08/09/23 72.8 kg (160 lb 6.4 oz)   05/31/23 72.1 kg (159 lb)                  Patient Active Problem List   Diagnosis    Disorder of bone and cartilage    Disease of lung    Mild recurrent major depression (H24)    CARDIOVASCULAR SCREENING; LDL GOAL LESS THAN 160    Hypertension goal BP (blood pressure) < 140/90     Discharge from left nipple    Cervical lymphadenopathy    Elevated serum creatinine    Benign essential hypertension    Chronic kidney disease, stage 3a (H)     Past Surgical History:   Procedure Laterality Date    DILATION AND CURETTAGE  8/21/2013    Procedure: DILATION AND CURETTAGE;;  Surgeon: Tamar Walsh MD;  [facility]    LAPAROSCOPIC SALPINGO-OOPHORECTOMY  8/21/2013    Procedure: LAPAROSCOPIC SALPINGO-OOPHORECTOMY;  Laparoscopic Bilateral Salpingo-Oophorectomy, Pelvic washings, Dilation and Curettage;  Surgeon: Tamar Walsh MD;  Location: [facility] OR    LUMPECTOMY BREAST Bilateral 5/31/2016    Procedure: LUMPECTOMY BREAST;  Surgeon: Barney Givens MD;  Location:  OR       Social History     Tobacco Use    Smoking status: Never    Smokeless tobacco: Never   Substance Use Topics    Alcohol use: Yes     Alcohol/week: 0.0 standard drinks of alcohol     Comment: 1 glass wine per month     Family History   Problem Relation Age of Onset    Arthritis Mother     Osteoporosis Mother         at age 75    Heart Disease Mother         arhythmia    Allergies Mother         seasonal    Alzheimer Disease Mother         at age 81    C.A.D. Father         bypass at age 78 and Pace maker at age 90    Diabetes Father         type 2    Heart Disease Father     Gastrointestinal Disease Father         ulcers    Coronary Artery Disease Father     Breast Cancer Maternal Grandmother         at age 40's    Cerebrovascular Disease Maternal Grandfather         at age 70's    C.A.D. Paternal Grandmother         congestive heart failure    Respiratory Brother         bronchiestis    Coronary Artery Disease Brother          Current Outpatient Medications   Medication Sig Dispense Refill    FLUoxetine (PROZAC) 20 MG capsule Take 3 capsules (60 mg) by mouth daily 90 capsule 4    albuterol (PROAIR HFA/PROVENTIL HFA/VENTOLIN HFA) 108 (90 Base) MCG/ACT inhaler Inhale 2 puffs into the lungs every 6 hours as needed for  shortness of breath or wheezing 18 g 4    methylphenidate (RITALIN) 10 MG tablet Take 10 mg by mouth 2 times daily 1.5 in AM, 1.5 NOON, 20mg in PM if needed.      REFRESH 1 % OP SOLN as needed (Patient not taking: Reported on 8/9/2023) 1 0     Allergies   Allergen Reactions    No Known Allergies      Recent Labs   Lab Test 06/19/24  0745 08/09/23  1228 05/31/23  0741 05/11/22  1224 03/30/22  0942 12/30/20  0809 01/10/20  0741 11/27/19  0835 09/10/19  1430 01/16/19  1422 10/31/17  1025   A1C 5.5  --   --   --   --  5.3  --   --   --   --  5.2   LDL 93  --  97  --  105* 94  --   --  124*  --  85     --  101  --  109 120  --   --  95  --  111   TRIG 53  --  65  --  59 64  --   --  57  --  56   ALT  --   --  26  --  26  --   --   --  25  --  47   CR  --  1.17* 1.06*   < > 1.14* 1.08* 1.28*   < > 1.09*   < > 1.25*   GFRESTIMATED  --  50* 57*   < > 53* 53* 44*   < > 53*   < > 43*   GFRESTBLACK  --   --   --   --   --  62 51*   < > 62   < > 52*   POTASSIUM  --  4.5 4.4   < > 4.8 4.8 4.5   < > 4.4   < > 4.6   TSH 4.07  --  3.55  --   --   --   --   --   --    < >  --     < > = values in this interval not displayed.      Current providers sharing in care for this patient include:  Patient Care Team:  Sara Thomas MD as PCP - General (Family Practice)  Sara Thomas MD as Assigned PCP  Siomara Salcedo MD as MD (Nephrology)  Siomara Salcedo MD as Assigned Nephrology Provider    The following health maintenance items are reviewed in Epic and correct as of today:  Health Maintenance   Topic Date Due    RSV VACCINE (Pregnancy & 60+) (1 - 1-dose 60+ series) Never done    DEXA  05/20/2019    MICROALBUMIN  03/30/2023    ANNUAL REVIEW OF HM ORDERS  03/30/2023    COVID-19 Vaccine (4 - 2023-24 season) 09/01/2023    LIPID  05/31/2024    MEDICARE ANNUAL WELLNESS VISIT  05/31/2024    HEMOGLOBIN  08/09/2024    BMP  08/09/2024    INFLUENZA VACCINE (Season Ended) 09/01/2024    PHQ-9  12/19/2024    MAMMO SCREENING   "05/17/2025    FALL RISK ASSESSMENT  06/19/2025    COLORECTAL CANCER SCREENING  07/01/2026    GLUCOSE  08/09/2026    DTAP/TDAP/TD IMMUNIZATION (3 - Td or Tdap) 10/31/2027    ADVANCE CARE PLANNING  05/31/2028    HEPATITIS C SCREENING  Completed    DEPRESSION ACTION PLAN  Completed    Pneumococcal Vaccine: 65+ Years  Completed    URINALYSIS  Completed    ZOSTER IMMUNIZATION  Completed    IPV IMMUNIZATION  Aged Out    HPV IMMUNIZATION  Aged Out    MENINGITIS IMMUNIZATION  Aged Out    RSV MONOCLONAL ANTIBODY  Aged Out         Review of Systems  Constitutional, HEENT, cardiovascular, pulmonary, GI, , musculoskeletal, neuro, skin, endocrine and psych systems are negative, except as otherwise noted.     Objective    Exam  /81   Pulse 66   Temp 97  F (36.1  C) (Tympanic)   Resp 16   Ht 1.74 m (5' 8.5\")   Wt 75.3 kg (166 lb)   LMP 08/15/2010   SpO2 98%   BMI 24.87 kg/m     Estimated body mass index is 24.87 kg/m  as calculated from the following:    Height as of this encounter: 1.74 m (5' 8.5\").    Weight as of this encounter: 75.3 kg (166 lb).    Physical Exam  GENERAL: alert and no distress  EYES: Eyes grossly normal to inspection, PERRL and conjunctivae and sclerae normal  HENT: ear canals and TM's normal, nose and mouth without ulcers or lesions  NECK: no adenopathy, no asymmetry, masses, or scars  RESP: lungs clear to auscultation - no rales, rhonchi or wheezes  CV: regular rate and rhythm, normal S1 S2, no S3 or S4, no murmur, click or rub, no peripheral edema  ABDOMEN: soft, nontender, no hepatosplenomegaly, no masses and bowel sounds normal  MS: no gross musculoskeletal defects noted, no edema  SKIN: no suspicious lesions or rashes  NEURO: Normal strength and tone, mentation intact and speech normal  PSYCH: mentation appears normal, affect normal/bright        6/19/2024   Mini Cog   Clock Draw Score 2 Normal                 Signed Electronically by: Sara Thomas MD    "

## 2024-07-02 ENCOUNTER — ANCILLARY PROCEDURE (OUTPATIENT)
Dept: BONE DENSITY | Facility: CLINIC | Age: 69
End: 2024-07-02
Attending: FAMILY MEDICINE
Payer: MEDICARE

## 2024-07-02 DIAGNOSIS — Z78.0 ASYMPTOMATIC MENOPAUSAL STATE: ICD-10-CM

## 2024-07-02 PROCEDURE — 77080 DXA BONE DENSITY AXIAL: CPT | Performed by: INTERNAL MEDICINE

## 2024-07-30 DIAGNOSIS — N18.31 CHRONIC KIDNEY DISEASE, STAGE 3A (H): Primary | ICD-10-CM

## 2024-08-14 ENCOUNTER — OFFICE VISIT (OUTPATIENT)
Dept: NEPHROLOGY | Facility: CLINIC | Age: 69
End: 2024-08-14
Attending: INTERNAL MEDICINE
Payer: MEDICARE

## 2024-08-14 ENCOUNTER — LAB (OUTPATIENT)
Dept: LAB | Facility: CLINIC | Age: 69
End: 2024-08-14
Attending: INTERNAL MEDICINE
Payer: MEDICARE

## 2024-08-14 VITALS
OXYGEN SATURATION: 97 % | WEIGHT: 165.5 LBS | BODY MASS INDEX: 24.8 KG/M2 | SYSTOLIC BLOOD PRESSURE: 148 MMHG | DIASTOLIC BLOOD PRESSURE: 81 MMHG | HEART RATE: 80 BPM

## 2024-08-14 DIAGNOSIS — R03.0 WHITE COAT SYNDROME WITHOUT DIAGNOSIS OF HYPERTENSION: ICD-10-CM

## 2024-08-14 DIAGNOSIS — N18.31 CHRONIC KIDNEY DISEASE, STAGE 3A (H): Primary | ICD-10-CM

## 2024-08-14 LAB
ALBUMIN MFR UR ELPH: 6.1 MG/DL
ALBUMIN SERPL BCG-MCNC: 4.1 G/DL (ref 3.5–5.2)
ALBUMIN UR-MCNC: NEGATIVE MG/DL
ANION GAP SERPL CALCULATED.3IONS-SCNC: 9 MMOL/L (ref 7–15)
APPEARANCE UR: CLEAR
BILIRUB UR QL STRIP: NEGATIVE
BUN SERPL-MCNC: 21 MG/DL (ref 8–23)
CALCIUM SERPL-MCNC: 9.4 MG/DL (ref 8.8–10.4)
CHLORIDE SERPL-SCNC: 103 MMOL/L (ref 98–107)
COLOR UR AUTO: ABNORMAL
CREAT SERPL-MCNC: 1.18 MG/DL (ref 0.51–0.95)
CREAT UR-MCNC: 91.2 MG/DL
CREAT UR-MCNC: 92.3 MG/DL
EGFRCR SERPLBLD CKD-EPI 2021: 49 ML/MIN/1.73M2
ERYTHROCYTE [DISTWIDTH] IN BLOOD BY AUTOMATED COUNT: 13.4 % (ref 10–15)
GLUCOSE SERPL-MCNC: 100 MG/DL (ref 70–99)
GLUCOSE UR STRIP-MCNC: NEGATIVE MG/DL
HCO3 SERPL-SCNC: 27 MMOL/L (ref 22–29)
HCT VFR BLD AUTO: 36.9 % (ref 35–47)
HGB BLD-MCNC: 11.8 G/DL (ref 11.7–15.7)
HGB UR QL STRIP: NEGATIVE
KETONES UR STRIP-MCNC: NEGATIVE MG/DL
LEUKOCYTE ESTERASE UR QL STRIP: NEGATIVE
MCH RBC QN AUTO: 30 PG (ref 26.5–33)
MCHC RBC AUTO-ENTMCNC: 32 G/DL (ref 31.5–36.5)
MCV RBC AUTO: 94 FL (ref 78–100)
MICROALBUMIN UR-MCNC: <12 MG/L
MICROALBUMIN/CREAT UR: NORMAL MG/G{CREAT}
MUCOUS THREADS #/AREA URNS LPF: PRESENT /LPF
NITRATE UR QL: NEGATIVE
PH UR STRIP: 5.5 [PH] (ref 5–7)
PHOSPHATE SERPL-MCNC: 3.3 MG/DL (ref 2.5–4.5)
PLATELET # BLD AUTO: 240 10E3/UL (ref 150–450)
POTASSIUM SERPL-SCNC: 4.7 MMOL/L (ref 3.4–5.3)
PROT/CREAT 24H UR: 0.07 MG/MG CR (ref 0–0.2)
RBC # BLD AUTO: 3.93 10E6/UL (ref 3.8–5.2)
RBC URINE: <1 /HPF
SODIUM SERPL-SCNC: 139 MMOL/L (ref 135–145)
SP GR UR STRIP: 1.01 (ref 1–1.03)
SQUAMOUS EPITHELIAL: <1 /HPF
UROBILINOGEN UR STRIP-MCNC: NORMAL MG/DL
WBC # BLD AUTO: 5.7 10E3/UL (ref 4–11)
WBC URINE: <1 /HPF

## 2024-08-14 PROCEDURE — 81001 URINALYSIS AUTO W/SCOPE: CPT | Performed by: PATHOLOGY

## 2024-08-14 PROCEDURE — 80069 RENAL FUNCTION PANEL: CPT | Performed by: PATHOLOGY

## 2024-08-14 PROCEDURE — G0463 HOSPITAL OUTPT CLINIC VISIT: HCPCS | Performed by: INTERNAL MEDICINE

## 2024-08-14 PROCEDURE — 82043 UR ALBUMIN QUANTITATIVE: CPT | Performed by: FAMILY MEDICINE

## 2024-08-14 PROCEDURE — 84156 ASSAY OF PROTEIN URINE: CPT | Performed by: PATHOLOGY

## 2024-08-14 PROCEDURE — 85027 COMPLETE CBC AUTOMATED: CPT | Performed by: PATHOLOGY

## 2024-08-14 PROCEDURE — 36415 COLL VENOUS BLD VENIPUNCTURE: CPT | Performed by: PATHOLOGY

## 2024-08-14 PROCEDURE — 99213 OFFICE O/P EST LOW 20 MIN: CPT | Performed by: INTERNAL MEDICINE

## 2024-08-14 PROCEDURE — 99000 SPECIMEN HANDLING OFFICE-LAB: CPT | Performed by: PATHOLOGY

## 2024-08-14 ASSESSMENT — PAIN SCALES - GENERAL: PAINLEVEL: NO PAIN (0)

## 2024-08-14 NOTE — PROGRESS NOTES
Nephrology Clinic Visit 08/14/2024     Assessment and Plan:    # CKD3 w/o proteinuria -   Etiology unclear (no h/o prematurity, preeclampsia, structural abnormality), though may be due to HTN as she had episode of very high blood pressure in the past.  Baseline creat 1.1-1.3 since 2016.    - Additional w/u was neg including SPEP, Light chain ratio. She has no proteinuria and her renal US was unremarkable.    - Blood pressure at goal running ~110 systolic at home  - CKD stable with eGFR 49 and no proteinuria  - given lack of proteinuria and lack of diabetes, will not add SGLT2 inhibitor as the DAPA CKD trial only included people with proteinuria. CKD has been stable and BP is normal so have opted against ACE inhibitor.    # white coat HTN with normal BP at home -  controlled with diet/exercise  - BP at goal without medication   - Continue to monitor home blood pressures.    # history of iron deficiency, hemoglobin normal at 12.4 6/2024 and 11.8 today    # hyperglycemia - mild - Hgb A1C 5.5 6/19/2024    Return in about 1 year (around 8/14/2025).     Assessment and plan was discussed with patient and she voiced her understanding and agreement.    Siomara Salcedo MD  [facility]  Department of Medicine  Division of Nephrology and Hypertension  [facility] [place]  myairmail  Vocera Web Console     Reason for Visit:  CKD3/HTN    HPI:  Maggi Reynolds is a 69 year old  with CKD3, HTN, Anemia. In past, creatinine improved with discontinuation of ACE inhibitor    Last visit 8/9/2023  No new health issues since last visit, no hospitalizations. Overall doing well.  Home blood pressures 110/60 but lately a bit wound up about traveling and has had some higher numbers.     ROS: 10 point ROS neg other than the symptoms noted above in the HPI.     Chronic Health Problems:  CKD3  HTN  Ammonia exposure when train derailed   Depression  Anemia  ADD    Family Hx:   Family History   Problem Relation Age of Onset     Arthritis Mother     Osteoporosis Mother         at age 75    Heart Disease Mother         arhythmia    Allergies Mother         seasonal    Alzheimer Disease Mother         at age 81    C.A.D. Father         bypass at age 78 and Pace maker at age 90    Diabetes Father         type 2    Heart Disease Father     Gastrointestinal Disease Father         ulcers    Coronary Artery Disease Father     Breast Cancer Maternal Grandmother         at age 40's    Cerebrovascular Disease Maternal Grandfather         at age 70's    C.A.D. 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 Walking daily  [place], may have herbicide (Glyphosate)/pesticide exposure.   Anhydrous ammonium exposure in 2010.    Allergies:  Allergies   Allergen Reactions    No Known Allergies        Medications:  Current Outpatient Medications   Medication Sig Dispense Refill    albuterol (PROAIR HFA/PROVENTIL HFA/VENTOLIN HFA) 108 (90 Base) MCG/ACT inhaler Inhale 2 puffs into the lungs every 6 hours as needed for shortness of breath or wheezing 18 g 4    FLUoxetine (PROZAC) 20 MG capsule Take 3 capsules (60 mg) by mouth daily 90 capsule 4    methylphenidate (RITALIN) 10 MG tablet Take 10 mg by mouth 2 times daily 1.5 in AM, 1.5 NOON, 20mg in PM if needed.       No current facility-administered medications for this visit.      Vitals:   BP (!) 148/81   Pulse 80   Wt 75.1 kg (165 lb 8 oz)   LMP 08/15/2010   SpO2 97%   BMI 24.80 kg/m     Exam:  GENERAL APPEARANCE: alert and no distress  EYES: no scleral icterus, pupils equal  HENT: NC/AT  Pulmonary: normal work of breathing, no clubbing  CV: WWP   - Edema none  SKIN: no rash, warm, dry, no cyanosis  NEURO: mentation intact and speech normal     LABS:   [facility]  Recent Labs   Lab Test 08/09/23  1228  05/31/23  0741 05/11/22  1224 03/30/22  0942 12/30/20  0809 01/10/20  0741 12/02/19  0732 11/27/19  0835    141 140 139 139 137 135 138   POTASSIUM 4.5 4.4 4.6 4.8 4.8 4.5 4.4 4.5   CHLORIDE 105 107 106 107 108 105 102 107   CO2 27 24 29 29 28 27 27 27   ANIONGAP 8 10 5 3 3 5 6 5   * 100* 98 91 98 98 116* 93   BUN 18.6 26.8* 20 24 15 23 19 29   CR 1.17* 1.06* 1.03 1.14* 1.08* 1.28* 1.27* 1.63*   GFRESTIMATED 50* 57* 59* 53* 53* 44* 44* 33*   GFRESTBLACK  --   --   --   --  62 51* 51* 38*   MANJIT 9.3 8.9 9.4 8.6 9.3 8.7 9.2 8.9     Recent Labs   Lab Test 05/31/23  0741 03/30/22  0942 09/10/19  1430 10/31/17  1025   BILITOTAL 0.2 1.1 0.3 0.5   ALKPHOS 74 87 84 118   ALT 26 26 25 47   AST 43* 20 19 26     CBC  Recent Labs   Lab Test 06/19/24  0745 08/09/23  1228 05/31/23  0741 05/11/22  1224 03/30/22  0942 03/30/22  0942 12/30/20  0809   HGB 12.4 11.8 11.9 11.8   < > 12.1 12.5   WBC 6.1  --  6.1  --   --  5.2 5.2   RBC 4.15  --  3.98  --   --  4.00 4.22   HCT 38.3  --  36.5  --   --  37.5 39.5   MCV 92  --  92  --   --  94 94   MCH 29.9  --  29.9  --   --  30.3 29.6   MCHC 32.4  --  32.6  --   --  32.3 31.6   RDW 13.3  --  13.1  --   --  13.2 13.2     --  216  --   --  260 261    < > = values in this interval not displayed.     URINE STUDIES  Recent Labs   Lab Test 11/27/19  0842   COLOR Yellow   APPEARANCE Slightly Cloudy   URINEGLC Negative   URINEBILI Negative   URINEKETONE 5*   SG 1.021   UBLD Negative   URINEPH 5.0   PROTEIN Negative   NITRITE Negative   LEUKEST Negative   RBCU 1   WBCU 2     Recent Labs   Lab Test 01/10/20  0756 11/27/19  0842   UTPG 0.06 0.04     PTH  Recent Labs   Lab Test 05/11/22  1224 11/27/19  0835   PTHI 51 59     IRON STUDIES  Recent Labs   Lab Test 12/30/20  0809 11/27/19  0835   IRON 70 47    259   IRONSAT 24 18   ARACELI 83 108       Siomara Salcedo MD

## 2024-08-14 NOTE — LETTER
8/14/2024       RE: Maggi Reynolds  [address]  [place] [address]     Dear Colleague,    Thank you for referring your patient, Maggi Reynolds, to the [facility] [place] at [facility]. Please see a copy of my visit note below.    Nephrology Clinic Visit 08/14/2024     Assessment and Plan:    # CKD3 w/o proteinuria -   Etiology unclear (no h/o prematurity, preeclampsia, structural abnormality), though may be due to HTN as she had episode of very high blood pressure in the past.  Baseline creat 1.1-1.3 since 2016.    - Additional w/u was neg including SPEP, Light chain ratio. She has no proteinuria and her renal US was unremarkable.    - Blood pressure at goal running ~110 systolic at home  - CKD stable with eGFR 49 and no proteinuria  - given lack of proteinuria and lack of diabetes, will not add SGLT2 inhibitor as the DAPA CKD trial only included people with proteinuria. CKD has been stable and BP is normal so have opted against ACE inhibitor.    # white coat HTN with normal BP at home -  controlled with diet/exercise  - BP at goal without medication   - Continue to monitor home blood pressures.    # history of iron deficiency, hemoglobin normal at 12.4 6/2024 and 11.8 today    # hyperglycemia - mild - Hgb A1C 5.5 6/19/2024    Return in about 1 year (around 8/14/2025).     Assessment and plan was discussed with patient and she voiced her understanding and agreement.    Siomara Salcedo MD  [facility]  Department of Medicine  Division of Nephrology and Hypertension  [facility]  [employer] Web Console     Reason for Visit:  CKD3/HTN    HPI:  Maggi Reynolds is a 69 year old  with CKD3, HTN, Anemia. In past, creatinine improved with discontinuation of ACE inhibitor    Last visit 8/9/2023  No new health issues since last visit, no hospitalizations. Overall doing well.  Home blood  pressures 110/60 but lately a bit wound up about traveling and has had some higher numbers.     ROS: 10 point ROS neg other than the symptoms noted above in the HPI.     Chronic Health Problems:  CKD3  HTN  Ammonia exposure when train derailed   Depression  Anemia  ADD    Family Hx:   Family History   Problem Relation Age of Onset    Arthritis Mother     Osteoporosis Mother         at age 75    Heart Disease Mother         arhythmia    Allergies Mother         seasonal    Alzheimer Disease Mother         at age 81    C.A.D. Father         bypass at age 78 and Pace maker at age 90    Diabetes Father         type 2    Heart Disease Father     Gastrointestinal Disease Father         ulcers    Coronary Artery Disease Father     Breast Cancer Maternal Grandmother         at age 40's    Cerebrovascular Disease Maternal Grandfather         at age 70's    C.A.D. 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 Walking daily  Grew up [place] a farm, may have herbicide (Glyphosate)/pesticide exposure.   Anhydrous ammonium exposure in 2010.    Allergies:  Allergies   Allergen Reactions    No Known Allergies        Medications:  Current Outpatient Medications   Medication Sig Dispense Refill    albuterol (PROAIR HFA/PROVENTIL HFA/VENTOLIN HFA) 108 (90 Base) MCG/ACT inhaler Inhale 2 puffs into the lungs every 6 hours as needed for shortness of breath or wheezing 18 g 4    FLUoxetine (PROZAC) 20 MG capsule Take 3 capsules (60 mg) by mouth daily 90 capsule 4    methylphenidate (RITALIN) 10 MG tablet Take 10 mg by mouth 2 times daily 1.5 in AM, 1.5 NOON, 20mg in PM if needed.       No current facility-administered medications for this visit.      Vitals:   BP (!) 148/81   Pulse 80   Wt 75.1 kg (165 lb 8 oz)   LMP 08/15/2010   SpO2 97%    BMI 24.80 kg/m     Exam:  GENERAL APPEARANCE: alert and no distress  EYES: no scleral icterus, pupils equal  HENT: NC/AT  Pulmonary: normal work of breathing, no clubbing  CV: WWP   - Edema none  SKIN: no rash, warm, dry, no cyanosis  NEURO: mentation intact and speech normal     LABS:   CMP  Recent Labs   Lab Test 08/09/23  1228 05/31/23  0741 05/11/22  1224 03/30/22  0942 12/30/20  0809 01/10/20  0741 12/02/19  0732 11/27/19  0835    141 140 139 139 137 135 138   POTASSIUM 4.5 4.4 4.6 4.8 4.8 4.5 4.4 4.5   CHLORIDE 105 107 106 107 108 105 102 107   CO2 27 24 29 29 28 27 27 27   ANIONGAP 8 10 5 3 3 5 6 5   * 100* 98 91 98 98 116* 93   BUN 18.6 26.8* 20 24 15 23 19 29   CR 1.17* 1.06* 1.03 1.14* 1.08* 1.28* 1.27* 1.63*   GFRESTIMATED 50* 57* 59* 53* 53* 44* 44* 33*   GFRESTBLACK  --   --   --   --  62 51* 51* 38*   MANJIT 9.3 8.9 9.4 8.6 9.3 8.7 9.2 8.9     Recent Labs   Lab Test 05/31/23  0741 03/30/22  0942 09/10/19  1430 10/31/17  1025   BILITOTAL 0.2 1.1 0.3 0.5   ALKPHOS 74 87 84 118   ALT 26 26 25 47   AST 43* 20 19 26     CBC  Recent Labs   Lab Test 06/19/24  0745 08/09/23  1228 05/31/23  0741 05/11/22  1224 03/30/22  0942 03/30/22  0942 12/30/20  0809   HGB 12.4 11.8 11.9 11.8   < > 12.1 12.5   WBC 6.1  --  6.1  --   --  5.2 5.2   RBC 4.15  --  3.98  --   --  4.00 4.22   HCT 38.3  --  36.5  --   --  37.5 39.5   MCV 92  --  92  --   --  94 94   MCH 29.9  --  29.9  --   --  30.3 29.6   MCHC 32.4  --  32.6  --   --  32.3 31.6   RDW 13.3  --  13.1  --   --  13.2 13.2     --  216  --   --  260 261    < > = values in this interval not displayed.     URINE STUDIES  Recent Labs   Lab Test 11/27/19  0842   COLOR Yellow   APPEARANCE Slightly Cloudy   URINEGLC Negative   URINEBILI Negative   URINEKETONE 5*   SG 1.021   UBLD Negative   URINEPH 5.0   PROTEIN Negative   NITRITE Negative   LEUKEST Negative   RBCU 1   WBCU 2     Recent Labs   Lab Test 01/10/20  0756 11/27/19  0842   UTPG 0.06 0.04      PTH  Recent Labs   Lab Test 05/11/22  1224 11/27/19  0835   PTHI 51 59     IRON STUDIES  Recent Labs   Lab Test 12/30/20  0809 11/27/19  0835   IRON 70 47    259   IRONSAT 24 18   ARACELI 83 108       Siomara Salcedo MD

## 2024-08-14 NOTE — NURSING NOTE
Chief Complaint   Patient presents with    RECHECK     Follow up. Stated she had a bone density and PCP suggested calcium and vitamin D.      Vitals:    08/14/24 1221 08/14/24 1222 08/14/24 1223   BP: (!) 143/79 (!) 154/76 (!) 148/81   BP Location: Right arm     Patient Position: Sitting     Cuff Size: Adult Regular     Pulse: 80     SpO2: 97%     Weight: 75.1 kg (165 lb 8 oz)         BP Readings from Last 3 Encounters:   08/14/24 (!) 148/81   06/19/24 133/81   08/09/23 123/78       BP (!) 148/81   Pulse 80   Wt 75.1 kg (165 lb 8 oz)   LMP 08/15/2010   SpO2 97%   BMI 24.80 kg/m       Renetta Rizvi

## 2024-08-14 NOTE — PATIENT INSTRUCTIONS
Vitamin D - you can take 1000 - 2000 units per day (25-50 mcg)    Calcium - usually twice a day - any over the counter calcium supplement - may have Vitamin D combined.    Return in about 1 year (around 8/14/2025).

## 2024-09-04 ENCOUNTER — TELEPHONE (OUTPATIENT)
Dept: NEPHROLOGY | Facility: CLINIC | Age: 69
End: 2024-09-04
Payer: MEDICARE

## 2024-09-04 NOTE — TELEPHONE ENCOUNTER
Left Voicemail (1st Attempt) for the patient to call back and schedule the following:    Appointment type: RNEP  Provider: ELFERING  Return date: 08/2025  Specialty phone number: [phone]  Additional appointment(s) needed: LABS  Additonal Notes: N/A

## 2024-09-10 ENCOUNTER — TELEPHONE (OUTPATIENT)
Dept: NEPHROLOGY | Facility: CLINIC | Age: 69
End: 2024-09-10
Payer: MEDICARE

## 2024-09-10 NOTE — TELEPHONE ENCOUNTER
Sent [employer] (1st Attempt) and Left Voicemail (2nd Attempt) for the patient to call back and schedule the following:    Appointment type: Return Nephrology  Provider: Dr. Salcedo  Return date: Approx Aug 2025  Specialty phone number: [phone]  Additional appointment(s) needed: Labs prior  Additonal Notes: 1 yr follow up

## 2024-09-15 DIAGNOSIS — F33.0 MILD RECURRENT MAJOR DEPRESSION (H): ICD-10-CM

## 2024-10-04 DIAGNOSIS — F33.0 MILD RECURRENT MAJOR DEPRESSION (H): ICD-10-CM

## 2024-10-14 DIAGNOSIS — F33.0 MILD RECURRENT MAJOR DEPRESSION (H): ICD-10-CM

## 2024-10-14 NOTE — TELEPHONE ENCOUNTER
Spoke to pharmacist regarding below Rx:    FLUoxetine (PROZAC) 20 MG capsule   Take 3 capsules (60 mg) by mouth daily     270 filled in June - 3 month supply    90 tablets left - which is a one month supply    Patient requesting 3 month supply if possible - 270 capsules at a time    Please advise    Thanks,  Emilee MONROY RN

## 2024-10-14 NOTE — TELEPHONE ENCOUNTER
Pt calling for refill of fluoxetine 20MG   Informed pt there are refills on file   Pt states pharmacy does not show that    States she last picked up on 6/20/24 for 90 days (not 30day supply with 4 refills as indicated on last rx).     Please call pharmacy to clarify refills and if insurance requires/prefers 90 day supply?     Thank you  Jessica WILKS RN

## 2025-01-08 DIAGNOSIS — F33.0 MILD RECURRENT MAJOR DEPRESSION: ICD-10-CM

## 2025-02-03 ENCOUNTER — NURSE TRIAGE (OUTPATIENT)
Dept: FAMILY MEDICINE | Facility: CLINIC | Age: 70
End: 2025-02-03

## 2025-02-03 ENCOUNTER — OFFICE VISIT (OUTPATIENT)
Dept: FAMILY MEDICINE | Facility: CLINIC | Age: 70
End: 2025-02-03
Payer: MEDICARE

## 2025-02-03 VITALS
HEART RATE: 74 BPM | BODY MASS INDEX: 24.26 KG/M2 | DIASTOLIC BLOOD PRESSURE: 78 MMHG | SYSTOLIC BLOOD PRESSURE: 130 MMHG | TEMPERATURE: 97.7 F | OXYGEN SATURATION: 100 % | RESPIRATION RATE: 16 BRPM | HEIGHT: 69 IN | WEIGHT: 163.8 LBS

## 2025-02-03 DIAGNOSIS — F90.2 ATTENTION DEFICIT HYPERACTIVITY DISORDER (ADHD), COMBINED TYPE: ICD-10-CM

## 2025-02-03 DIAGNOSIS — N18.31 CHRONIC KIDNEY DISEASE, STAGE 3A (H): ICD-10-CM

## 2025-02-03 DIAGNOSIS — R03.0 ELEVATED BP WITHOUT DIAGNOSIS OF HYPERTENSION: ICD-10-CM

## 2025-02-03 DIAGNOSIS — R00.2 PALPITATIONS: Primary | ICD-10-CM

## 2025-02-03 LAB
ANION GAP SERPL CALCULATED.3IONS-SCNC: 12 MMOL/L (ref 7–15)
BUN SERPL-MCNC: 19 MG/DL (ref 8–23)
CALCIUM SERPL-MCNC: 9.4 MG/DL (ref 8.8–10.4)
CHLORIDE SERPL-SCNC: 103 MMOL/L (ref 98–107)
CREAT SERPL-MCNC: 1.11 MG/DL (ref 0.51–0.95)
EGFRCR SERPLBLD CKD-EPI 2021: 53 ML/MIN/1.73M2
ERYTHROCYTE [DISTWIDTH] IN BLOOD BY AUTOMATED COUNT: 12.6 % (ref 10–15)
GLUCOSE SERPL-MCNC: 91 MG/DL (ref 70–99)
HCO3 SERPL-SCNC: 24 MMOL/L (ref 22–29)
HCT VFR BLD AUTO: 37 % (ref 35–47)
HGB BLD-MCNC: 11.8 G/DL (ref 11.7–15.7)
MCH RBC QN AUTO: 29.4 PG (ref 26.5–33)
MCHC RBC AUTO-ENTMCNC: 31.9 G/DL (ref 31.5–36.5)
MCV RBC AUTO: 92 FL (ref 78–100)
PLATELET # BLD AUTO: 233 10E3/UL (ref 150–450)
POTASSIUM SERPL-SCNC: 4.8 MMOL/L (ref 3.4–5.3)
RBC # BLD AUTO: 4.01 10E6/UL (ref 3.8–5.2)
SODIUM SERPL-SCNC: 139 MMOL/L (ref 135–145)
TSH SERPL DL<=0.005 MIU/L-ACNC: 2.17 UIU/ML (ref 0.3–4.2)
WBC # BLD AUTO: 6.1 10E3/UL (ref 4–11)

## 2025-02-03 PROCEDURE — 80048 BASIC METABOLIC PNL TOTAL CA: CPT | Performed by: FAMILY MEDICINE

## 2025-02-03 PROCEDURE — 93000 ELECTROCARDIOGRAM COMPLETE: CPT | Performed by: FAMILY MEDICINE

## 2025-02-03 PROCEDURE — 85027 COMPLETE CBC AUTOMATED: CPT | Performed by: FAMILY MEDICINE

## 2025-02-03 PROCEDURE — 84443 ASSAY THYROID STIM HORMONE: CPT | Performed by: FAMILY MEDICINE

## 2025-02-03 PROCEDURE — 36415 COLL VENOUS BLD VENIPUNCTURE: CPT | Performed by: FAMILY MEDICINE

## 2025-02-03 PROCEDURE — 96127 BRIEF EMOTIONAL/BEHAV ASSMT: CPT | Performed by: FAMILY MEDICINE

## 2025-02-03 PROCEDURE — 99214 OFFICE O/P EST MOD 30 MIN: CPT | Performed by: FAMILY MEDICINE

## 2025-02-03 ASSESSMENT — PAIN SCALES - GENERAL: PAINLEVEL_OUTOF10: NO PAIN (0)

## 2025-02-03 NOTE — TELEPHONE ENCOUNTER
"Nurse Triage SBAR    Is this a 2nd Level Triage? NO    Situation: Patient experiencing \"skipped beats and fluttering in chest\" through the weekend.    Background: No cardiac history, HR 78 on apple watch    Assessment: See Triage    Protocol Recommended Disposition:   See in Office Today    Recommendation: PCP with no appts today, made appointment with available provider at clinic.     Routed to provider    Does the patient meet one of the following criteria for ADS visit consideration? 16+ years old, with an [facility] PCP     TIP  Providers, please consider if this condition is appropriate for management at one of our Acute and Diagnostic Services sites.     If patient is a good candidate, please use dotphrase <dot>triageresponse and select Refer to ADS to document.     Reason for Disposition   Age > 60 years  (Exception: Brief heartbeat symptoms that went away and now feels well.)    Additional Information   Negative: Passed out (e.g., fainted, lost consciousness, blacked out and was not responding)   Negative: Shock suspected (e.g., cold/pale/clammy skin, too weak to stand, low BP, rapid pulse)   Negative: Difficult to awaken or acting confused (e.g., disoriented, slurred speech)   Negative: Visible sweat on face or sweat dripping down face   Negative: Unable to walk, or can only walk with assistance (e.g., requires support)   Negative: Received SHOCK from implantable cardiac defibrillator and has persisting symptoms (i.e., palpitations, lightheadedness)   Negative: Feeling weak or lightheaded (e.g., woozy, feeling like they might faint) AND heart beating very rapidly (e.g., > 140 / minute)   Negative: Feeling weak or lightheaded (e.g., woozy, feeling like they might faint) AND heart beating very slowly (e.g., < 50 / minute)   Negative: Sounds like a life-threatening emergency to the triager   Negative: Chest pain   Negative: Difficulty breathing   Negative: Feeling weak or lightheaded (e.g., woozy, feeling like they " "might faint)   Negative: Heart beating very rapidly (e.g., > 140 / minute) and present now  (Exception: During exercise.)   Negative: Heart beating very slowly (e.g., < 50 / minute)  (Exception: Athlete and heart rate normal for caller.)   Negative: New or worsened shortness of breath with activity (dyspnea on exertion)   Negative: Patient sounds very sick or weak to the triager   Negative: Wearing a cardiac monitor (e.g., cardiac event, Holter)   Negative: Received SHOCK from implantable cardiac defibrillator (and now feels well)   Negative: Heart beating very rapidly (e.g., > 140 / minute) and not present now  (Exception: During exercise.)   Negative: Skipped or extra beat(s) and increases with exercise or exertion   Negative: Skipped or extra beat(s) and occurs 4 or more times per minute   Negative: History of heart disease (i.e., heart attack, bypass surgery, angina, angioplasty)  (Exception: Brief heartbeat symptoms that went away and now feels well.)    Answer Assessment - Initial Assessment Questions  1. DESCRIPTION: \"Please describe your heart rate or heartbeat that you are having\" (e.g., fast/slow, regular/irregular, skipped or extra beats, \"palpitations\")        \"Skipped beats\" \"Feels like a butterfly in my chest\"    2. ONSET: \"When did it start?\" (e.g., minutes, hours, days)         \"Over this weekend\"    3. DURATION: \"How long does it last\" (e.g., seconds, minutes, hours)        \"Seconds\"    4. PATTERN \"Does it come and go, or has it been constant since it started?\"  \"Does it get worse with exertion?\"   \"Are you feeling it now?\"        \"Comes and goes\" \"Not currently feeling it\"    5. TAP: \"Using your hand, can you tap out what you are feeling on a chair or table in front of you, so that I can hear?\" Note: Not all patients can do this.      N/A    6. HEART RATE: \"Can you tell me your heart rate?\" \"How many beats in 15 seconds?\"  Note: Not all patients can do this.          HR is 78 from apple watch    7. " "RECURRENT SYMPTOM: \"Have you ever had this before?\" If Yes, ask: \"When was the last time?\" and \"What happened that time?\"         No    8. CAUSE: \"What do you think is causing the palpitations?\"        Unsure    9. CARDIAC HISTORY: \"Do you have any history of heart disease?\" (e.g., heart attack, angina, bypass surgery, angioplasty, arrhythmia)        No    10. OTHER SYMPTOMS: \"Do you have any other symptoms?\" (e.g., dizziness, chest pain, sweating, difficulty breathing)          \"Maybe a slight dizziness if I get up quickly, I just don't feel right\"    11. PREGNANCY: \"Is there any chance you are pregnant?\" \"When was your last menstrual period?\"          No    Protocols used: Heart Rate and Heartbeat Ekaltcisr-E-SL    "

## 2025-02-03 NOTE — PROGRESS NOTES
Assessment & Plan     Palpitations  Plan: DDx include arrhythmia, anemia electrolytes or thyroid imbalance  vs nonspecific missed heart beat due to dehydration or caffeine.EKG- NSR  Advised to proceed with zio patch- and if that is normal as well- then follow up with primary care physician.  No beta blocker prescribed today and can be considered if sinus tachycardia   Meanwhile if associated shortness of breath- chest pain, need to be seen earlier as needed  or emergency department    - ZIO PATCH MAIL OUT; Future  - EKG 12-lead complete w/read - Clinics  - Basic metabolic panel  (Ca, Cl, CO2, Creat, Gluc, K, Na, BUN); Future    Labs   - CBC with platelets- no sign of anemia   - TSH with free T4 reflex-pending  - Basic metabolic panel  (Ca, Cl, CO2, Creat, Gluc, K, Na, BUN)-pending     Attention deficit hyperactivity disorder (ADHD), combined type  Plan: stimulants can cause palpitations and ideally should be stopped -till above subsides  She is unable to stop it for now    Chronic kidney disease, stage 3a (H)  Plan: ? Due to ? Hypertension  Unclear etiology  Follows nephrologist      Elevated BP without diagnosis of hypertension  Recheck Blood pressure is acceptable range         Ordering of each unique test  I spent a total of 30 minutes on the day of the visit.   Time spent by me today doing chart review, history and exam, documentation and further activities per the note        Subjective   Maggi is a 69 year old, presenting for the following health issues:  Irregular Heart Beat        2/3/2025     1:51 PM   Additional Questions   Roomed by Bria     History of Present Illness       Reason for visit:  Irregular heart beat  Symptom onset:  3-7 days ago  Symptoms include:  Irregular heart beat and fluttering in my chest  Symptom intensity:  Moderate  Symptom progression:  Staying the same  Had these symptoms before:  No  What makes it worse:  No  What makes it better:  No   She is taking medications  "regularly.     Heart feels  fluttering in  - upper center - lasts only a few  moments to a min - since just last week.  Noted lying down, resting, walking in house - only last for a moment- had it once while  snow shoveling- but none today.  Does not drink caffine, just herbal tea.  No associated shortness of breath, chest pain .    Always has high Blood pressure in medical office  Monitors at home- always normal    9 yrs older brother diagnosed with a fib-a number of years ago .  Known history of attention deficit hyperactivity disorder- on ritalin- same dose for > 2 decades.  Denies chest pain             Review of Systems  Constitutional, neuro, ENT, endocrine, pulmonary, cardiac, gastrointestinal, genitourinary, musculoskeletal, integument and psychiatric systems are negative, except as otherwise noted.      Objective    /78 (BP Location: Right arm, Patient Position: Sitting, Cuff Size: Adult Regular)   Pulse 74   Temp 97.7  F (36.5  C) (Skin)   Resp 16   Ht 1.74 m (5' 8.5\")   Wt 74.3 kg (163 lb 12.8 oz)   LMP 08/15/2010   SpO2 100%   Breastfeeding No   BMI 24.54 kg/m    Body mass index is 24.54 kg/m .  Physical Exam   GENERAL: alert and no distress  NECK: no adenopathy, no asymmetry, masses, or scars  RESP: lungs clear to auscultation - no rales, rhonchi or wheezes  CV: regular rate and rhythm, normal S1 S2, no S3 or S4, no murmur, click or rub, no peripheral edema  ABDOMEN: soft, nontender, no hepatosplenomegaly, no masses and bowel sounds normal  MS: no gross musculoskeletal defects noted, no edema    Results for orders placed or performed in visit on 02/03/25 (from the past 24 hours)   CBC with platelets   Result Value Ref Range    WBC Count 6.1 4.0 - 11.0 10e3/uL    RBC Count 4.01 3.80 - 5.20 10e6/uL    Hemoglobin 11.8 11.7 - 15.7 g/dL    Hematocrit 37.0 35.0 - 47.0 %    MCV 92 78 - 100 fL    MCH 29.4 26.5 - 33.0 pg    MCHC 31.9 31.5 - 36.5 g/dL    RDW 12.6 10.0 - 15.0 %    Platelet Count 233 " 150 - 465 10e3/uL           Signed Electronically by: Komal Friend MD

## 2025-03-31 ENCOUNTER — TELEPHONE (OUTPATIENT)
Dept: NEPHROLOGY | Facility: CLINIC | Age: 70
End: 2025-03-31
Payer: MEDICARE

## 2025-04-06 DIAGNOSIS — F33.0 MILD RECURRENT MAJOR DEPRESSION: ICD-10-CM

## 2025-04-17 ENCOUNTER — PATIENT OUTREACH (OUTPATIENT)
Dept: CARE COORDINATION | Facility: CLINIC | Age: 70
End: 2025-04-17
Payer: MEDICARE

## 2025-05-15 ENCOUNTER — PATIENT OUTREACH (OUTPATIENT)
Dept: CARE COORDINATION | Facility: CLINIC | Age: 70
End: 2025-05-15
Payer: MEDICARE

## 2025-05-20 ENCOUNTER — PATIENT OUTREACH (OUTPATIENT)
Dept: CARE COORDINATION | Facility: CLINIC | Age: 70
End: 2025-05-20
Payer: MEDICARE

## 2025-06-03 ENCOUNTER — PATIENT OUTREACH (OUTPATIENT)
Dept: CARE COORDINATION | Facility: CLINIC | Age: 70
End: 2025-06-03
Payer: MEDICARE

## 2025-07-03 DIAGNOSIS — F33.0 MILD RECURRENT MAJOR DEPRESSION: ICD-10-CM

## 2025-07-21 NOTE — PROGRESS NOTES
Preventive Care Visit  [facility]  Sara Thomas MD, Family Medicine  Jul 22, 2025      Assessment & Plan     Encounter for routine adult health examination without abnormal findings  Discussed diet,calcium,exercise.Went over self breast exam.Thin prep was NOT done.Eyes and teeth UTD.No immunizations needed today.See orders below for tests ordered and screening needed.    - Lipid panel reflex to direct LDL Non-fasting; Future  - REVIEW OF HEALTH MAINTENANCE PROTOCOL ORDERS  - PRIMARY CARE FOLLOW-UP SCHEDULING; Future  - Lipid panel reflex to direct LDL Non-fasting    Visit for screening mammogram  Order placed   - MA Screening Bilateral w/ Jack; Future     Chronic kidney disease, stage 3a (H)  She sees kidney specialist and has an upcoming appointment in August to discuss CKD   - Albumin Random Urine Quantitative with Creat Ratio; Future  - Albumin Random Urine Quantitative with Creat Ratio      Mild recurrent major depression  She is doing well on the prozac 60 mg daily dose , no side effects , refilled today   - FLUoxetine (PROZAC) 20 MG capsule; Take 3 capsules (60 mg) by mouth daily.  Patient has been advised of split billing requirements and indicates understanding: Yes    Counseling  Appropriate preventive services were addressed with this patient via screening, questionnaire, or discussion as appropriate for fall prevention, nutrition, physical activity, Tobacco-use cessation, social engagement, weight loss and cognition.  Checklist reviewing preventive services available has been given to the patient.  Reviewed patient's diet, addressing concerns and/or questions.   Reviewed preventive health counseling, as reflected in patient instructions       Regular exercise       Healthy diet/nutrition       Vision screening        Iain Tay is a 70 year old, presenting for the following:  Physical (AWV)        7/22/2025     6:54 AM   Additional Questions   Roomed by ANDRÉS Alcantar    Accompanied by N/A        Via the Health Maintenance questionnaire, the patient has reported the following services have been completed -Mammogram: [facility] 2024-05-20, this information has not been sent to the abstraction team.    [facility]    Wellness Visit Notes:     -Mammogram: Last done 5/2024 (impression: negative). Due today. Plan: Patient requesting to complete at [facility].  -DEXA: Last done 7/2024 (impression: osteopenia). Due 7/2027.   -PAP: Last done 2/2019 (impression: NIL). Provider to review PAP recommendations.   -Colon Cancer Screening: Last done via colonoscopy on 6/2016. (Impression: 1 polyp removed). Due 6/2026.   -Dermatology: Pt verbalized they do not meet with dermatology regularly.   -Immunizations: Patient is due/able to receive at clinic today: Covid - Patient declines.           CKD   Anxiety   Advance Care Planning    Patient states has Health Care Directive and will send to [employer].        7/22/2025   General Health   How would you rate your overall physical health? Good   Feel stress (tense, anxious, or unable to sleep) Only a little   (!) STRESS CONCERN      7/22/2025   Nutrition   Diet: Regular (no restrictions)         7/22/2025   Exercise   Days per week of moderate/strenous exercise 4 days   Average minutes spent exercising at this level 30 min         7/22/2025   Social Factors   Frequency of gathering with friends or relatives Three times a week   Worry food won't last until get money to buy more No   Food not last or not have enough money for food? No   Do you have housing? (Housing is defined as stable permanent housing and does not include staying outside in a car, in a tent, in an abandoned building, in an overnight shelter, or couch-surfing.) Yes   Are you worried about losing your housing? No   Lack of transportation? No   Unable to get utilities (heat,electricity)? No         7/22/2025   Fall Risk   Fallen 2 or more times in the past year? No    Trouble  with walking or balance? No        Proxy-reported          7/22/2025   Activities of Daily Living- Home Safety   Needs help with the following daily activites None of the above   Safety concerns in the home None of the above         7/22/2025   Dental   Dentist two times every year? Yes         7/22/2025   Hearing Screening   Hearing concerns? None of the above         7/22/2025   Driving Risk Screening   Patient/family members have concerns about driving No         7/22/2025   General Alertness/Fatigue Screening   Have you been more tired than usual lately? No         7/22/2025   Urinary Incontinence Screening   Bothered by leaking urine in past 6 months No       Today's PHQ-9 Score:       7/22/2025     6:44 AM   PHQ-9 SCORE   PHQ-9 Total Score MyChart 2 (Minimal depression)   PHQ-9 Total Score 2        Proxy-reported         7/22/2025   Substance Use   Alcohol more than 3/day or more than 7/wk No   Do you have a current opioid prescription? No   How severe/bad is pain from 1 to 10? 0/10 (No Pain)   Do you use any other substances recreationally? No     Social History     Tobacco Use    Smoking status: Never    Smokeless tobacco: Never   Substance Use Topics    Alcohol use: Yes     Alcohol/week: 0.0 standard drinks of alcohol     Comment: 1 glass wine per month    Drug use: No           5/17/2024   LAST FHS-7 RESULTS   1st degree relative breast or ovarian cancer No   Any relative bilateral breast cancer No   Any male have breast cancer No   Any ONE woman have BOTH breast AND ovarian cancer No   Any woman with breast cancer before 50yrs No   2 or more relatives with breast AND/OR ovarian cancer No   2 or more relatives with breast AND/OR bowel cancer No        Mammogram Screening - Mammogram every 1-2 years updated in Health Maintenance based on mutual decision making    ASCVD Risk   The ASCVD Risk score (Nathalia ROSADO, et al., 2019) failed to calculate for the following reasons:    The valid HDL cholesterol  range is 20 to 100 mg/dL            Reviewed and updated as needed this visit by Provider                    Past Medical History:   Diagnosis Date    Anxiety state, unspecified     Attention deficit disorder without mention of hyperactivity     Disorder of bone and cartilage, unspecified 4/03    Mild recurrent major depression 3/9/2009    Other diseases of lung, not elsewhere classified     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    Other specified viral warts     Ovarian cyst     Unspecified essential hypertension     Unspecified symptom associated with female genital organs     Urinary tract infection, site not specified      Past Surgical History:   Procedure Laterality Date    DILATION AND CURETTAGE  8/21/2013    Procedure: DILATION AND CURETTAGE;;  Surgeon: Tamar Walsh MD;  Location: [facility] OR    LAPAROSCOPIC SALPINGO-OOPHORECTOMY  8/21/2013    Procedure: LAPAROSCOPIC SALPINGO-OOPHORECTOMY;  Laparoscopic Bilateral Salpingo-Oophorectomy, Pelvic washings, Dilation and Curettage;  Surgeon: Tamar Walsh MD;  Location: [facility] OR    LUMPECTOMY BREAST Bilateral 5/31/2016    Procedure: LUMPECTOMY BREAST;  Surgeon: Barney Givens MD;  Location:  OR     Lab work is in process  Labs reviewed in EPIC  BP Readings from Last 3 Encounters:   07/22/25 134/81   02/03/25 130/78   08/14/24 (!) 148/81    Wt Readings from Last 3 Encounters:   07/22/25 74.5 kg (164 lb 3.2 oz)   02/03/25 74.3 kg (163 lb 12.8 oz)   08/14/24 75.1 kg (165 lb 8 oz)                  Patient Active Problem List   Diagnosis    Disorder of bone and cartilage    Disease of lung    Mild recurrent major depression    CARDIOVASCULAR SCREENING; LDL GOAL LESS THAN 160    Hypertension goal BP (blood pressure) < 140/90    Discharge from left nipple    Cervical lymphadenopathy    Elevated serum creatinine    Benign essential hypertension    Chronic kidney disease, stage 3a (H)     Past Surgical History:   Procedure Laterality Date    DILATION  AND CURETTAGE  8/21/2013    Procedure: DILATION AND CURETTAGE;;  Surgeon: Tamar Walsh MD;  Location: [facility] OR    LAPAROSCOPIC SALPINGO-OOPHORECTOMY  8/21/2013    Procedure: LAPAROSCOPIC SALPINGO-OOPHORECTOMY;  Laparoscopic Bilateral Salpingo-Oophorectomy, Pelvic washings, Dilation and Curettage;  Surgeon: Tamar Walsh MD;  Location: [facility] OR    LUMPECTOMY BREAST Bilateral 5/31/2016    Procedure: LUMPECTOMY BREAST;  Surgeon: Barney Givens MD;  Location:  OR       Social History     Tobacco Use    Smoking status: Never    Smokeless tobacco: Never   Substance Use Topics    Alcohol use: Yes     Comment: 1 glass wine per month     Family History   Problem Relation Age of Onset    Arthritis Mother     Osteoporosis Mother         at age 75    Heart Disease Mother         arhythmia    Allergies Mother         seasonal    Alzheimer Disease Mother         at age 81    C.A.D. Father         bypass at age 78 and Pace maker at age 90    Diabetes Father         type 2    Heart Disease Father     Gastrointestinal Disease Father         ulcers    Coronary Artery Disease Father     Breast Cancer Maternal Grandmother         at age 40's    Cerebrovascular Disease Maternal Grandfather         at age 70's    C.A.D. Paternal Grandmother         congestive heart failure    Respiratory Brother         bronchiestis    Coronary Artery Disease Brother          Current Outpatient Medications   Medication Sig Dispense Refill    FLUoxetine (PROZAC) 20 MG capsule Take 3 capsules (60 mg) by mouth daily. 270 capsule 4    albuterol (PROAIR HFA/PROVENTIL HFA/VENTOLIN HFA) 108 (90 Base) MCG/ACT inhaler Inhale 2 puffs into the lungs every 6 hours as needed for shortness of breath or wheezing 18 g 4    methylphenidate (RITALIN) 10 MG tablet Take 10 mg by mouth 2 times daily 1.5 in AM, 1.5 NOON, 20mg in PM if needed.       Allergies   Allergen Reactions    No Known Allergies      Recent Labs   Lab Test 02/03/25  1451 08/14/24  1157  06/19/24  0745 08/09/23  1228 05/31/23  0741 05/11/22  1224 03/30/22  0942 12/30/20  0809 01/10/20  0741 11/27/19  0835 09/10/19  1430 01/16/19  1422 10/31/17  1025   A1C  --   --  5.5  --   --   --   --  5.3  --   --   --   --  5.2   LDL  --   --  93  --  97  --  105* 94  --   --  124*  --  85   HDL  --   --  104  --  101  --  109 120  --   --  95  --  111   TRIG  --   --  53  --  65  --  59 64  --   --  57  --  56   ALT  --   --   --   --  26  --  26  --   --   --  25  --  47   CR 1.11* 1.18*  --    < > 1.06*   < > 1.14* 1.08* 1.28*   < > 1.09*   < > 1.25*   GFRESTIMATED 53* 49*  --    < > 57*   < > 53* 53* 44*   < > 53*   < > 43*   GFRESTBLACK  --   --   --   --   --   --   --  62 51*   < > 62   < > 52*   POTASSIUM 4.8 4.7  --    < > 4.4   < > 4.8 4.8 4.5   < > 4.4   < > 4.6   TSH 2.17  --  4.07  --  3.55   < >  --   --   --   --   --    < >  --     < > = values in this interval not displayed.           Current providers sharing in care for this patient include:  Patient Care Team:  Sara Thomas MD as PCP - General (Family Practice)  Sara Thomas MD as Assigned PCP  [facility]    The following health maintenance items are reviewed in Epic and correct as of today:  Health Maintenance   Topic Date Due    COVID-19 VACCINE (4 - 2024-25 season) 09/01/2024    MAMMO SCREENING  05/17/2025    LIPID  06/19/2025    ANNUAL REVIEW OF HM ORDERS  06/19/2025    MICROALBUMIN  08/14/2025    INFLUENZA VACCINE (1) 09/01/2025    PHQ-9  01/22/2026    BMP  02/03/2026    HEMOGLOBIN  02/03/2026    COLORECTAL CANCER SCREENING  07/01/2026    MEDICARE ANNUAL WELLNESS VISIT  07/22/2026    FALL RISK ASSESSMENT  07/22/2026    DEXA  07/02/2027    DTAP/TDAP/TD VACCINE (3 - Td or Tdap) 10/31/2027    DIABETES SCREENING  02/03/2028    ADVANCE CARE PLANNING  05/31/2028    RSV VACCINE (1 - 1-dose 75+ series) 04/20/2029    HEPATITIS C SCREENING  Completed    DEPRESSION ACTION PLAN  Completed     "PNEUMOCOCCAL VACCINE 50+ YEARS  Completed    URINALYSIS  Completed    ZOSTER VACCINE  Completed    HPV VACCINE  Aged Out    MENINGITIS VACCINE  Aged Out         Review of Systems  Constitutional, HEENT, cardiovascular, pulmonary, GI, , musculoskeletal, neuro, skin, endocrine and psych systems are negative, except as otherwise noted.     Objective    Exam  LMP 08/15/2010    Estimated body mass index is 24.54 kg/m  as calculated from the following:    Height as of 2/3/25: 1.74 m (5' 8.5\").    Weight as of 2/3/25: 74.3 kg (163 lb 12.8 oz).    Physical Exam  GENERAL: alert and no distress  EYES: Eyes grossly normal to inspection, PERRL and conjunctivae and sclerae normal  HENT: ear canals and TM's normal, nose and mouth without ulcers or lesions  NECK: no adenopathy, no asymmetry, masses, or scars  RESP: lungs clear to auscultation - no rales, rhonchi or wheezes  BREAST: normal without masses, tenderness or nipple discharge and no palpable axillary masses or adenopathy  CV: regular rate and rhythm, normal S1 S2, no S3 or S4, no murmur, click or rub, no peripheral edema  ABDOMEN: soft, nontender, no hepatosplenomegaly, no masses and bowel sounds normal  MS: no gross musculoskeletal defects noted, no edema  SKIN: no suspicious lesions or rashes  NEURO: Normal strength and tone, mentation intact and speech normal  PSYCH: mentation appears normal, affect normal/bright        6/19/2024   Mini Cog   Clock Draw Score 2 Normal              Signed Electronically by: Sara Thomas MD       Answers submitted by the patient for this visit:  Patient Health Questionnaire (Submitted on 7/22/2025)  If you checked off any problems, how difficult have these problems made it for you to do your work, take care of things at home, or get along with other people?: Not difficult at all  PHQ9 TOTAL SCORE: 2    "

## 2025-07-22 ENCOUNTER — OFFICE VISIT (OUTPATIENT)
Dept: FAMILY MEDICINE | Facility: CLINIC | Age: 70
End: 2025-07-22
Payer: MEDICARE

## 2025-07-22 VITALS
HEIGHT: 69 IN | RESPIRATION RATE: 16 BRPM | DIASTOLIC BLOOD PRESSURE: 81 MMHG | HEART RATE: 66 BPM | SYSTOLIC BLOOD PRESSURE: 134 MMHG | BODY MASS INDEX: 24.32 KG/M2 | OXYGEN SATURATION: 98 % | TEMPERATURE: 97.7 F | WEIGHT: 164.2 LBS

## 2025-07-22 DIAGNOSIS — F33.0 MILD RECURRENT MAJOR DEPRESSION: ICD-10-CM

## 2025-07-22 DIAGNOSIS — N18.31 CHRONIC KIDNEY DISEASE, STAGE 3A (H): ICD-10-CM

## 2025-07-22 DIAGNOSIS — Z12.31 VISIT FOR SCREENING MAMMOGRAM: ICD-10-CM

## 2025-07-22 DIAGNOSIS — Z00.00 ENCOUNTER FOR ROUTINE ADULT HEALTH EXAMINATION WITHOUT ABNORMAL FINDINGS: Primary | ICD-10-CM

## 2025-07-22 PROCEDURE — 36415 COLL VENOUS BLD VENIPUNCTURE: CPT | Performed by: FAMILY MEDICINE

## 2025-07-22 PROCEDURE — 99213 OFFICE O/P EST LOW 20 MIN: CPT | Mod: 25 | Performed by: FAMILY MEDICINE

## 2025-07-22 PROCEDURE — 3079F DIAST BP 80-89 MM HG: CPT | Performed by: FAMILY MEDICINE

## 2025-07-22 PROCEDURE — 3075F SYST BP GE 130 - 139MM HG: CPT | Performed by: FAMILY MEDICINE

## 2025-07-22 PROCEDURE — 80061 LIPID PANEL: CPT | Performed by: FAMILY MEDICINE

## 2025-07-22 PROCEDURE — 1126F AMNT PAIN NOTED NONE PRSNT: CPT | Performed by: FAMILY MEDICINE

## 2025-07-22 PROCEDURE — G0439 PPPS, SUBSEQ VISIT: HCPCS | Performed by: FAMILY MEDICINE

## 2025-07-22 SDOH — HEALTH STABILITY: PHYSICAL HEALTH: ON AVERAGE, HOW MANY MINUTES DO YOU ENGAGE IN EXERCISE AT THIS LEVEL?: 30 MIN

## 2025-07-22 SDOH — HEALTH STABILITY: PHYSICAL HEALTH: ON AVERAGE, HOW MANY DAYS PER WEEK DO YOU ENGAGE IN MODERATE TO STRENUOUS EXERCISE (LIKE A BRISK WALK)?: 4 DAYS

## 2025-07-22 ASSESSMENT — PATIENT HEALTH QUESTIONNAIRE - PHQ9
SUM OF ALL RESPONSES TO PHQ QUESTIONS 1-9: 2
10. IF YOU CHECKED OFF ANY PROBLEMS, HOW DIFFICULT HAVE THESE PROBLEMS MADE IT FOR YOU TO DO YOUR WORK, TAKE CARE OF THINGS AT HOME, OR GET ALONG WITH OTHER PEOPLE: NOT DIFFICULT AT ALL
SUM OF ALL RESPONSES TO PHQ QUESTIONS 1-9: 2

## 2025-07-22 ASSESSMENT — SOCIAL DETERMINANTS OF HEALTH (SDOH): HOW OFTEN DO YOU GET TOGETHER WITH FRIENDS OR RELATIVES?: THREE TIMES A WEEK

## 2025-07-22 ASSESSMENT — PAIN SCALES - GENERAL: PAINLEVEL_OUTOF10: NO PAIN (0)

## 2025-07-22 NOTE — PATIENT INSTRUCTIONS
Patient Education   Preventive Care Advice   This is general advice given by our system to help you stay healthy. However, your care team may have specific advice just for you. Please talk to your care team about your preventive care needs.  Nutrition  Eat 5 or more servings of fruits and vegetables each day.  Try wheat bread, brown rice and whole grain pasta (instead of white bread, rice, and pasta).  Get enough calcium and vitamin D. Check the label on foods and aim for 100% of the RDA (recommended daily allowance).  Lifestyle  Exercise at least 150 minutes each week  (30 minutes a day, 5 days a week).  Do muscle strengthening activities 2 days a week. These help control your weight and prevent disease.  No smoking.  Wear sunscreen to prevent skin cancer.  Have a dental exam and cleaning every 6 months.  Yearly exams  See your health care team every year to talk about:  Any changes in your health.  Any medicines your care team has prescribed.  Preventive care, family planning, and ways to prevent chronic diseases.  Shots (vaccines)   HPV shots (up to age 26), if you've never had them before.  Hepatitis B shots (up to age 59), if you've never had them before.  COVID-19 shot: Get this shot when it's due.  Flu shot: Get a flu shot every year.  Tetanus shot: Get a tetanus shot every 10 years.  Pneumococcal, hepatitis A, and RSV shots: Ask your care team if you need these based on your risk.  Shingles shot (for age 50 and up)  General health tests  Diabetes screening:  Starting at age 35, Get screened for diabetes at least every 3 years.  If you are younger than age 35, ask your care team if you should be screened for diabetes.  Cholesterol test: At age 39, start having a cholesterol test every 5 years, or more often if advised.  Bone density scan (DEXA): At age 50, ask your care team if you should have this scan for osteoporosis (brittle bones).  Hepatitis C: Get tested at least once in your life.  STIs (sexually  transmitted infections)  Before age 24: Ask your care team if you should be screened for STIs.  After age 24: Get screened for STIs if you're at risk. You are at risk for STIs (including HIV) if:  You are sexually active with more than one person.  You don't use condoms every time.  You or a partner was diagnosed with a sexually transmitted infection.  If you are at risk for HIV, ask about PrEP medicine to prevent HIV.  Get tested for HIV at least once in your life, whether you are at risk for HIV or not.  Cancer screening tests  Cervical cancer screening: If you have a cervix, begin getting regular cervical cancer screening tests starting at age 21.  Breast cancer scan (mammogram): If you've ever had breasts, begin having regular mammograms starting at age 40. This is a scan to check for breast cancer.  Colon cancer screening: It is important to start screening for colon cancer at age 45.  Have a colonoscopy test every 10 years (or more often if you're at risk) Or, ask your provider about stool tests like a FIT test every year or Cologuard test every 3 years.  To learn more about your testing options, visit:   .  For help making a decision, visit:   https://bit.ly/tu06300.  Prostate cancer screening test: If you have a prostate, ask your care team if a prostate cancer screening test (PSA) at age 55 is right for you.  Lung cancer screening: If you are a current or former smoker ages 50 to 80, ask your care team if ongoing lung cancer screenings are right for you.  For informational purposes only. Not to replace the advice of your health care provider. Copyright   2023 [facility]. All rights reserved. Clinically reviewed by the [facility]itions Program. [employer] [id number] - REV 01/24.  PLEASE CALL TO SCHEDULE YOUR MAMMOGRAM  [facility] [employer] [phone]  [facility] [phone]  [facility]   [phone]  Central Scheduling (all locations)  [phone]    If you are under/uninsured, we recommend you contact the [facility]. They offer mammograms on a sliding fee charge. You can schedule with them at [phone].

## 2025-07-23 LAB
CHOLEST SERPL-MCNC: 220 MG/DL
FASTING STATUS PATIENT QL REPORTED: YES
HDLC SERPL-MCNC: 95 MG/DL
LDLC SERPL CALC-MCNC: 110 MG/DL
NONHDLC SERPL-MCNC: 125 MG/DL
TRIGL SERPL-MCNC: 76 MG/DL

## 2025-07-26 ENCOUNTER — HEALTH MAINTENANCE LETTER (OUTPATIENT)
Age: 70
End: 2025-07-26